# Patient Record
Sex: MALE | Race: WHITE | NOT HISPANIC OR LATINO | Employment: UNEMPLOYED | URBAN - METROPOLITAN AREA
[De-identification: names, ages, dates, MRNs, and addresses within clinical notes are randomized per-mention and may not be internally consistent; named-entity substitution may affect disease eponyms.]

---

## 2017-08-16 ENCOUNTER — APPOINTMENT (OUTPATIENT)
Dept: RADIOLOGY | Facility: MEDICAL CENTER | Age: 42
End: 2017-08-16
Attending: EMERGENCY MEDICINE
Payer: COMMERCIAL

## 2017-08-16 ENCOUNTER — HOSPITAL ENCOUNTER (EMERGENCY)
Facility: MEDICAL CENTER | Age: 42
End: 2017-08-16
Attending: EMERGENCY MEDICINE
Payer: COMMERCIAL

## 2017-08-16 VITALS
TEMPERATURE: 99.5 F | WEIGHT: 205.25 LBS | BODY MASS INDEX: 29.38 KG/M2 | RESPIRATION RATE: 19 BRPM | OXYGEN SATURATION: 98 % | HEIGHT: 70 IN | HEART RATE: 94 BPM | DIASTOLIC BLOOD PRESSURE: 94 MMHG | SYSTOLIC BLOOD PRESSURE: 147 MMHG

## 2017-08-16 DIAGNOSIS — S00.03XA CONTUSION OF SCALP, INITIAL ENCOUNTER: ICD-10-CM

## 2017-08-16 DIAGNOSIS — M25.511 ACUTE PAIN OF BOTH SHOULDERS: ICD-10-CM

## 2017-08-16 DIAGNOSIS — M25.512 ACUTE PAIN OF BOTH SHOULDERS: ICD-10-CM

## 2017-08-16 DIAGNOSIS — T07.XXXA ABRASIONS OF MULTIPLE SITES: ICD-10-CM

## 2017-08-16 DIAGNOSIS — S09.90XA CHI (CLOSED HEAD INJURY), INITIAL ENCOUNTER: ICD-10-CM

## 2017-08-16 DIAGNOSIS — S06.0X9A CONCUSSION, WITH LOC OF UNSPECIFIED DURATION, INITIAL ENCOUNTER: ICD-10-CM

## 2017-08-16 LAB
ABO GROUP BLD: NORMAL
ALBUMIN SERPL BCP-MCNC: 5.1 G/DL (ref 3.2–4.9)
ALBUMIN/GLOB SERPL: 1.5 G/DL
ALP SERPL-CCNC: 66 U/L (ref 30–99)
ALT SERPL-CCNC: 82 U/L (ref 2–50)
ANION GAP SERPL CALC-SCNC: 14 MMOL/L (ref 0–11.9)
APTT PPP: 24.1 SEC (ref 24.7–36)
AST SERPL-CCNC: 56 U/L (ref 12–45)
BILIRUB SERPL-MCNC: 1.5 MG/DL (ref 0.1–1.5)
BLD GP AB SCN SERPL QL: NORMAL
BUN SERPL-MCNC: 19 MG/DL (ref 8–22)
CALCIUM SERPL-MCNC: 10.3 MG/DL (ref 8.5–10.5)
CHLORIDE SERPL-SCNC: 107 MMOL/L (ref 96–112)
CO2 SERPL-SCNC: 19 MMOL/L (ref 20–33)
CREAT SERPL-MCNC: 0.82 MG/DL (ref 0.5–1.4)
ERYTHROCYTE [DISTWIDTH] IN BLOOD BY AUTOMATED COUNT: 42.1 FL (ref 35.9–50)
ETHANOL BLD-MCNC: 0 G/DL
GFR SERPL CREATININE-BSD FRML MDRD: >60 ML/MIN/1.73 M 2
GLOBULIN SER CALC-MCNC: 3.3 G/DL (ref 1.9–3.5)
GLUCOSE SERPL-MCNC: 97 MG/DL (ref 65–99)
HCT VFR BLD AUTO: 51.4 % (ref 42–52)
HGB BLD-MCNC: 18.4 G/DL (ref 14–18)
INR PPP: 1.11 (ref 0.87–1.13)
MCH RBC QN AUTO: 33.9 PG (ref 27–33)
MCHC RBC AUTO-ENTMCNC: 35.8 G/DL (ref 33.7–35.3)
MCV RBC AUTO: 94.7 FL (ref 81.4–97.8)
PLATELET # BLD AUTO: 236 K/UL (ref 164–446)
PMV BLD AUTO: 9.3 FL (ref 9–12.9)
POTASSIUM SERPL-SCNC: 3.6 MMOL/L (ref 3.6–5.5)
PROT SERPL-MCNC: 8.4 G/DL (ref 6–8.2)
PROTHROMBIN TIME: 14.7 SEC (ref 12–14.6)
RBC # BLD AUTO: 5.43 M/UL (ref 4.7–6.1)
RH BLD: NORMAL
SODIUM SERPL-SCNC: 140 MMOL/L (ref 135–145)
WBC # BLD AUTO: 10 K/UL (ref 4.8–10.8)

## 2017-08-16 PROCEDURE — 85610 PROTHROMBIN TIME: CPT

## 2017-08-16 PROCEDURE — 96375 TX/PRO/DX INJ NEW DRUG ADDON: CPT

## 2017-08-16 PROCEDURE — 96374 THER/PROPH/DIAG INJ IV PUSH: CPT

## 2017-08-16 PROCEDURE — 72128 CT CHEST SPINE W/O DYE: CPT

## 2017-08-16 PROCEDURE — 73030 X-RAY EXAM OF SHOULDER: CPT | Mod: RT

## 2017-08-16 PROCEDURE — 700111 HCHG RX REV CODE 636 W/ 250 OVERRIDE (IP): Performed by: EMERGENCY MEDICINE

## 2017-08-16 PROCEDURE — 700105 HCHG RX REV CODE 258: Performed by: EMERGENCY MEDICINE

## 2017-08-16 PROCEDURE — 86850 RBC ANTIBODY SCREEN: CPT

## 2017-08-16 PROCEDURE — 71260 CT THORAX DX C+: CPT

## 2017-08-16 PROCEDURE — 73030 X-RAY EXAM OF SHOULDER: CPT | Mod: LT

## 2017-08-16 PROCEDURE — 72131 CT LUMBAR SPINE W/O DYE: CPT

## 2017-08-16 PROCEDURE — 80307 DRUG TEST PRSMV CHEM ANLYZR: CPT

## 2017-08-16 PROCEDURE — 80053 COMPREHEN METABOLIC PANEL: CPT

## 2017-08-16 PROCEDURE — 85027 COMPLETE CBC AUTOMATED: CPT

## 2017-08-16 PROCEDURE — 85730 THROMBOPLASTIN TIME PARTIAL: CPT

## 2017-08-16 PROCEDURE — 307740 HCHG GREEN TRAUMA TEAM SERVICES

## 2017-08-16 PROCEDURE — 86901 BLOOD TYPING SEROLOGIC RH(D): CPT

## 2017-08-16 PROCEDURE — 72125 CT NECK SPINE W/O DYE: CPT

## 2017-08-16 PROCEDURE — 700117 HCHG RX CONTRAST REV CODE 255: Performed by: EMERGENCY MEDICINE

## 2017-08-16 PROCEDURE — 86900 BLOOD TYPING SEROLOGIC ABO: CPT

## 2017-08-16 PROCEDURE — 99284 EMERGENCY DEPT VISIT MOD MDM: CPT

## 2017-08-16 PROCEDURE — 70450 CT HEAD/BRAIN W/O DYE: CPT

## 2017-08-16 RX ORDER — ONDANSETRON 2 MG/ML
INJECTION INTRAMUSCULAR; INTRAVENOUS
Status: COMPLETED | OUTPATIENT
Start: 2017-08-16 | End: 2017-08-16

## 2017-08-16 RX ORDER — MORPHINE SULFATE 4 MG/ML
INJECTION, SOLUTION INTRAMUSCULAR; INTRAVENOUS
Status: COMPLETED | OUTPATIENT
Start: 2017-08-16 | End: 2017-08-16

## 2017-08-16 RX ORDER — SODIUM CHLORIDE 9 MG/ML
1000 INJECTION, SOLUTION INTRAVENOUS ONCE
Status: COMPLETED | OUTPATIENT
Start: 2017-08-16 | End: 2017-08-16

## 2017-08-16 RX ADMIN — IOHEXOL 100 ML: 350 INJECTION, SOLUTION INTRAVENOUS at 18:04

## 2017-08-16 RX ADMIN — MORPHINE SULFATE 4 MG: 4 INJECTION INTRAVENOUS at 17:47

## 2017-08-16 RX ADMIN — ONDANSETRON 4 MG: 2 INJECTION INTRAMUSCULAR; INTRAVENOUS at 17:48

## 2017-08-16 RX ADMIN — SODIUM CHLORIDE 1000 ML: 9 INJECTION, SOLUTION INTRAVENOUS at 19:13

## 2017-08-16 RX ADMIN — SODIUM CHLORIDE 1000 ML: 9 INJECTION, SOLUTION INTRAVENOUS at 20:58

## 2017-08-16 RX ADMIN — FENTANYL CITRATE 25 MCG: 50 INJECTION, SOLUTION INTRAMUSCULAR; INTRAVENOUS at 19:13

## 2017-08-16 NOTE — ED NOTES
Pt stated he was hiking last night, woke up this morning and crawled out of a ditch. Right arm pain, and loss of consciousness. Unsure of events leading up to LOC. Ambulatory, with no obvious injuries.

## 2017-08-16 NOTE — ED AVS SNAPSHOT
Hands Access Code: [email or web address]  Expires: 9/15/2017  9:32 PM    Hands  A secure, online tool to manage your health information     "[employer]’s Hands® is a secure, online tool that connects you to your personalized health information from the privacy of your home -- day or night - making it very easy for you to manage your healthcare. Once the activation process is completed, you can even access your medical information using the Hands chucho, which is available for free in the [employer] store or Google Play store.     Hands provides the following levels of access (as shown below):   My Chart Features   [facility] Primary Care Doctor [facility]  Specialists [facility]  Urgent  Care Non-[facility]  Primary Care  Doctor   Email your healthcare team securely and privately 24/7 X X X X   Manage appointments: schedule your next appointment; view details of past/upcoming appointments X      Request prescription refills. X      View recent personal medical records, including lab and immunizations X X X X   View health record, including health history, allergies, medications X X X X   Read reports about your outpatient visits, procedures, consult and ER notes X X X X   See your discharge summary, which is a recap of your hospital and/or ER visit that includes your diagnosis, lab results, and care plan. X X       How to register for Hands:  1. Go to  [email or web address].  2. Click on the Sign Up Now box, which takes you to the New Member Sign Up page. You will need to provide the following information:  a. Enter your Hands Access Code exactly as it appears at the top of this page. (You will not need to use this code after you’ve completed the sign-up process. If you do not sign up before the expiration date, you must request a new code.)   b. Enter your date of birth.   c. Enter your home email address.   d. Click Submit, and follow the next screen’s instructions.  3. Create a Hands ID. This will be your Hands  login ID and cannot be changed, so think of one that is secure and easy to remember.  4. Create a [employer] password. You can change your password at any time.  5. Enter your Password Reset Question and Answer. This can be used at a later time if you forget your password.   6. Enter your e-mail address. This allows you to receive e-mail notifications when new information is available in [employer].  7. Click Sign Up. You can now view your health information.    For assistance activating your [employer] account, call [phone]

## 2017-08-16 NOTE — ED AVS SNAPSHOT
Home Care Instructions                                                                                                                Jarod Fields   MRN: [id number]    Department:  [facility], Emergency Dept   Date of Visit:  8/16/2017            [facility], Emergency Dept    [address]    [place] [address]    Phone:  [phone]      You were seen by     Len Johnson M.D.      Your Diagnosis Was     [facility] (closed head injury), initial encounter     S09.90XA       These are the medications you received during your hospitalization from 08/16/2017 1641 to 08/16/2017 2132     Date/Time Order Dose Route Action    08/16/2017 1747 morphine (pf) 4 mg/ml injection 4 mg Intravenous Given    08/16/2017 1748 ondansetron (ZOFRAN) syringe/vial injection 4 mg Intravenous Given    08/16/2017 1804 iohexol (OMNIPAQUE) 350 mg/mL 100 mL Intravenous Given    08/16/2017 1913 NS infusion 1,000 mL 1,000 mL Intravenous New Bag    08/16/2017 1913 fentaNYL (SUBLIMAZE) injection 25 mcg 25 mcg Intravenous Given    08/16/2017 2058 NS infusion 1,000 mL 1,000 mL Intravenous New Bag      Follow-up Information     1. Follow up with Pcp Pt States None. Schedule an appointment as soon as possible for a visit in 1 day.    Specialty:  Family Medicine        2. Follow up with pcp. Schedule an appointment as soon as possible for a visit in 1 day.    Contact information    See her primary care physician tomorrow      Medication Information     Review all of your home medications and newly ordered medications with your primary doctor and/or pharmacist as soon as possible. Follow medication instructions as directed by your doctor and/or pharmacist.     Please keep your complete medication list with you and share with your physician. Update the information when medications are discontinued, doses are changed, or new medications (including over-the-counter products) are added; and carry medication  information at all times in the event of emergency situations.               Medication List      ASK your doctor about these medications        Instructions    Morning Afternoon Evening Bedtime    gabapentin 300 MG Caps   Commonly known as:  NEURONTIN        Take 300 mg by mouth 2 Times a Day.   Dose:  300 mg                        oxyCODONE CR 20 MG T12a   Commonly known as:  OXYCONTIN        Take 20 mg by mouth every 12 hours.   Dose:  20 mg                        * oxycodone-acetaminophen  MG Tabs   Commonly known as:  PERCOCET-10        Take 1 Tab by mouth every 6 hours as needed for Severe Pain.   Dose:  1 Tab                        * oxycodone-acetaminophen  MG Tabs   Commonly known as:  PERCOCET-10        Take 1-2 Tabs by mouth every four hours as needed for Severe Pain.   Dose:  1-2 Tab                        * LYRICA 300 MG capsule   Generic drug:  pregabalin        Take 300 mg by mouth 2 times a day.   Dose:  300 mg                        * pregabalin 150 MG Caps   Commonly known as:  LYRICA        Take 300 mg by mouth 2 times a day.   Dose:  300 mg                        tizanidine 2 MG tablet   Commonly known as:  ZANAFLEX        Take 2 mg by mouth 3 times a day as needed. Indications: Muscle Spasticity   Dose:  2 mg                        * Notice:  This list has 4 medication(s) that are the same as other medications prescribed for you. Read the directions carefully, and ask your doctor or other care provider to review them with you.            Procedures and tests performed during your visit     APTT    CBC WITHOUT DIFFERENTIAL    COD (ADULT)    COMP METABOLIC PANEL    COMPONENT CELLULAR    CT-CHEST,ABDOMEN,PELVIS WITH    CT-CSPINE WITHOUT PLUS RECONS    CT-HEAD W/O    CT-LSPINE W/O PLUS RECONS    CT-TSPINE W/O PLUS RECONS    DIAGNOSTIC ALCOHOL    DX-SHOULDER 2+ LEFT    DX-SHOULDER 2+ RIGHT    ESTIMATED GFR    PROTHROMBIN TIME        Discharge Instructions       Blunt Trauma  You have been  "evaluated for injuries. You have been examined and your caregiver has not found injuries serious enough to require hospitalization.  It is common to have multiple bruises and sore muscles following an accident. These tend to feel worse for the first 24 hours. You will feel more stiffness and soreness over the next several hours and worse when you wake up the first morning after your accident. After this point, you should begin to improve with each passing day. The amount of improvement depends on the amount of damage done in the accident.  Following your accident, if some part of your body does not work as it should, or if the pain in any area continues to increase, you should return to the Emergency Department for re-evaluation.   HOME CARE INSTRUCTIONS   Routine care for sore areas should include:  · Ice to sore areas every 2 hours for 20 minutes while awake for the next 2 days.  · Drink extra fluids (not alcohol).  · Take a hot or warm shower or bath once or twice a day to increase blood flow to sore muscles. This will help you \"limber up\".  · Activity as tolerated. Lifting may aggravate neck or back pain.  · Only take over-the-counter or prescription medicines for pain, discomfort, or fever as directed by your caregiver. Do not use aspirin. This may increase bruising or increase bleeding if there are small areas where this is happening.  SEEK IMMEDIATE MEDICAL CARE IF:  · Numbness, tingling, weakness, or problem with the use of your arms or legs.  · A severe headache is not relieved with medications.  · There is a change in bowel or bladder control.  · Increasing pain in any areas of the body.  · Short of breath or dizzy.  · Nauseated, vomiting, or sweating.  · Increasing belly (abdominal) discomfort.  · Blood in urine, stool, or vomiting blood.  · Pain in either shoulder in an area where a shoulder strap would be.  · Feelings of lightheadedness or if you have a fainting episode.  Sometimes it is not possible to " identify all injuries immediately after the trauma. It is important that you continue to monitor your condition after the emergency department visit. If you feel you are not improving, or improving more slowly than should be expected, call your physician. If you feel your symptoms (problems) are worsening, return to the Emergency Department immediately.  Document Released: 09/13/2002 Document Revised: 03/11/2013 Document Reviewed: 08/05/2009  ExitCare® Patient Information ©2014 [employer].    Acute Brain Injury, Guide for Family  This is basic information about brain injury and its treatment. Please read it at your own pace. As you learn more about the brain, you will have many questions. Members of the health care team will do their best to answer your questions. Definite answers may not be known because the long term effects of brain injury can be difficult to predict.  HOW DOES THE BRAIN WORK?  The brain controls the actions of the body and allows us to think, learn, and remember. The brain has three main parts which are the:  · Cerebral hemispheres, left and right which are divided into lobes.   · Cerebellum.   · Brain stem.   Each section of the brain has special jobs to do and sections of the brain also work together. The left cerebral hemisphere controls the right side of the body and is usually responsible for speech. The right cerebral hemisphere controls the left side of the body and is usually responsible for creative thinking.   PROTECTION AND OXYGEN FOR THE BRAIN  The brain controls many important functions. It needs good protection and blood supply. This is done in the following ways:  · Skull: A hard bone that surrounds the brain tissue.   · Dura: A tough covering around the brain tissue and the spinal cord.   · Cerebrospinal Fluid (CSF) or Spinal Fluid: Fluid that flows through the ventricles and around the brain and spinal cord. The ventricles are spaces inside the brain. The cerebrospinal fluid  "acts like a \"shock absorber\" for the brain.   · Blood: Provides oxygen and food for the brain.   WHAT TYPES OF BRAIN INJURIES MAY OCCUR?  Even though the brain is well protected, it may be injured. Every injury is different. Most injuries are a result of bruising, bleeding, twisting, or tearing of brain tissue. Damage to the brain may occur at the time of injury. It may also develop after the injury due to swelling or further bleeding. Some types of brain injury are:  · Skull Fracture: a break in the bone that surrounds the brain. These fractures often heal on their own. Surgery may be needed if there has been damage to the brain tissue below the fracture.   · Contusion/Concussion: a mild injury or bruise to the brain which may cause a short loss of consciousness. It may cause headaches, nausea, vomiting, dizziness, and problems with memory and concentration. This injury will not need surgery.   · Coup/Contre-Coup: A English word that describes contusions that occur at two sites in the brain. When the head is hit, the impact causes the brain to bump the opposite side of the skull. Damage occurs at the site of impact and on the opposite side of the brain.   · Epidural Hematoma: a blood clot that forms between the skull and the top lining of the brain (dura). This blood clot can cause fast changes in the pressure inside the brain. Emergency surgery may be needed. The size of the clot will determine if surgery is needed.   · Subdural Hematoma: a blood clot that forms between the dura and the brain tissue. If this bleeding occurs quickly it is called an acute subdural hematoma. If it occurs slowly over several weeks, it is called a chronic subdural hematoma. The clot may cause increased pressure and may need to be removed surgically.   · Intracerebral Hemorrhage: A blood clot deep in the middle of the brain that is hard to remove. Pressure from this clot may cause damage to the brain. Surgery may be needed to relieve the " pressure.   · Diffuse Axonal Injury (FAUSTO): Damage to the pathways (axons) that connect the different areas of the brain. This occurs when there is twisting and turning of the brain tissue at the time of injury. The brain messages are slowed or lost. Treatment is aimed at managing swelling in the brain because torn axons can not be repaired.   · Anoxic Brain Injury: An injury that results from a lack of oxygen to the brain. This is most often from a lack of blood flow due to injury or bleeding.   WHAT HAPPENS WHEN THE BRAIN IS INJURED?  Damage to the brain may occur immediately, as a result of the injury, or it may develop as a result of swelling or bleeding that follows the injury.  BRAIN WITH EDEMA:  Changes can result from brain injury. The brain tissue may swell causing it to take up more room in the skull. This is called edema. When this occurs, the swollen brain tissue will push the other contents of the skull to the side.  BRAIN WITH A HEMATOMA:  There may be bruising called contusions or a collection of blood called a hematoma or clot. This may also push the other contents to one side.  BRAIN WITH HYDROCEPHALUS:  The flow of CSF may also become blocked. This will cause the open spaces (ventricles) to become enlarged. This is called hydrocephalus.  Any of these changes can cause increased intracranial pressure.  WHAT IS INTRACRANIAL PRESSURE?  To understand intracranial pressure, think of the skull as a rigid box. After brain injury, the skull may become overfilled with swollen brain tissue, blood, or CSF. The skull will not stretch like skin to deal with swelling. The skull may become too full and increase the pressure on the brain tissue. This is called increased intracranial pressure.   WHAT IS CEREBRAL PERFUSION PRESSURE?  Blood flow to the brain is called cerebral perfusion pressure. Blood pressure and intracranial pressure affect the cerebral perfusion pressure. If the blood pressure is low and/or the  intracranial pressure is high, the blood flow to the brain may be limited. This causes decreased cerebral perfusion pressure.   HOW WILL THE PATIENT RESPOND TO A BRAIN INJURY?  The patient's responses may vary depending on the type of injury or pressure changes in the brain. Possible responses include:   · Agitation.   · Confusion.   · Patients may also have problems with speech, vision, or muscle weakness in their arms or legs.   · Decreased responses.   · Coma. A state of unresponsiveness when patients do not speak or follow commands, and are unaware of their surroundings. The length of time a patient remains in a coma varies.   HOW ARE BRAIN INJURIES EVALUATED?  Patients with brain injury require frequent assessments and diagnostic tests. These include:   · Neurological Exam: A series of questions and simple commands to see if the patient can open their eyes, move, speak, and understand what is going on around them. For example: What is your name? Where are you? What day is it? Wiggle your toes. Hold up two fingers. A standard way to describe patient responses may be used. Most hospitals use the [place] Coma Scale or [place] Levels of Cognitive Functioning. You can read about these scales and what the scores mean in the Appendix.   · X-ray: A picture that looks at bones to see if they are broken (fractured). It can also be used to take a picture of the chest to look at the lungs. This test may be done at the bedside or in the X-ray department and takes between 5-30 minutes to complete.   · CT Scan (CAT Scan): An X-ray that takes pictures of the brain or other parts of the body. The scan is painless but the patient must lie very still. The test takes 15-30 minutes to complete.   · MRI (Magnetic Resonance Imaging) Scan: A large magnet and radio waves are used, instead of X-rays, to take pictures of the body's tissues. It is painless but noisy. The machine is shaped like a long tube. The patient must lie still on a  flat table in the middle of the machine. The test takes about 60 minutes to complete.   · Angiogram: A test to look at the blood vessels in the brain. Using a catheter, or small flexible tube, dye is put into an artery (usually in the groin) that supplies blood to the brain. This test can tell if the blood vessels have been damaged or are spasming. The test takes 1-3 hours.   · ICP Monitor: A small tube placed into or just on top of the brain through a small hole in the skull. This will measure the pressure inside the brain (intracranial pressure).   · EEG (Electroencephalograph): A test to measure electrical activity in the brain. Special patches called electrodes are applied to the head to measure the activity. The test is painless and can be done at the bedside or in the EEG department. The length of the test varies.   HOW ARE BRAIN INJURIES TREATED?  The goals of brain injury treatment are to:   · Stop any bleeding.   · Prevent an increase in pressure within the skull.   · Control the amount of pressure, when it does increase.   · Maintain adequate blood flow to the brain.   · Remove any large blood clots.   TREATMENT   Treatments will vary with the type of injury. Your caregiver will decide which ones are used. Some common treatments are:  · Positioning: Usually the head of the bed will be elevated slightly and the neck kept straight. This position may decrease the intracranial pressure by allowing blood and CSF to drain from the brain. Please do not change the position of the bed without asking the nurse.   · Fluid Restriction: It may be necessary to limit the fluids that a patient receives. The brain is like a sponge. It swells with extra fluid. Limiting fluids can help control the swelling. Please do not give fluids without asking the nurse.   · Ventricular Drain (Ventriculostomy): A small tube is placed in the ventricle. It measures and controls pressure inside the skull. It can be used to drain some CSF  "(cerebrospinal fluid) from the brain.   · Ventilator: A machine used to support the patient in their own breathing, or give the patient breaths. When the ventilator gives extra breaths, the blood vessels in the brain become smaller. This may help control the intracranial pressure.   · Surgery: There are three types of surgery used with brain injury:   · Craniotomy - The skull is opened to relieve the causes of increased pressure inside the skull. Causes may be fractured bones, blood clots, or swollen brain tissue.   · Pankaj holes - A small opening is made into the skull to remove blood clots.   · Bone flap removal - A piece of bone is removed from the skull to relieve pressure caused by swollen brain tissue.   MEDICATION  There are several types of medications used to treat brain injury. Some of these include:  · Diuretics are used to decrease the amount of water in the patient's body. This makes less water available to the brain for swelling.   · Anticonvulsants are used to prevent seizures. Seizures occur as a result of extra electrical activity in the brain. There are several types of seizures. The most common type causes the patient to have jerking movements of the arms and legs followed by sleep. Other types may cause slight tremors of the face, or staring spells. Please notify the nurse or doctor if you see any of these signs. Some patients have a seizure at the time of injury while others may develop seizures after the injury.   · Sedatives are given if the patient's intracranial pressure is very high and hard to control. This medicine puts the patient into a deep \"sleep\". This may help prevent more swelling and damage.   WHAT EQUIPMENT WILL YOU SEE WHEN YOU VISIT?  Depending on the type of brain injury, different kinds of equipment will be used. Ask a member of the health care team if you have any questions about equipment.   · Monitor: A machine that shows heart rate, breathing, blood pressure, intracranial " "pressure, and cerebral perfusion pressure.   · Head Dressing: A bandage around the head used to keep the wound or incision clean and dry.   · ICP Monitor: A small tube placed into or just on top of the brain through a small hole in the skull. This will measure the amount of pressure inside the brain (intracranial pressure).   · Nasogastric Tube (NG): A tube placed through the nose into the stomach that can be used to suction the stomach or provide liquid formula directly into the stomach.   · Endotracheal Tube: A tube inserted through the patient's nose or mouth into the trachea (windpipe) to help with breathing and suctioning.   · EKG Lead Wires: Wires connected to the chest with small patches that measure the heart rate and rhythm.   · Intravenous Catheter (IV) and Intravenous Fluid: A flexible catheter which allows fluid, nutrients, and medicine to be given directly into a vein.   · Ventilator: A machine used in the Intensive Care Unit to support the patient in their own breathing or give the patient breaths.   · Anti-Embolism Stockings (Frequently call TEDS): Long white stockings used to help prevent the pooling of blood in the legs.   · Sequential Compression Stockings (Frequently called Ulices's): Plastic leg wraps that help prevent blood clots by inflating and deflating around the legs.   · Urinary \"Rose\" Catheter: A tube inserted into the bladder to drain and allow for accurate measurement of urine.   WHAT OTHER TREATMENTS MAY BE USED?  · Antibiotics: Antibiotics are used to prevent and treat infections that occur. It is not unusual for people with brain injuries to get infections. They may get pneumonia, bladder infections, blood infections, or infections in the brain or cerebrospinal fluid called meningitis.   · Chest PT and Suctioning: To prevent or treat pneumonia, staff may use a vibrating machine or may clap on the patient's chest. This loosens the phlegm in the lungs. Then the patient will be asked " to cough. If the patient is not able to cough up the phlegm they must be suctioned. When a patient is suctioned a catheter is placed in the back of the throat or into the lungs.   · Tracheostomy (Trach): If the patient has a lot of lung secretions or is on a ventilator for a long time they may need a trach. A trach is a tube placed in the trachea (windpipe). It will make it easier for the patient to cough up phlegm. It also allows the nurse to suction the lungs. Initially the patient will be unable to talk while the trach is in place. As the patient improves, a talking trach may be used. A trach is usually not permanent.   · Suctioning of the Stomach: Sometimes after brain injury, the stomach will stop working for a short time. This is called an ileus. Even though the stomach may not be working it continues to make acid. The acid may damage the stomach lining and cause stomach ulcers if they are not removed. A nasogastric tube (NG) will be placed through the nose into the stomach. This tube will be used to help remove stomach secretions. Medications may also be given to help prevent stomach ulcers.   · Nutrition: Meeting nutrition and fluid needs is important after brain injury. Patients may be less active, yet have very high nutritional needs. At first, nutrition may be supplied by an IV. When the stomach starts working, an evaluation of chewing and swallowing safety will be completed. If the patient is too sleepy to eat, or unable to swallow, a small nasogastric feeding tube may be used for nutrition. The tube is placed through the nose into the stomach. Liquid formula will be given through the feeding tube. Feedings may be given continuously or several times a day. The dietician will assist with food and fluid selection. Milkshakes and liquid formulas may also be used to provide extra calories and high protein nutrition. A feeding tube may be used if the patient continues to be too sleepy to eat or unable to  swallow. A gastrostomy tube is a feeding tube that goes in the stomach. A jejunostomy tube is a feeding tube that goes in the intestine.   · Bowel and Bladder Care: Patients may not have control of their bowel or bladder. Catheters or diapers will be used until bowel and bladder control returns.   · Skin Care: Some things that help prevent bedsores include turning the patient, padding equipment, keeping skin clean and dry, using special mattresses, and making sure the patient gets enough calories.   · Range of Motion (ROM) and Splints: Patients with brain injury may not be able to move their joints as much as needed. This can cause tight muscles and joints called contractures. Range of motion (ROM) exercises and special splints for hands and feet help prevent contractures.   · Pain Control: Comfort measures and medication will be used for pain control. However, medications may be limited to types that do not cause drowsiness.   WHO WILL HELP AFTER BRAIN INJURY?  Members of the health care team will work together with the patient, family, and friends during the hospital stay. Care will be centered on the individual needs of the patient. Family and friends are important members of the team.   · Patient: The patient is the most important member of the team. Care will be planned based on how the patient responds to treatment.   · Family and Friends: You provide emotional support to the patient. Family and friends also provide the health care team with important facts about the patient's past history and can help watch for changes. Other team members will show you what you can do to help with the recovery process.   · Doctors: Neurosurgery doctors are specialists who help determine the type of brain injury and its treatment. They may perform surgery on the brain. They will work with other doctors if the patient is in intensive care or has injuries to other parts of the body.   · Nurses: Nurses check patient's vitals  (temperature, blood pressure, heart and breathing rate) and watch for changes in strength and thinking. They help with daily cares such as eating and bathing. Nurses also coordinate care among the members of the health care team.   · Social Workers: Social workers provide emotional support to help the patient and family adjust to being in the hospital. They coordinate discharge planning, referral to community resources, and answer questions about insurance or disability.   · Physical Therapists (PT): Physical therapists evaluate and treat weaknesses in the patient's strength, flexibility, balance, rolling, sitting, standing and walking. Treatment may include exercises or instruction in use of equipment such as walkers, canes, or wheelchairs.   · Occupational Therapists (OT): Occupational therapists evaluate the patient's ability to perform dressing, bathing, homemaking and activities that require memory and organization. They provide treatment or equipment needed for safe independent living.   · Speech Therapists: Speech therapists test and treat speech, language, thinking and swallowing problems.   · Neuropsychologists: Neuropsychologists test thinking, memory, judgment, emotions, behavior and personality. This information can be used to help guide treatment. It will also help determine the amount of supervision that the patient needs when they leave the hospital.   · Dieticians: Dieticians assess nutritional needs. They work with the patient and other team members to help the patient meet their nutritional goals.   · Other staff members may work with the patient and family. These include:   · Respiratory therapists.   · Activity Therapists.   · Clergy.   · Child Life Therapists.   · Patient Representatives.   · Vocational Counselors.   · Music Therapists.   · Recreation therapists.   HOW WILL YOU REACT?  When a friend or family member is hospitalized with a brain injury, it is normal for you to have many different  emotions. These emotions will come and go at different times.   · Panic and Fear: Panic and fear are common reactions after a family member has a brain injury. Fears are intense because you are worried the patient may not survive. Until the patient becomes medically stable, your physical and emotional signs of panic may continue. These symptoms include rapid breathing, inability to sleep, decreased appetite and upset stomach. Some people may cry uncontrollably.   · Shock and Denial: You may feel that what is happening is not real. You may notice things going on around you, but have trouble remembering information and conversations or meetings with others. You may also have a hard time understanding the seriousness of the injury that has occurred.   · Anger: Many people feel angry that they or their loved ones are in this situation. This may be justified. You may be angry with the patient for putting themselves in a situation where they could be hurt. You may also be angry with family members, friends, or others involved in the accident. You may be upset with the health care team for not doing or saying what you think is right. These are all normal reactions.   · Guilt: Guilt is also a normal reaction. You may feel you could have done something to prevent the accident from happening, even when this may be far from true. You may also think about past events and personal experiences with the patient that you wish could have been different or better. If you are feeling angry with the patient, you may also feel guilty about your anger. We encourage you to talk about your feelings with someone close to you or a professional staff member.   · Isolation: During this time you may feel distant from others. In this strange situation, you may have a hard time relating to others. You may think that others will not understand. You may isolate yourself because you think others are scared or disapprove of your feelings. However, a  "crisis such as a brain injury is a time when it is helpful to accept comfort, support and assistance from others.   · Hope: As the patient begins to stabilize, your anxiety about survival will be combined with hope of recovery. Medical complications and slow recovery may increase anxiety. However, hope may be brought about by the smallest changes.   Any of these emotions are normal reactions to a very stressful situation. You may find it helpful to discuss your feelings with friends, family, or the health care team. It may also be useful to write about your feelings and experiences in a daily journal.   WHAT ARE SUGGESTIONS FOR COPING?  People cope with stressful situations in different ways. What works for one person may not be helpful to another. We hope some of these suggestions will help you get through this difficult time.   · Write important information down in a journal or notebook. You can use this to keep track of questions you want to ask members of the health care team. It may also be useful to share with the patient.   · Establish a \"phone tree.\" Name one person for family and friends to call for information on the patient's condition.   · Rotate family visits. If you need or want to leave the hospital, you may want to have someone stay with the patient so you can feel reassured the patient is not alone.   · When someone offers to help, accept the offer. Try to be specific about how this person can help.   · Express your feelings. Discuss your positive and negative feelings with family members, friends, and staff.   · Be kind to yourself. Take time for a walk or have a meal with a friend. Also, try to leave the hospital for a meal or restful night of sleep. It is very important to take care of yourself. By taking care of your own needs, you will be more prepared to make good decisions and support your loved one.   WHAT ARE THE SIGNS OF IMPROVEMENT?  \"How long will it take for my loved one to get better?\" " "\"What will he or she be like?\" Your health care team may have a hard time answering these questions. Age, extent of damage, length of time since injury, and past mental and physical health of the patient are factors used when predicting the extent of recovery.   Most patients follow a general pattern of recovery after a severe brain injury. This pattern is divided into stages. It is important to know each patient is different and may not follow the stages exactly. Patients vary in the amount of time spent at each stage and their recovery may stop at any stage. Recovery may be grouped into the following four stages:  Stage 1: Unresponsiveness: During this stage the patient does not respond consistently or appropriately. You may hear this stage referred to as a coma. You may notice different movements in the patient. These are referred to as reflexive or generalized responses.  · Decerebrate: A reflex that causes straightening of the arms and legs.   · Decorticate: A reflex that causes bending of the arms and straightening of the legs.   · Generalized responses: Random movement of the arms and legs for no specific reason.   Stage 2: Early Responses: During this stage the patient starts to respond to things that are happening to them. The responses will be more appropriate, but may be inconsistent or slow. The patient will start to have localized responses and follow simple commands. Some examples of early responses to watch for are:  Localized response: These are appropriate movements by the patient in response to sound, touch, or sight. Turning toward a sound, pulling away from something uncomfortable, or following movement with the eyes are examples.   Following simple commands: Opening and closing eyes, sticking the tongue out, or gripping and releasing hands when asked are examples.  Stage 3. Agitated and Confused: At this stage the patient is responding more consistently. The patient will be confused about where " he or she is and what has happened. The patient will have difficulty with memory and behavior. The patient's confusion may lead to yelling, swearing, biting, or striking out. Do not be alarmed if soft wrist and ankle ties are used to protect the patient and prevent tubes from being pulled out. It is very important to remember this stage is a step toward recovery and this behavior is not intentional.  Stage 4. Higher Level Responses: The patient completes routine tasks without difficulty, but still needs help with problem solving, making judgments and decisions. The patient may not understand his or her limitations and safety is a big concern. Unusual or high stress situations make activities more difficult. The patient may seem more like the person you knew before. However, there may be personality changes.  Unfortunately, there is no way to predict how long a person will remain in one stage or what the final outcome will be. The team will work during the hospital stay to achieve the best possible outcome.   HOW CAN YOU HELP WITH RECOVERY?  The family and friends of a person with a brain injury are important members of the team. Your knowledge about the patient's emotional and physical needs is valuable, and so is your participation in helping take care of these needs.  The following are suggestions for things you can do that correspond with the stages of recovery.  Stage 1. Unresponsive Stage  · At this stage the patient appears to be in a deep sleep and does not respond to their surroundings. The goal is to obtain a response to touch, sound, sight or smell.   · When speaking to the patient, assume he or she understands what you are saying. Speak in a comforting, positive and familiar way.   · Speak clearly and slowly about familiar people and memories.   · When visitors are present, focus on the patient. Keep the number of visitors to 1 or 2 people at a time. Visits should be short. Other distractions (TV, radio)  "should be turned off when visiting.   · Provide the patient with pictures, music, and personal items that are comforting and familiar. Use poster board or a bulletin board near the bed.   · The nurses and therapists may encourage you to assist in care of the patient. You may be asked to help with hair care, shaving, applying skin lotion or gently stretching and positioning the patient's arms and legs. If you do not feel comfortable with these activities, that is okay. The staff will understand.   Stage 2. Early Responses  · At this stage the patient is beginning to respond to people and hospital surroundings. The responses may range from turning toward a familiar voice to moving an arm or leg on request.   · The goal is to increase the consistency of responses.   · There may be a delayed response time when asking the patient to move, speak, or pay attention. Always wait 1-2 minutes for the requested response. Repeat your request only a couple of times during this time period.   · Be aware that the patient's attention span may only be 5-10 minutes before fatigue and frustration set in.   · Allow for rest periods. Turn off the TV, music, and lights, and limit visitors. The patient can become stressed by too much noise, light or stimulation.   · Continue with suggestions listed in the \"unresponsive\" stage.   Stage 3. Agitated and Confused Responses  · During this stage, things are confusing. The patient may begin to remember past events but may be unsure of surroundings and the reason for hospitalization. The goal is to help the patient become oriented and to continue to treat his or her physical needs. Provide one activity at a time and expect the patient to pay attention for only short periods. Keeping the noise level low helps the patient focus.   · The patient may repeat a word, phrase, or activity over and over. Try to interest the patient in a different activity.   · The patient may do socially unacceptable " things during this time, such as swearing or hitting. This is common. Calmly tell the patient the behavior is not appropriate.   · Help orient the patient to his or her surroundings with both visual and verbal information (such as the suggestions below). Remembering information from one time to another is difficult.   · A calendar with the days marked off.   · A sign in the room telling them where they are.   · A posted schedule with meal times, therapies, and special appointments.   · To decrease frustration, allow the patient to move about with supervision.   Stage 4. Higher Level Responses  · At this stage the patient is able to take part in daily routines with help for problem solving, making judgments, and decisions. Most of the suggestions from the previous stage continue to apply here. The goal is to decrease the amount of supervision needed and increase independence.   · Help make the environment safe. Safety decisions may still be difficult for the patient to make. Praise safe decisions and give calm explanations about unsafe decisions. Learning is still difficult.   · Encourage the use of memory aids, such as a date book, to help with appointments and daily routines.   · Encourage brief rest periods because the patient will continue to need more rest.   · Check with the health care team on activities that may be completed with or without supervision. These activities may include work or school re-entry, taking medications, driving, or managing money.   WHAT HAPPENS AFTER ACUTE CARE?  · Discharge Planning: Early in the hospital stay, the  will meet with the patient and family to start discharge planning. Many patients will need care or therapy after they leave acute care. Some patients will be discharged to a nursing facility, while others will be discharged to their homes.   · Discharge to a Nursing Facility: There are several types of nursing facilities.   · Skilled care provides extensive  nursing care and daily therapy. Many patients will start with skilled care and then move to acute rehabilitation care, intermediate care, or residential care.   · Acute rehabilitation provides an inpatient program of intense therapy in a hospital. The patient will need to actively participate in 3 to 6 hours of therapy per day (i.e. physical, occupational, speech, and activity therapy).   · Intermediate care provides less extensive nursing care and therapy. Residential care is for patients who are fairly independent and do not need routine nursing care or therapies.   · Discharge to Home: If the patient goes home, they may still need therapy or other care. Some of the options are:   · Outpatient therapy is provided at hospitals, clinics and some nursing homes. Outpatient physical therapy will help build up strength and endurance. Outpatient occupational, speech and cognitive therapy may also be needed. Family or friends may need to arrange transportation for therapy appointments.   · Home health care programs are available in many communities. Some of the services they offer include in-home nursing care, homemaker and health aides, meals-on-wheels, adult day care, home therapy visits, medical equipment rental/purchase, and transportation.   MAKING THE CHOICE  During the acute care hospital stay, the health care team will make recommendations about the level of care the patient will need at discharge. Then the patient and family will choose the agency that provides the services. Your choice may be based on insurance coverage, location, the patient's potential for recovery, and the patient's or family's feelings about the services provided. Some facilities will come to the hospital to see the patient and their medical record before they decide to accept the patient. If accepted, the date for transfer or discharge will be set.   Document Released: 12/18/2006 Document Revised: 03/11/2013 Document Reviewed:  01/14/2008  [employer]® Patient Information ©2013 [employer]  Concussion, Adult  A concussion is a brain injury. It is caused by:  · A hit to the head.  · A quick and sudden movement (jolt) of the head or neck.  A concussion is usually not life threatening. Even so, it can cause serious problems. If you had a concussion before, you may have concussion-like problems after a hit to your head.  HOME CARE  General Instructions  · Follow your doctor's directions carefully.  · Take medicines only as told by your doctor.  · Only take medicines your doctor says are safe.  · Do not drink alcohol until your doctor says it is okay. Alcohol and some drugs can slow down healing. They can also put you at risk for further injury.  · If you are having trouble remembering things, write them down.  · Try to do one thing at a time if you get distracted easily. For example, do not watch TV while making dinner.  · Talk to your family members or close friends when making important decisions.  · Follow up with your doctor as told.  · Watch your symptoms. Tell others to do the same. Serious problems can sometimes happen after a concussion. Older adults are more likely to have these problems.  · Tell your teachers, school nurse, school counselor, , , or  about your concussion. Tell them about what you can or cannot do. They should watch to see if:  · It gets even harder for you to pay attention or concentrate.  · It gets even harder for you to remember things or learn new things.  · You need more time than normal to finish things.  · You become annoyed (irritable) more than before.  · You are not able to deal with stress as well.  · You have more problems than before.  · Rest. Make sure you:  · Get plenty of sleep at night.  · Go to sleep early.  · Go to bed at the same time every day. Try to wake up at the same time.  · Rest during the day.  · Take naps when you feel tired.  · Limit activities where you  have to think a lot or concentrate. These include:  · Doing homework.  · Doing work related to a job.  · Watching TV.  · Using the computer.  Returning To Your Regular Activities  Return to your normal activities slowly, not all at once. You must give your body and brain enough time to heal.   · Do not play sports or do other athletic activities until your doctor says it is okay.  · Ask your doctor when you can drive, ride a bicycle, or work other vehicles or machines. Never do these things if you feel dizzy.  · Ask your doctor about when you can return to work or school.  Preventing Another Concussion  It is very important to avoid another brain injury, especially before you have healed. In rare cases, another injury can lead to permanent brain damage, brain swelling, or death. The risk of this is greatest during the first 7-10 days after your injury. Avoid injuries by:   · Wearing a seat belt when riding in a car.  · Not drinking too much alcohol.  · Avoiding activities that could lead to a second concussion (such as contact sports).  · Wearing a helmet when doing activities like:  · Biking.  · Skiing.  · Skateboarding.  · Skating.  · Making your home safer by:  · Removing things from the floor or stairways that could make you trip.  · Using grab bars in bathrooms and handrails by stairs.  · Placing non-slip mats on floors and in bathtubs.  · Improve lighting in dark areas.  GET HELP IF:  · It gets even harder for you to pay attention or concentrate.  · It gets even harder for you to remember things or learn new things.  · You need more time than normal to finish things.  · You become annoyed (irritable) more than before.  · You are not able to deal with stress as well.  · You have more problems than before.  · You have problems keeping your balance.  · You are not able to react quickly when you should.  Get help if you have any of these problems for more than 2 weeks:   · Lasting (chronic) headaches.  · Dizziness  or trouble balancing.  · Feeling sick to your stomach (nausea).  · Seeing (vision) problems.  · Being affected by noises or light more than normal.  · Feeling sad, low, down in the dumps, blue, gloomy, or empty (depressed).  · Mood changes (mood swings).  · Feeling of fear or nervousness about what may happen (anxiety).  · Feeling annoyed.  · Memory problems.  · Problems concentrating or paying attention.  · Sleep problems.  · Feeling tired all the time.  GET HELP RIGHT AWAY IF:   · You have bad headaches or your headaches get worse.  · You have weakness (even if it is in one hand, leg, or part of the face).  · You have loss of feeling (numbness).  · You feel off balance.  · You keep throwing up (vomiting).  · You feel tired.  · One black center of your eye (pupil) is larger than the other.  · You twitch or shake violently (convulse).  · Your speech is not clear (slurred).  · You are more confused, easily angered (agitated), or annoyed than before.  · You have more trouble resting than before.  · You are unable to recognize people or places.  · You have neck pain.  · It is difficult to wake you up.  · You have unusual behavior changes.  · You pass out (lose consciousness).  MAKE SURE YOU:   · Understand these instructions.  · Will watch your condition.  · Will get help right away if you are not doing well or get worse.     This information is not intended to replace advice given to you by your health care provider. Make sure you discuss any questions you have with your health care provider.     Document Released: 12/06/2010 Document Revised: 01/08/2016 Document Reviewed: 07/10/2014  [employer] Patient Education ©2016 [employer]    Contusion  A contusion is a deep bruise. Contusions happen when an injury causes bleeding under the skin. Signs of bruising include pain, puffiness (swelling), and discolored skin. The contusion may turn blue, purple, or yellow.  HOME CARE   · Put ice on the injured  area.  · Put ice in a plastic bag.  · Place a towel between your skin and the bag.  · Leave the ice on for 15-20 minutes, 03-04 times a day.  · Only take medicine as told by your doctor.  · Rest the injured area.  · If possible, raise (elevate) the injured area to lessen puffiness.  GET HELP RIGHT AWAY IF:   · You have more bruising or puffiness.  · You have pain that is getting worse.  · Your puffiness or pain is not helped by medicine.  MAKE SURE YOU:   · Understand these instructions.  · Will watch your condition.  · Will get help right away if you are not doing well or get worse.     This information is not intended to replace advice given to you by your health care provider. Make sure you discuss any questions you have with your health care provider.     Document Released: 06/05/2009 Document Revised: 03/11/2013 Document Reviewed: 10/22/2012  [employer] Interactive Patient Education ©2016 [employer]    Head Injury, Adult  You have received a head injury. It does not appear serious at this time. Headaches and vomiting are common following head injury. It should be easy to awaken from sleeping. Sometimes it is necessary for you to stay in the emergency department for a while for observation. Sometimes admission to the hospital may be needed. After injuries such as yours, most problems occur within the first 24 hours, but side effects may occur up to 7-10 days after the injury. It is important for you to carefully monitor your condition and contact your health care provider or seek immediate medical care if there is a change in your condition.  WHAT ARE THE TYPES OF HEAD INJURIES?  Head injuries can be as minor as a bump. Some head injuries can be more severe. More severe head injuries include:  · A jarring injury to the brain (concussion).  · A bruise of the brain (contusion). This mean there is bleeding in the brain that can cause swelling.  · A cracked skull (skull fracture).  · Bleeding in the brain that  collects, clots, and forms a bump (hematoma).  WHAT CAUSES A HEAD INJURY?  A serious head injury is most likely to happen to someone who is in a car wreck and is not wearing a seat belt. Other causes of major head injuries include bicycle or motorcycle accidents, sports injuries, and falls.  HOW ARE HEAD INJURIES DIAGNOSED?  A complete history of the event leading to the injury and your current symptoms will be helpful in diagnosing head injuries. Many times, pictures of the brain, such as CT or MRI are needed to see the extent of the injury. Often, an overnight hospital stay is necessary for observation.   WHEN SHOULD I SEEK IMMEDIATE MEDICAL CARE?   You should get help right away if:  · You have confusion or drowsiness.  · You feel sick to your stomach (nauseous) or have continued, forceful vomiting.  · You have dizziness or unsteadiness that is getting worse.  · You have severe, continued headaches not relieved by medicine. Only take over-the-counter or prescription medicines for pain, fever, or discomfort as directed by your health care provider.  · You do not have normal function of the arms or legs or are unable to walk.  · You notice changes in the black spots in the center of the colored part of your eye (pupil).  · You have a clear or bloody fluid coming from your nose or ears.  · You have a loss of vision.  During the next 24 hours after the injury, you must stay with someone who can watch you for the warning signs. This person should contact local emergency services (911 in the [place] if you have seizures, you become unconscious, or you are unable to wake up.  HOW CAN I PREVENT A HEAD INJURY IN THE FUTURE?  The most important factor for preventing major head injuries is avoiding motor vehicle accidents.  To minimize the potential for damage to your head, it is crucial to wear seat belts while riding in motor vehicles. Wearing helmets while bike riding and playing collision sports (like football) is also  helpful. Also, avoiding dangerous activities around the house will further help reduce your risk of head injury.   WHEN CAN I RETURN TO NORMAL ACTIVITIES AND ATHLETICS?  You should be reevaluated by your health care provider before returning to these activities. If you have any of the following symptoms, you should not return to activities or contact sports until 1 week after the symptoms have stopped:  · Persistent headache.  · Dizziness or vertigo.  · Poor attention and concentration.  · Confusion.  · Memory problems.  · Nausea or vomiting.  · Fatigue or tire easily.  · Irritability.  · Intolerant of bright lights or loud noises.  · Anxiety or depression.  · Disturbed sleep.  MAKE SURE YOU:   · Understand these instructions.  · Will watch your condition.  · Will get help right away if you are not doing well or get worse.     This information is not intended to replace advice given to you by your health care provider. Make sure you discuss any questions you have with your health care provider.     Document Released: 12/18/2006 Document Revised: 01/08/2016 Document Reviewed: 08/25/2014  [employer] Patient Education ©2016 [employer]            Patient Information     Patient Information    Following emergency treatment: all patient requiring follow-up care must return either to a private physician or a clinic if your condition worsens before you are able to obtain further medical attention, please return to the emergency room.     Billing Information    At [facility], we work to make the billing process streamlined for our patients.  Our Representatives are here to answer any questions you may have regarding your hospital bill.  If you have insurance coverage and have supplied your insurance information to us, we will submit a claim to your insurer on your behalf.  Should you have any questions regarding your bill, we can be reached online or by phone as follows:  Online: You are able pay your bills  online or live chat with our representatives about any billing questions you may have. We are here to help Monday - Friday from 8:00am to 7:30pm and 9:00am - 12:00pm on Saturdays.  Please visit [email or web address]  for more information.   Phone:  [phone] or [phone]    Please note that your emergency physician, surgeon, pathologist, radiologist, anesthesiologist, and other specialists are not employed by [facility] and will therefore bill separately for their services.  Please contact them directly for any questions concerning their bills at the numbers below:     Emergency Physician Services:  [phone]  [facility]:  [phone]  [facility]:  [phone]  [facility]:  [phone]    1. Your final bill may vary from the amount quoted upon discharge if all procedures are not complete at that time, or if your doctor has additional procedures of which we are not aware. You will receive an additional bill if you return to the Emergency Department at [facility] for suture removal regardless of the facility of which the sutures were placed.     2. Please arrange for settlement of this account at the emergency registration.    3. All self-pay accounts are due in full at the time of treatment.  If you are unable to meet this obligation then payment is expected within 4-5 days.     4. If you have had radiology studies (CT, X-ray, Ultrasound, MRI), you have received a preliminary result during your emergency department visit. Please contact the radiology department [phone] to receive a copy of your final result. Please discuss the Final result with your primary physician or with the follow up physician provided.     Crisis Hotline:  National Crisis Hotline:  3-356-RYEKPBZ or 1-526.920.9770  [place] Crisis Hotline:    1-745.492.8121 or 927-215-6861         ED Discharge Follow Up Questions    1. In order to provide you  with very good care, we would like to follow up with a phone call in the next few days.  May we have your permission to contact you?     YES /  NO    2. What is the best phone number to call you? (       )_____-__________    3. What is the best time to call you?      Morning  /  Afternoon  /  Evening                   Patient Signature:  ____________________________________________________________    Date:  ____________________________________________________________

## 2017-08-16 NOTE — ED AVS SNAPSHOT
8/16/2017    Jarod Fields  [address] [facility] [address] [facility]  [place] [address]    Dear Jarod:    [facility] wants to ensure your discharge home is safe and you or your loved ones have had all of your questions answered regarding your care after you leave the hospital.    Below is a list of resources and contact information should you have any questions regarding your hospital stay, follow-up instructions, or active medical symptoms.    Questions or Concerns Regarding… Contact   Medical Questions Related to Your Discharge  (7 days a week, 8am-5pm) Contact a Nurse Care Coordinator   [phone]   Medical Questions Not Related to Your Discharge  (24 hours a day / 7 days a week)  Contact the Nurse Health Line   [phone]    Medications or Discharge Instructions Refer to your discharge packet   or contact your [facility] Primary Care Provider   [phone]   Follow-up Appointment(s) Schedule your appointment via [employer]   or contact Scheduling [phone]   Billing Review your statement via [employer]  or contact Billing [phone]   Medical Records Review your records via [employer]   or contact Medical Records [phone]     You may receive a telephone call within two days of discharge. This call is to make certain you understand your discharge instructions and have the opportunity to have any questions answered. You can also easily access your medical information, test results and upcoming appointments via the [employer] free online health management tool. You can learn more and sign up at [employer]. For assistance setting up your [employer] account, please call [phone].    Once again, we want to ensure your discharge home is safe and that you have a clear understanding of any next steps in your care. If you have any questions or concerns, please do not hesitate to contact us, we are here for you. Thank you for choosing [facility] for your healthcare needs.    Sincerely,    Your [facility] Healthcare Team

## 2017-08-17 NOTE — DISCHARGE INSTRUCTIONS
"Blunt Trauma  You have been evaluated for injuries. You have been examined and your caregiver has not found injuries serious enough to require hospitalization.  It is common to have multiple bruises and sore muscles following an accident. These tend to feel worse for the first 24 hours. You will feel more stiffness and soreness over the next several hours and worse when you wake up the first morning after your accident. After this point, you should begin to improve with each passing day. The amount of improvement depends on the amount of damage done in the accident.  Following your accident, if some part of your body does not work as it should, or if the pain in any area continues to increase, you should return to the Emergency Department for re-evaluation.   HOME CARE INSTRUCTIONS   Routine care for sore areas should include:  · Ice to sore areas every 2 hours for 20 minutes while awake for the next 2 days.  · Drink extra fluids (not alcohol).  · Take a hot or warm shower or bath once or twice a day to increase blood flow to sore muscles. This will help you \"limber up\".  · Activity as tolerated. Lifting may aggravate neck or back pain.  · Only take over-the-counter or prescription medicines for pain, discomfort, or fever as directed by your caregiver. Do not use aspirin. This may increase bruising or increase bleeding if there are small areas where this is happening.  SEEK IMMEDIATE MEDICAL CARE IF:  · Numbness, tingling, weakness, or problem with the use of your arms or legs.  · A severe headache is not relieved with medications.  · There is a change in bowel or bladder control.  · Increasing pain in any areas of the body.  · Short of breath or dizzy.  · Nauseated, vomiting, or sweating.  · Increasing belly (abdominal) discomfort.  · Blood in urine, stool, or vomiting blood.  · Pain in either shoulder in an area where a shoulder strap would be.  · Feelings of lightheadedness or if you have a fainting " episode.  Sometimes it is not possible to identify all injuries immediately after the trauma. It is important that you continue to monitor your condition after the emergency department visit. If you feel you are not improving, or improving more slowly than should be expected, call your physician. If you feel your symptoms (problems) are worsening, return to the Emergency Department immediately.  Document Released: 09/13/2002 Document Revised: 03/11/2013 Document Reviewed: 08/05/2009  ExitCare® Patient Information ©2014 [employer] [facility].    Acute Brain Injury, Guide for Family  This is basic information about brain injury and its treatment. Please read it at your own pace. As you learn more about the brain, you will have many questions. Members of the health care team will do their best to answer your questions. Definite answers may not be known because the long term effects of brain injury can be difficult to predict.  HOW DOES THE BRAIN WORK?  The brain controls the actions of the body and allows us to think, learn, and remember. The brain has three main parts which are the:  · Cerebral hemispheres, left and right which are divided into lobes.   · Cerebellum.   · Brain stem.   Each section of the brain has special jobs to do and sections of the brain also work together. The left cerebral hemisphere controls the right side of the body and is usually responsible for speech. The right cerebral hemisphere controls the left side of the body and is usually responsible for creative thinking.   PROTECTION AND OXYGEN FOR THE BRAIN  The brain controls many important functions. It needs good protection and blood supply. This is done in the following ways:  · Skull: A hard bone that surrounds the brain tissue.   · Dura: A tough covering around the brain tissue and the spinal cord.   · Cerebrospinal Fluid (CSF) or Spinal Fluid: Fluid that flows through the ventricles and around the brain and spinal cord. The ventricles are spaces  "inside the brain. The cerebrospinal fluid acts like a \"shock absorber\" for the brain.   · Blood: Provides oxygen and food for the brain.   WHAT TYPES OF BRAIN INJURIES MAY OCCUR?  Even though the brain is well protected, it may be injured. Every injury is different. Most injuries are a result of bruising, bleeding, twisting, or tearing of brain tissue. Damage to the brain may occur at the time of injury. It may also develop after the injury due to swelling or further bleeding. Some types of brain injury are:  · Skull Fracture: a break in the bone that surrounds the brain. These fractures often heal on their own. Surgery may be needed if there has been damage to the brain tissue below the fracture.   · Contusion/Concussion: a mild injury or bruise to the brain which may cause a short loss of consciousness. It may cause headaches, nausea, vomiting, dizziness, and problems with memory and concentration. This injury will not need surgery.   · Coup/Contre-Coup: A Korean word that describes contusions that occur at two sites in the brain. When the head is hit, the impact causes the brain to bump the opposite side of the skull. Damage occurs at the site of impact and on the opposite side of the brain.   · Epidural Hematoma: a blood clot that forms between the skull and the top lining of the brain (dura). This blood clot can cause fast changes in the pressure inside the brain. Emergency surgery may be needed. The size of the clot will determine if surgery is needed.   · Subdural Hematoma: a blood clot that forms between the dura and the brain tissue. If this bleeding occurs quickly it is called an acute subdural hematoma. If it occurs slowly over several weeks, it is called a chronic subdural hematoma. The clot may cause increased pressure and may need to be removed surgically.   · Intracerebral Hemorrhage: A blood clot deep in the middle of the brain that is hard to remove. Pressure from this clot may cause damage to the " brain. Surgery may be needed to relieve the pressure.   · Diffuse Axonal Injury (FAUSTO): Damage to the pathways (axons) that connect the different areas of the brain. This occurs when there is twisting and turning of the brain tissue at the time of injury. The brain messages are slowed or lost. Treatment is aimed at managing swelling in the brain because torn axons can not be repaired.   · Anoxic Brain Injury: An injury that results from a lack of oxygen to the brain. This is most often from a lack of blood flow due to injury or bleeding.   WHAT HAPPENS WHEN THE BRAIN IS INJURED?  Damage to the brain may occur immediately, as a result of the injury, or it may develop as a result of swelling or bleeding that follows the injury.  BRAIN WITH EDEMA:  Changes can result from brain injury. The brain tissue may swell causing it to take up more room in the skull. This is called edema. When this occurs, the swollen brain tissue will push the other contents of the skull to the side.  BRAIN WITH A HEMATOMA:  There may be bruising called contusions or a collection of blood called a hematoma or clot. This may also push the other contents to one side.  BRAIN WITH HYDROCEPHALUS:  The flow of CSF may also become blocked. This will cause the open spaces (ventricles) to become enlarged. This is called hydrocephalus.  Any of these changes can cause increased intracranial pressure.  WHAT IS INTRACRANIAL PRESSURE?  To understand intracranial pressure, think of the skull as a rigid box. After brain injury, the skull may become overfilled with swollen brain tissue, blood, or CSF. The skull will not stretch like skin to deal with swelling. The skull may become too full and increase the pressure on the brain tissue. This is called increased intracranial pressure.   WHAT IS CEREBRAL PERFUSION PRESSURE?  Blood flow to the brain is called cerebral perfusion pressure. Blood pressure and intracranial pressure affect the cerebral perfusion  pressure. If the blood pressure is low and/or the intracranial pressure is high, the blood flow to the brain may be limited. This causes decreased cerebral perfusion pressure.   HOW WILL THE PATIENT RESPOND TO A BRAIN INJURY?  The patient's responses may vary depending on the type of injury or pressure changes in the brain. Possible responses include:   · Agitation.   · Confusion.   · Patients may also have problems with speech, vision, or muscle weakness in their arms or legs.   · Decreased responses.   · Coma. A state of unresponsiveness when patients do not speak or follow commands, and are unaware of their surroundings. The length of time a patient remains in a coma varies.   HOW ARE BRAIN INJURIES EVALUATED?  Patients with brain injury require frequent assessments and diagnostic tests. These include:   · Neurological Exam: A series of questions and simple commands to see if the patient can open their eyes, move, speak, and understand what is going on around them. For example: What is your name? Where are you? What day is it? Wiggle your toes. Hold up two fingers. A standard way to describe patient responses may be used. Most hospitals use the [place] Coma Scale or [place] Levels of Cognitive Functioning. You can read about these scales and what the scores mean in the Appendix.   · X-ray: A picture that looks at bones to see if they are broken (fractured). It can also be used to take a picture of the chest to look at the lungs. This test may be done at the bedside or in the X-ray department and takes between 5-30 minutes to complete.   · CT Scan (CAT Scan): An X-ray that takes pictures of the brain or other parts of the body. The scan is painless but the patient must lie very still. The test takes 15-30 minutes to complete.   · MRI (Magnetic Resonance Imaging) Scan: A large magnet and radio waves are used, instead of X-rays, to take pictures of the body's tissues. It is painless but noisy. The machine is shaped  like a long tube. The patient must lie still on a flat table in the middle of the machine. The test takes about 60 minutes to complete.   · Angiogram: A test to look at the blood vessels in the brain. Using a catheter, or small flexible tube, dye is put into an artery (usually in the groin) that supplies blood to the brain. This test can tell if the blood vessels have been damaged or are spasming. The test takes 1-3 hours.   · ICP Monitor: A small tube placed into or just on top of the brain through a small hole in the skull. This will measure the pressure inside the brain (intracranial pressure).   · EEG (Electroencephalograph): A test to measure electrical activity in the brain. Special patches called electrodes are applied to the head to measure the activity. The test is painless and can be done at the bedside or in the EEG department. The length of the test varies.   HOW ARE BRAIN INJURIES TREATED?  The goals of brain injury treatment are to:   · Stop any bleeding.   · Prevent an increase in pressure within the skull.   · Control the amount of pressure, when it does increase.   · Maintain adequate blood flow to the brain.   · Remove any large blood clots.   TREATMENT   Treatments will vary with the type of injury. Your caregiver will decide which ones are used. Some common treatments are:  · Positioning: Usually the head of the bed will be elevated slightly and the neck kept straight. This position may decrease the intracranial pressure by allowing blood and CSF to drain from the brain. Please do not change the position of the bed without asking the nurse.   · Fluid Restriction: It may be necessary to limit the fluids that a patient receives. The brain is like a sponge. It swells with extra fluid. Limiting fluids can help control the swelling. Please do not give fluids without asking the nurse.   · Ventricular Drain (Ventriculostomy): A small tube is placed in the ventricle. It measures and controls pressure  "inside the skull. It can be used to drain some CSF (cerebrospinal fluid) from the brain.   · Ventilator: A machine used to support the patient in their own breathing, or give the patient breaths. When the ventilator gives extra breaths, the blood vessels in the brain become smaller. This may help control the intracranial pressure.   · Surgery: There are three types of surgery used with brain injury:   · Craniotomy - The skull is opened to relieve the causes of increased pressure inside the skull. Causes may be fractured bones, blood clots, or swollen brain tissue.   · Sprague holes - A small opening is made into the skull to remove blood clots.   · Bone flap removal - A piece of bone is removed from the skull to relieve pressure caused by swollen brain tissue.   MEDICATION  There are several types of medications used to treat brain injury. Some of these include:  · Diuretics are used to decrease the amount of water in the patient's body. This makes less water available to the brain for swelling.   · Anticonvulsants are used to prevent seizures. Seizures occur as a result of extra electrical activity in the brain. There are several types of seizures. The most common type causes the patient to have jerking movements of the arms and legs followed by sleep. Other types may cause slight tremors of the face, or staring spells. Please notify the nurse or doctor if you see any of these signs. Some patients have a seizure at the time of injury while others may develop seizures after the injury.   · Sedatives are given if the patient's intracranial pressure is very high and hard to control. This medicine puts the patient into a deep \"sleep\". This may help prevent more swelling and damage.   WHAT EQUIPMENT WILL YOU SEE WHEN YOU VISIT?  Depending on the type of brain injury, different kinds of equipment will be used. Ask a member of the health care team if you have any questions about equipment.   · Monitor: A machine that shows " "heart rate, breathing, blood pressure, intracranial pressure, and cerebral perfusion pressure.   · Head Dressing: A bandage around the head used to keep the wound or incision clean and dry.   · ICP Monitor: A small tube placed into or just on top of the brain through a small hole in the skull. This will measure the amount of pressure inside the brain (intracranial pressure).   · Nasogastric Tube (NG): A tube placed through the nose into the stomach that can be used to suction the stomach or provide liquid formula directly into the stomach.   · Endotracheal Tube: A tube inserted through the patient's nose or mouth into the trachea (windpipe) to help with breathing and suctioning.   · EKG Lead Wires: Wires connected to the chest with small patches that measure the heart rate and rhythm.   · Intravenous Catheter (IV) and Intravenous Fluid: A flexible catheter which allows fluid, nutrients, and medicine to be given directly into a vein.   · Ventilator: A machine used in the Intensive Care Unit to support the patient in their own breathing or give the patient breaths.   · Anti-Embolism Stockings (Frequently call TEDS): Long white stockings used to help prevent the pooling of blood in the legs.   · Sequential Compression Stockings (Frequently called Ulices's): Plastic leg wraps that help prevent blood clots by inflating and deflating around the legs.   · Urinary \"Rose\" Catheter: A tube inserted into the bladder to drain and allow for accurate measurement of urine.   WHAT OTHER TREATMENTS MAY BE USED?  · Antibiotics: Antibiotics are used to prevent and treat infections that occur. It is not unusual for people with brain injuries to get infections. They may get pneumonia, bladder infections, blood infections, or infections in the brain or cerebrospinal fluid called meningitis.   · Chest PT and Suctioning: To prevent or treat pneumonia, staff may use a vibrating machine or may clap on the patient's chest. This loosens the " phlegm in the lungs. Then the patient will be asked to cough. If the patient is not able to cough up the phlegm they must be suctioned. When a patient is suctioned a catheter is placed in the back of the throat or into the lungs.   · Tracheostomy (Trach): If the patient has a lot of lung secretions or is on a ventilator for a long time they may need a trach. A trach is a tube placed in the trachea (windpipe). It will make it easier for the patient to cough up phlegm. It also allows the nurse to suction the lungs. Initially the patient will be unable to talk while the trach is in place. As the patient improves, a talking trach may be used. A trach is usually not permanent.   · Suctioning of the Stomach: Sometimes after brain injury, the stomach will stop working for a short time. This is called an ileus. Even though the stomach may not be working it continues to make acid. The acid may damage the stomach lining and cause stomach ulcers if they are not removed. A nasogastric tube (NG) will be placed through the nose into the stomach. This tube will be used to help remove stomach secretions. Medications may also be given to help prevent stomach ulcers.   · Nutrition: Meeting nutrition and fluid needs is important after brain injury. Patients may be less active, yet have very high nutritional needs. At first, nutrition may be supplied by an IV. When the stomach starts working, an evaluation of chewing and swallowing safety will be completed. If the patient is too sleepy to eat, or unable to swallow, a small nasogastric feeding tube may be used for nutrition. The tube is placed through the nose into the stomach. Liquid formula will be given through the feeding tube. Feedings may be given continuously or several times a day. The dietician will assist with food and fluid selection. Milkshakes and liquid formulas may also be used to provide extra calories and high protein nutrition. A feeding tube may be used if the patient  continues to be too sleepy to eat or unable to swallow. A gastrostomy tube is a feeding tube that goes in the stomach. A jejunostomy tube is a feeding tube that goes in the intestine.   · Bowel and Bladder Care: Patients may not have control of their bowel or bladder. Catheters or diapers will be used until bowel and bladder control returns.   · Skin Care: Some things that help prevent bedsores include turning the patient, padding equipment, keeping skin clean and dry, using special mattresses, and making sure the patient gets enough calories.   · Range of Motion (ROM) and Splints: Patients with brain injury may not be able to move their joints as much as needed. This can cause tight muscles and joints called contractures. Range of motion (ROM) exercises and special splints for hands and feet help prevent contractures.   · Pain Control: Comfort measures and medication will be used for pain control. However, medications may be limited to types that do not cause drowsiness.   WHO WILL HELP AFTER BRAIN INJURY?  Members of the health care team will work together with the patient, family, and friends during the hospital stay. Care will be centered on the individual needs of the patient. Family and friends are important members of the team.   · Patient: The patient is the most important member of the team. Care will be planned based on how the patient responds to treatment.   · Family and Friends: You provide emotional support to the patient. Family and friends also provide the health care team with important facts about the patient's past history and can help watch for changes. Other team members will show you what you can do to help with the recovery process.   · Doctors: Neurosurgery doctors are specialists who help determine the type of brain injury and its treatment. They may perform surgery on the brain. They will work with other doctors if the patient is in intensive care or has injuries to other parts of the body.    · Nurses: Nurses check patient's vitals (temperature, blood pressure, heart and breathing rate) and watch for changes in strength and thinking. They help with daily cares such as eating and bathing. Nurses also coordinate care among the members of the health care team.   · Social Workers: Social workers provide emotional support to help the patient and family adjust to being in the hospital. They coordinate discharge planning, referral to community resources, and answer questions about insurance or disability.   · Physical Therapists (PT): Physical therapists evaluate and treat weaknesses in the patient's strength, flexibility, balance, rolling, sitting, standing and walking. Treatment may include exercises or instruction in use of equipment such as walkers, canes, or wheelchairs.   · Occupational Therapists (OT): Occupational therapists evaluate the patient's ability to perform dressing, bathing, homemaking and activities that require memory and organization. They provide treatment or equipment needed for safe independent living.   · Speech Therapists: Speech therapists test and treat speech, language, thinking and swallowing problems.   · Neuropsychologists: Neuropsychologists test thinking, memory, judgment, emotions, behavior and personality. This information can be used to help guide treatment. It will also help determine the amount of supervision that the patient needs when they leave the hospital.   · Dieticians: Dieticians assess nutritional needs. They work with the patient and other team members to help the patient meet their nutritional goals.   · Other staff members may work with the patient and family. These include:   · Respiratory therapists.   · Activity Therapists.   · Clergy.   · Child Life Therapists.   · Patient Representatives.   · Vocational Counselors.   · Music Therapists.   · Recreation therapists.   HOW WILL YOU REACT?  When a friend or family member is hospitalized with a brain injury,  it is normal for you to have many different emotions. These emotions will come and go at different times.   · Panic and Fear: Panic and fear are common reactions after a family member has a brain injury. Fears are intense because you are worried the patient may not survive. Until the patient becomes medically stable, your physical and emotional signs of panic may continue. These symptoms include rapid breathing, inability to sleep, decreased appetite and upset stomach. Some people may cry uncontrollably.   · Shock and Denial: You may feel that what is happening is not real. You may notice things going on around you, but have trouble remembering information and conversations or meetings with others. You may also have a hard time understanding the seriousness of the injury that has occurred.   · Anger: Many people feel angry that they or their loved ones are in this situation. This may be justified. You may be angry with the patient for putting themselves in a situation where they could be hurt. You may also be angry with family members, friends, or others involved in the accident. You may be upset with the health care team for not doing or saying what you think is right. These are all normal reactions.   · Guilt: Guilt is also a normal reaction. You may feel you could have done something to prevent the accident from happening, even when this may be far from true. You may also think about past events and personal experiences with the patient that you wish could have been different or better. If you are feeling angry with the patient, you may also feel guilty about your anger. We encourage you to talk about your feelings with someone close to you or a professional staff member.   · Isolation: During this time you may feel distant from others. In this strange situation, you may have a hard time relating to others. You may think that others will not understand. You may isolate yourself because you think others are scared  "or disapprove of your feelings. However, a crisis such as a brain injury is a time when it is helpful to accept comfort, support and assistance from others.   · Hope: As the patient begins to stabilize, your anxiety about survival will be combined with hope of recovery. Medical complications and slow recovery may increase anxiety. However, hope may be brought about by the smallest changes.   Any of these emotions are normal reactions to a very stressful situation. You may find it helpful to discuss your feelings with friends, family, or the health care team. It may also be useful to write about your feelings and experiences in a daily journal.   WHAT ARE SUGGESTIONS FOR COPING?  People cope with stressful situations in different ways. What works for one person may not be helpful to another. We hope some of these suggestions will help you get through this difficult time.   · Write important information down in a journal or notebook. You can use this to keep track of questions you want to ask members of the health care team. It may also be useful to share with the patient.   · Establish a \"phone tree.\" Name one person for family and friends to call for information on the patient's condition.   · Rotate family visits. If you need or want to leave the hospital, you may want to have someone stay with the patient so you can feel reassured the patient is not alone.   · When someone offers to help, accept the offer. Try to be specific about how this person can help.   · Express your feelings. Discuss your positive and negative feelings with family members, friends, and staff.   · Be kind to yourself. Take time for a walk or have a meal with a friend. Also, try to leave the hospital for a meal or restful night of sleep. It is very important to take care of yourself. By taking care of your own needs, you will be more prepared to make good decisions and support your loved one.   WHAT ARE THE SIGNS OF IMPROVEMENT?  \"How long " "will it take for my loved one to get better?\" \"What will he or she be like?\" Your health care team may have a hard time answering these questions. Age, extent of damage, length of time since injury, and past mental and physical health of the patient are factors used when predicting the extent of recovery.   Most patients follow a general pattern of recovery after a severe brain injury. This pattern is divided into stages. It is important to know each patient is different and may not follow the stages exactly. Patients vary in the amount of time spent at each stage and their recovery may stop at any stage. Recovery may be grouped into the following four stages:  Stage 1: Unresponsiveness: During this stage the patient does not respond consistently or appropriately. You may hear this stage referred to as a coma. You may notice different movements in the patient. These are referred to as reflexive or generalized responses.  · Decerebrate: A reflex that causes straightening of the arms and legs.   · Decorticate: A reflex that causes bending of the arms and straightening of the legs.   · Generalized responses: Random movement of the arms and legs for no specific reason.   Stage 2: Early Responses: During this stage the patient starts to respond to things that are happening to them. The responses will be more appropriate, but may be inconsistent or slow. The patient will start to have localized responses and follow simple commands. Some examples of early responses to watch for are:  Localized response: These are appropriate movements by the patient in response to sound, touch, or sight. Turning toward a sound, pulling away from something uncomfortable, or following movement with the eyes are examples.   Following simple commands: Opening and closing eyes, sticking the tongue out, or gripping and releasing hands when asked are examples.  Stage 3. Agitated and Confused: At this stage the patient is responding more " consistently. The patient will be confused about where he or she is and what has happened. The patient will have difficulty with memory and behavior. The patient's confusion may lead to yelling, swearing, biting, or striking out. Do not be alarmed if soft wrist and ankle ties are used to protect the patient and prevent tubes from being pulled out. It is very important to remember this stage is a step toward recovery and this behavior is not intentional.  Stage 4. Higher Level Responses: The patient completes routine tasks without difficulty, but still needs help with problem solving, making judgments and decisions. The patient may not understand his or her limitations and safety is a big concern. Unusual or high stress situations make activities more difficult. The patient may seem more like the person you knew before. However, there may be personality changes.  Unfortunately, there is no way to predict how long a person will remain in one stage or what the final outcome will be. The team will work during the hospital stay to achieve the best possible outcome.   HOW CAN YOU HELP WITH RECOVERY?  The family and friends of a person with a brain injury are important members of the team. Your knowledge about the patient's emotional and physical needs is valuable, and so is your participation in helping take care of these needs.  The following are suggestions for things you can do that correspond with the stages of recovery.  Stage 1. Unresponsive Stage  · At this stage the patient appears to be in a deep sleep and does not respond to their surroundings. The goal is to obtain a response to touch, sound, sight or smell.   · When speaking to the patient, assume he or she understands what you are saying. Speak in a comforting, positive and familiar way.   · Speak clearly and slowly about familiar people and memories.   · When visitors are present, focus on the patient. Keep the number of visitors to 1 or 2 people at a time.  "Visits should be short. Other distractions (TV, radio) should be turned off when visiting.   · Provide the patient with pictures, music, and personal items that are comforting and familiar. Use poster board or a bulletin board near the bed.   · The nurses and therapists may encourage you to assist in care of the patient. You may be asked to help with hair care, shaving, applying skin lotion or gently stretching and positioning the patient's arms and legs. If you do not feel comfortable with these activities, that is okay. The staff will understand.   Stage 2. Early Responses  · At this stage the patient is beginning to respond to people and hospital surroundings. The responses may range from turning toward a familiar voice to moving an arm or leg on request.   · The goal is to increase the consistency of responses.   · There may be a delayed response time when asking the patient to move, speak, or pay attention. Always wait 1-2 minutes for the requested response. Repeat your request only a couple of times during this time period.   · Be aware that the patient's attention span may only be 5-10 minutes before fatigue and frustration set in.   · Allow for rest periods. Turn off the TV, music, and lights, and limit visitors. The patient can become stressed by too much noise, light or stimulation.   · Continue with suggestions listed in the \"unresponsive\" stage.   Stage 3. Agitated and Confused Responses  · During this stage, things are confusing. The patient may begin to remember past events but may be unsure of surroundings and the reason for hospitalization. The goal is to help the patient become oriented and to continue to treat his or her physical needs. Provide one activity at a time and expect the patient to pay attention for only short periods. Keeping the noise level low helps the patient focus.   · The patient may repeat a word, phrase, or activity over and over. Try to interest the patient in a different " activity.   · The patient may do socially unacceptable things during this time, such as swearing or hitting. This is common. Calmly tell the patient the behavior is not appropriate.   · Help orient the patient to his or her surroundings with both visual and verbal information (such as the suggestions below). Remembering information from one time to another is difficult.   · A calendar with the days marked off.   · A sign in the room telling them where they are.   · A posted schedule with meal times, therapies, and special appointments.   · To decrease frustration, allow the patient to move about with supervision.   Stage 4. Higher Level Responses  · At this stage the patient is able to take part in daily routines with help for problem solving, making judgments, and decisions. Most of the suggestions from the previous stage continue to apply here. The goal is to decrease the amount of supervision needed and increase independence.   · Help make the environment safe. Safety decisions may still be difficult for the patient to make. Praise safe decisions and give calm explanations about unsafe decisions. Learning is still difficult.   · Encourage the use of memory aids, such as a date book, to help with appointments and daily routines.   · Encourage brief rest periods because the patient will continue to need more rest.   · Check with the health care team on activities that may be completed with or without supervision. These activities may include work or school re-entry, taking medications, driving, or managing money.   WHAT HAPPENS AFTER ACUTE CARE?  · Discharge Planning: Early in the hospital stay, the  will meet with the patient and family to start discharge planning. Many patients will need care or therapy after they leave acute care. Some patients will be discharged to a nursing facility, while others will be discharged to their homes.   · Discharge to a Nursing Facility: There are several types of  nursing facilities.   · Skilled care provides extensive nursing care and daily therapy. Many patients will start with skilled care and then move to acute rehabilitation care, intermediate care, or residential care.   · Acute rehabilitation provides an inpatient program of intense therapy in a hospital. The patient will need to actively participate in 3 to 6 hours of therapy per day (i.e. physical, occupational, speech, and activity therapy).   · Intermediate care provides less extensive nursing care and therapy. Residential care is for patients who are fairly independent and do not need routine nursing care or therapies.   · Discharge to Home: If the patient goes home, they may still need therapy or other care. Some of the options are:   · Outpatient therapy is provided at hospitals, clinics and some nursing homes. Outpatient physical therapy will help build up strength and endurance. Outpatient occupational, speech and cognitive therapy may also be needed. Family or friends may need to arrange transportation for therapy appointments.   · Home health care programs are available in many communities. Some of the services they offer include in-home nursing care, homemaker and health aides, meals-on-wheels, adult day care, home therapy visits, medical equipment rental/purchase, and transportation.   MAKING THE CHOICE  During the acute care hospital stay, the health care team will make recommendations about the level of care the patient will need at discharge. Then the patient and family will choose the agency that provides the services. Your choice may be based on insurance coverage, location, the patient's potential for recovery, and the patient's or family's feelings about the services provided. Some facilities will come to the hospital to see the patient and their medical record before they decide to accept the patient. If accepted, the date for transfer or discharge will be set.   Document Released: 12/18/2006  Document Revised: 03/11/2013 Document Reviewed: 01/14/2008  [employer]® Patient Information ©2013 [employer].  Concussion, Adult  A concussion is a brain injury. It is caused by:  · A hit to the head.  · A quick and sudden movement (jolt) of the head or neck.  A concussion is usually not life threatening. Even so, it can cause serious problems. If you had a concussion before, you may have concussion-like problems after a hit to your head.  HOME CARE  General Instructions  · Follow your doctor's directions carefully.  · Take medicines only as told by your doctor.  · Only take medicines your doctor says are safe.  · Do not drink alcohol until your doctor says it is okay. Alcohol and some drugs can slow down healing. They can also put you at risk for further injury.  · If you are having trouble remembering things, write them down.  · Try to do one thing at a time if you get distracted easily. For example, do not watch TV while making dinner.  · Talk to your family members or close friends when making important decisions.  · Follow up with your doctor as told.  · Watch your symptoms. Tell others to do the same. Serious problems can sometimes happen after a concussion. Older adults are more likely to have these problems.  · Tell your teachers, school nurse, school counselor, , , or  about your concussion. Tell them about what you can or cannot do. They should watch to see if:  · It gets even harder for you to pay attention or concentrate.  · It gets even harder for you to remember things or learn new things.  · You need more time than normal to finish things.  · You become annoyed (irritable) more than before.  · You are not able to deal with stress as well.  · You have more problems than before.  · Rest. Make sure you:  · Get plenty of sleep at night.  · Go to sleep early.  · Go to bed at the same time every day. Try to wake up at the same time.  · Rest during the day.  · Take naps when  you feel tired.  · Limit activities where you have to think a lot or concentrate. These include:  · Doing homework.  · Doing work related to a job.  · Watching TV.  · Using the computer.  Returning To Your Regular Activities  Return to your normal activities slowly, not all at once. You must give your body and brain enough time to heal.   · Do not play sports or do other athletic activities until your doctor says it is okay.  · Ask your doctor when you can drive, ride a bicycle, or work other vehicles or machines. Never do these things if you feel dizzy.  · Ask your doctor about when you can return to work or school.  Preventing Another Concussion  It is very important to avoid another brain injury, especially before you have healed. In rare cases, another injury can lead to permanent brain damage, brain swelling, or death. The risk of this is greatest during the first 7-10 days after your injury. Avoid injuries by:   · Wearing a seat belt when riding in a car.  · Not drinking too much alcohol.  · Avoiding activities that could lead to a second concussion (such as contact sports).  · Wearing a helmet when doing activities like:  · Biking.  · Skiing.  · Skateboarding.  · Skating.  · Making your home safer by:  · Removing things from the floor or stairways that could make you trip.  · Using grab bars in bathrooms and handrails by stairs.  · Placing non-slip mats on floors and in bathtubs.  · Improve lighting in dark areas.  GET HELP IF:  · It gets even harder for you to pay attention or concentrate.  · It gets even harder for you to remember things or learn new things.  · You need more time than normal to finish things.  · You become annoyed (irritable) more than before.  · You are not able to deal with stress as well.  · You have more problems than before.  · You have problems keeping your balance.  · You are not able to react quickly when you should.  Get help if you have any of these problems for more than 2 weeks:    · Lasting (chronic) headaches.  · Dizziness or trouble balancing.  · Feeling sick to your stomach (nausea).  · Seeing (vision) problems.  · Being affected by noises or light more than normal.  · Feeling sad, low, down in the dumps, blue, gloomy, or empty (depressed).  · Mood changes (mood swings).  · Feeling of fear or nervousness about what may happen (anxiety).  · Feeling annoyed.  · Memory problems.  · Problems concentrating or paying attention.  · Sleep problems.  · Feeling tired all the time.  GET HELP RIGHT AWAY IF:   · You have bad headaches or your headaches get worse.  · You have weakness (even if it is in one hand, leg, or part of the face).  · You have loss of feeling (numbness).  · You feel off balance.  · You keep throwing up (vomiting).  · You feel tired.  · One black center of your eye (pupil) is larger than the other.  · You twitch or shake violently (convulse).  · Your speech is not clear (slurred).  · You are more confused, easily angered (agitated), or annoyed than before.  · You have more trouble resting than before.  · You are unable to recognize people or places.  · You have neck pain.  · It is difficult to wake you up.  · You have unusual behavior changes.  · You pass out (lose consciousness).  MAKE SURE YOU:   · Understand these instructions.  · Will watch your condition.  · Will get help right away if you are not doing well or get worse.     This information is not intended to replace advice given to you by your health care provider. Make sure you discuss any questions you have with your health care provider.     Document Released: 12/06/2010 Document Revised: 01/08/2016 Document Reviewed: 07/10/2014  [employer] Interactive Patient Education ©2016 [employer]    Contusion  A contusion is a deep bruise. Contusions happen when an injury causes bleeding under the skin. Signs of bruising include pain, puffiness (swelling), and discolored skin. The contusion may turn blue, purple, or  yellow.  HOME CARE   · Put ice on the injured area.  · Put ice in a plastic bag.  · Place a towel between your skin and the bag.  · Leave the ice on for 15-20 minutes, 03-04 times a day.  · Only take medicine as told by your doctor.  · Rest the injured area.  · If possible, raise (elevate) the injured area to lessen puffiness.  GET HELP RIGHT AWAY IF:   · You have more bruising or puffiness.  · You have pain that is getting worse.  · Your puffiness or pain is not helped by medicine.  MAKE SURE YOU:   · Understand these instructions.  · Will watch your condition.  · Will get help right away if you are not doing well or get worse.     This information is not intended to replace advice given to you by your health care provider. Make sure you discuss any questions you have with your health care provider.     Document Released: 06/05/2009 Document Revised: 03/11/2013 Document Reviewed: 10/22/2012  [employer] Interactive Patient Education ©2016 [employer]    Head Injury, Adult  You have received a head injury. It does not appear serious at this time. Headaches and vomiting are common following head injury. It should be easy to awaken from sleeping. Sometimes it is necessary for you to stay in the emergency department for a while for observation. Sometimes admission to the hospital may be needed. After injuries such as yours, most problems occur within the first 24 hours, but side effects may occur up to 7-10 days after the injury. It is important for you to carefully monitor your condition and contact your health care provider or seek immediate medical care if there is a change in your condition.  WHAT ARE THE TYPES OF HEAD INJURIES?  Head injuries can be as minor as a bump. Some head injuries can be more severe. More severe head injuries include:  · A jarring injury to the brain (concussion).  · A bruise of the brain (contusion). This mean there is bleeding in the brain that can cause swelling.  · A cracked skull (skull  fracture).  · Bleeding in the brain that collects, clots, and forms a bump (hematoma).  WHAT CAUSES A HEAD INJURY?  A serious head injury is most likely to happen to someone who is in a car wreck and is not wearing a seat belt. Other causes of major head injuries include bicycle or motorcycle accidents, sports injuries, and falls.  HOW ARE HEAD INJURIES DIAGNOSED?  A complete history of the event leading to the injury and your current symptoms will be helpful in diagnosing head injuries. Many times, pictures of the brain, such as CT or MRI are needed to see the extent of the injury. Often, an overnight hospital stay is necessary for observation.   WHEN SHOULD I SEEK IMMEDIATE MEDICAL CARE?   You should get help right away if:  · You have confusion or drowsiness.  · You feel sick to your stomach (nauseous) or have continued, forceful vomiting.  · You have dizziness or unsteadiness that is getting worse.  · You have severe, continued headaches not relieved by medicine. Only take over-the-counter or prescription medicines for pain, fever, or discomfort as directed by your health care provider.  · You do not have normal function of the arms or legs or are unable to walk.  · You notice changes in the black spots in the center of the colored part of your eye (pupil).  · You have a clear or bloody fluid coming from your nose or ears.  · You have a loss of vision.  During the next 24 hours after the injury, you must stay with someone who can watch you for the warning signs. This person should contact local emergency services (911 in the [place] if you have seizures, you become unconscious, or you are unable to wake up.  HOW CAN I PREVENT A HEAD INJURY IN THE FUTURE?  The most important factor for preventing major head injuries is avoiding motor vehicle accidents.  To minimize the potential for damage to your head, it is crucial to wear seat belts while riding in motor vehicles. Wearing helmets while bike riding and playing  collision sports (like football) is also helpful. Also, avoiding dangerous activities around the house will further help reduce your risk of head injury.   WHEN CAN I RETURN TO NORMAL ACTIVITIES AND ATHLETICS?  You should be reevaluated by your health care provider before returning to these activities. If you have any of the following symptoms, you should not return to activities or contact sports until 1 week after the symptoms have stopped:  · Persistent headache.  · Dizziness or vertigo.  · Poor attention and concentration.  · Confusion.  · Memory problems.  · Nausea or vomiting.  · Fatigue or tire easily.  · Irritability.  · Intolerant of bright lights or loud noises.  · Anxiety or depression.  · Disturbed sleep.  MAKE SURE YOU:   · Understand these instructions.  · Will watch your condition.  · Will get help right away if you are not doing well or get worse.     This information is not intended to replace advice given to you by your health care provider. Make sure you discuss any questions you have with your health care provider.     Document Released: 12/18/2006 Document Revised: 01/08/2016 Document Reviewed: 08/25/2014  [employer] Interactive Patient Education ©2016 [employer]
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
## 2018-04-17 ENCOUNTER — APPOINTMENT (OUTPATIENT)
Dept: RADIOLOGY | Facility: MEDICAL CENTER | Age: 43
End: 2018-04-17
Attending: EMERGENCY MEDICINE
Payer: COMMERCIAL

## 2018-04-17 ENCOUNTER — HOSPITAL ENCOUNTER (EMERGENCY)
Facility: MEDICAL CENTER | Age: 43
End: 2018-04-17
Attending: EMERGENCY MEDICINE
Payer: COMMERCIAL

## 2018-04-17 ENCOUNTER — APPOINTMENT (OUTPATIENT)
Dept: RADIOLOGY | Facility: IMAGING CENTER | Age: 43
End: 2018-04-17
Attending: PHYSICIAN ASSISTANT
Payer: COMMERCIAL

## 2018-04-17 ENCOUNTER — OFFICE VISIT (OUTPATIENT)
Dept: URGENT CARE | Facility: CLINIC | Age: 43
End: 2018-04-17
Payer: COMMERCIAL

## 2018-04-17 VITALS
WEIGHT: 223.33 LBS | TEMPERATURE: 97.8 F | BODY MASS INDEX: 31.97 KG/M2 | HEART RATE: 78 BPM | RESPIRATION RATE: 18 BRPM | SYSTOLIC BLOOD PRESSURE: 116 MMHG | DIASTOLIC BLOOD PRESSURE: 80 MMHG | HEIGHT: 70 IN | OXYGEN SATURATION: 100 %

## 2018-04-17 VITALS
TEMPERATURE: 97.6 F | WEIGHT: 223.11 LBS | HEART RATE: 68 BPM | HEIGHT: 70 IN | RESPIRATION RATE: 18 BRPM | BODY MASS INDEX: 31.94 KG/M2 | DIASTOLIC BLOOD PRESSURE: 89 MMHG | OXYGEN SATURATION: 99 % | SYSTOLIC BLOOD PRESSURE: 128 MMHG

## 2018-04-17 DIAGNOSIS — R51.9 ACUTE INTRACTABLE HEADACHE, UNSPECIFIED HEADACHE TYPE: ICD-10-CM

## 2018-04-17 DIAGNOSIS — R51.9 ACUTE NONINTRACTABLE HEADACHE, UNSPECIFIED HEADACHE TYPE: ICD-10-CM

## 2018-04-17 DIAGNOSIS — M54.50 ACUTE MIDLINE LOW BACK PAIN WITHOUT SCIATICA: ICD-10-CM

## 2018-04-17 LAB
ANION GAP SERPL CALC-SCNC: 6 MMOL/L (ref 0–11.9)
BASOPHILS # BLD AUTO: 0.6 % (ref 0–1.8)
BASOPHILS # BLD: 0.03 K/UL (ref 0–0.12)
BUN SERPL-MCNC: 16 MG/DL (ref 8–22)
CALCIUM SERPL-MCNC: 9 MG/DL (ref 8.4–10.2)
CHLORIDE SERPL-SCNC: 107 MMOL/L (ref 96–112)
CO2 SERPL-SCNC: 25 MMOL/L (ref 20–33)
CREAT SERPL-MCNC: 0.86 MG/DL (ref 0.5–1.4)
EOSINOPHIL # BLD AUTO: 0.08 K/UL (ref 0–0.51)
EOSINOPHIL NFR BLD: 1.7 % (ref 0–6.9)
ERYTHROCYTE [DISTWIDTH] IN BLOOD BY AUTOMATED COUNT: 39.1 FL (ref 35.9–50)
GLUCOSE SERPL-MCNC: 90 MG/DL (ref 65–99)
HCT VFR BLD AUTO: 44.5 % (ref 42–52)
HGB BLD-MCNC: 16.5 G/DL (ref 14–18)
IMM GRANULOCYTES # BLD AUTO: 0.01 K/UL (ref 0–0.11)
IMM GRANULOCYTES NFR BLD AUTO: 0.2 % (ref 0–0.9)
LYMPHOCYTES # BLD AUTO: 1.9 K/UL (ref 1–4.8)
LYMPHOCYTES NFR BLD: 39.3 % (ref 22–41)
MCH RBC QN AUTO: 34 PG (ref 27–33)
MCHC RBC AUTO-ENTMCNC: 37.1 G/DL (ref 33.7–35.3)
MCV RBC AUTO: 91.8 FL (ref 81.4–97.8)
MONOCYTES # BLD AUTO: 0.41 K/UL (ref 0–0.85)
MONOCYTES NFR BLD AUTO: 8.5 % (ref 0–13.4)
NEUTROPHILS # BLD AUTO: 2.41 K/UL (ref 1.82–7.42)
NEUTROPHILS NFR BLD: 49.7 % (ref 44–72)
NRBC # BLD AUTO: 0 K/UL
NRBC BLD-RTO: 0 /100 WBC
PLATELET # BLD AUTO: 200 K/UL (ref 164–446)
PMV BLD AUTO: 8.9 FL (ref 9–12.9)
POTASSIUM SERPL-SCNC: 3.6 MMOL/L (ref 3.6–5.5)
RBC # BLD AUTO: 4.85 M/UL (ref 4.7–6.1)
SODIUM SERPL-SCNC: 138 MMOL/L (ref 135–145)
WBC # BLD AUTO: 4.8 K/UL (ref 4.8–10.8)

## 2018-04-17 PROCEDURE — 85025 COMPLETE CBC W/AUTO DIFF WBC: CPT

## 2018-04-17 PROCEDURE — 99284 EMERGENCY DEPT VISIT MOD MDM: CPT

## 2018-04-17 PROCEDURE — A9270 NON-COVERED ITEM OR SERVICE: HCPCS | Performed by: EMERGENCY MEDICINE

## 2018-04-17 PROCEDURE — 700117 HCHG RX CONTRAST REV CODE 255: Performed by: EMERGENCY MEDICINE

## 2018-04-17 PROCEDURE — 700102 HCHG RX REV CODE 250 W/ 637 OVERRIDE(OP): Performed by: EMERGENCY MEDICINE

## 2018-04-17 PROCEDURE — 36415 COLL VENOUS BLD VENIPUNCTURE: CPT

## 2018-04-17 PROCEDURE — 72100 X-RAY EXAM L-S SPINE 2/3 VWS: CPT | Mod: TC | Performed by: PHYSICIAN ASSISTANT

## 2018-04-17 PROCEDURE — 70496 CT ANGIOGRAPHY HEAD: CPT

## 2018-04-17 PROCEDURE — 96374 THER/PROPH/DIAG INJ IV PUSH: CPT

## 2018-04-17 PROCEDURE — 700111 HCHG RX REV CODE 636 W/ 250 OVERRIDE (IP): Performed by: EMERGENCY MEDICINE

## 2018-04-17 PROCEDURE — 700105 HCHG RX REV CODE 258: Performed by: EMERGENCY MEDICINE

## 2018-04-17 PROCEDURE — 96375 TX/PRO/DX INJ NEW DRUG ADDON: CPT

## 2018-04-17 PROCEDURE — 80048 BASIC METABOLIC PNL TOTAL CA: CPT

## 2018-04-17 PROCEDURE — 99214 OFFICE O/P EST MOD 30 MIN: CPT | Performed by: PHYSICIAN ASSISTANT

## 2018-04-17 RX ORDER — DIPHENHYDRAMINE HYDROCHLORIDE 50 MG/ML
50 INJECTION INTRAMUSCULAR; INTRAVENOUS ONCE
Status: COMPLETED | OUTPATIENT
Start: 2018-04-17 | End: 2018-04-17

## 2018-04-17 RX ORDER — SODIUM CHLORIDE 9 MG/ML
1000 INJECTION, SOLUTION INTRAVENOUS ONCE
Status: COMPLETED | OUTPATIENT
Start: 2018-04-17 | End: 2018-04-17

## 2018-04-17 RX ORDER — DEXAMETHASONE SODIUM PHOSPHATE 10 MG/ML
10 INJECTION, SOLUTION INTRAMUSCULAR; INTRAVENOUS ONCE
Status: COMPLETED | OUTPATIENT
Start: 2018-04-17 | End: 2018-04-17

## 2018-04-17 RX ORDER — METOCLOPRAMIDE 10 MG/1
10 TABLET ORAL 4 TIMES DAILY PRN
Qty: 20 TAB | Refills: 0 | Status: SHIPPED | OUTPATIENT
Start: 2018-04-17 | End: 2019-08-12

## 2018-04-17 RX ORDER — KETOROLAC TROMETHAMINE 30 MG/ML
60 INJECTION, SOLUTION INTRAMUSCULAR; INTRAVENOUS ONCE
Status: COMPLETED | OUTPATIENT
Start: 2018-04-17 | End: 2018-04-17

## 2018-04-17 RX ORDER — METOCLOPRAMIDE HYDROCHLORIDE 5 MG/ML
10 INJECTION INTRAMUSCULAR; INTRAVENOUS ONCE
Status: COMPLETED | OUTPATIENT
Start: 2018-04-17 | End: 2018-04-17

## 2018-04-17 RX ORDER — KETOROLAC TROMETHAMINE 30 MG/ML
30 INJECTION, SOLUTION INTRAMUSCULAR; INTRAVENOUS ONCE
Status: COMPLETED | OUTPATIENT
Start: 2018-04-17 | End: 2018-04-17

## 2018-04-17 RX ORDER — IBUPROFEN 600 MG/1
600 TABLET ORAL EVERY 6 HOURS PRN
COMMUNITY
End: 2023-09-22

## 2018-04-17 RX ORDER — ACETAMINOPHEN 325 MG/1
650 TABLET ORAL ONCE
Status: COMPLETED | OUTPATIENT
Start: 2018-04-17 | End: 2018-04-17

## 2018-04-17 RX ADMIN — KETOROLAC TROMETHAMINE 30 MG: 30 INJECTION, SOLUTION INTRAMUSCULAR at 18:10

## 2018-04-17 RX ADMIN — DIPHENHYDRAMINE HYDROCHLORIDE 50 MG: 50 INJECTION, SOLUTION INTRAMUSCULAR; INTRAVENOUS at 18:10

## 2018-04-17 RX ADMIN — IOHEXOL 100 ML: 350 INJECTION, SOLUTION INTRAVENOUS at 18:50

## 2018-04-17 RX ADMIN — ACETAMINOPHEN 650 MG: 325 TABLET, FILM COATED ORAL at 18:10

## 2018-04-17 RX ADMIN — DEXAMETHASONE SODIUM PHOSPHATE 10 MG: 10 INJECTION, SOLUTION INTRAMUSCULAR; INTRAVENOUS at 18:10

## 2018-04-17 RX ADMIN — METOCLOPRAMIDE 10 MG: 5 INJECTION, SOLUTION INTRAMUSCULAR; INTRAVENOUS at 18:10

## 2018-04-17 RX ADMIN — KETOROLAC TROMETHAMINE 60 MG: 30 INJECTION, SOLUTION INTRAMUSCULAR; INTRAVENOUS at 16:18

## 2018-04-17 RX ADMIN — SODIUM CHLORIDE 1000 ML: 9 INJECTION, SOLUTION INTRAVENOUS at 18:10

## 2018-04-17 ASSESSMENT — ENCOUNTER SYMPTOMS
NECK PAIN: 0
HEADACHES: 1
SHORTNESS OF BREATH: 0
CHILLS: 0
NAUSEA: 1
BACK PAIN: 1
BLURRED VISION: 0
FEVER: 0
DIARRHEA: 0
BLURRED VISION: 0
BACK PAIN: 0
PHOTOPHOBIA: 1
NAUSEA: 0
ABDOMINAL PAIN: 0
FEVER: 0
ABDOMINAL PAIN: 0
MYALGIAS: 0
VOMITING: 1
HEADACHES: 1
DIZZINESS: 0
DIZZINESS: 0
EYE PAIN: 0
VOMITING: 0
SHORTNESS OF BREATH: 0
NECK PAIN: 0
SORE THROAT: 0
DOUBLE VISION: 0
CHILLS: 0
DIARRHEA: 0

## 2018-04-17 ASSESSMENT — PAIN SCALES - GENERAL
PAINLEVEL_OUTOF10: 10
PAINLEVEL_OUTOF10: 0
PAINLEVEL_OUTOF10: 10

## 2018-04-18 NOTE — PROGRESS NOTES
"Subjective:      Jarod Fields is a 43 y.o. male who presents with Headache (x2weeks, pulsing headaches, randomly gets worse, effecting vision, lower back pain)            Headache    This is a new problem. The current episode started 1 to 4 weeks ago (2 weeks). The problem occurs constantly. The problem has been unchanged. Associated symptoms include back pain. Pertinent negatives include no abdominal pain, blurred vision, dizziness, fever, nausea, neck pain or vomiting.     Patient presents to urgent care reporting a 2 week history of headache that has been changing location and waxing and waning in severity. No history of previous headaches. No recent head trauma. He has tried taking OTC nsaids and imitrex without any relief. Today while at work he stood up from a seated position and felt a sudden sharp pain in his lower back accompanied by a worsening headache. No history of chronic low back pain. No fevers, chills, body aches, chest pain, neck pain, abdominal pain, nausea, vomiting, visual change, weight loss, or dizziness. He is not currently taking any regular medications.    Review of Systems   Constitutional: Negative for chills and fever.   HENT: Negative for congestion.    Eyes: Negative for blurred vision and double vision.   Respiratory: Negative for shortness of breath.    Cardiovascular: Negative for chest pain.   Gastrointestinal: Negative for abdominal pain, diarrhea, nausea and vomiting.   Genitourinary: Negative.    Musculoskeletal: Positive for back pain. Negative for myalgias and neck pain.   Neurological: Positive for headaches. Negative for dizziness.   All other systems reviewed and are negative.         Objective:     /80   Pulse 78   Temp 36.6 °C (97.8 °F)   Resp 18   Ht 1.778 m (5' 10\")   Wt 101.3 kg (223 lb 5.2 oz)   SpO2 100%   BMI 32.04 kg/m²      Physical Exam   Constitutional: He is oriented to person, place, and time. He appears well-developed and well-nourished. He " appears distressed.   HENT:   Head: Normocephalic and atraumatic.   Right Ear: External ear normal.   Left Ear: External ear normal.   Mouth/Throat: Oropharynx is clear and moist. No oropharyngeal exudate.   Eyes: Conjunctivae and EOM are normal. Pupils are equal, round, and reactive to light.   Neck: Normal range of motion.   Cardiovascular: Normal rate, regular rhythm and normal heart sounds.    No murmur heard.  Pulmonary/Chest: Effort normal and breath sounds normal. No respiratory distress. He has no wheezes. He has no rales.   Abdominal: Normal appearance.   Musculoskeletal:        Lumbar back: He exhibits pain. He exhibits normal range of motion, no tenderness, no bony tenderness and no spasm.        Back:    Patient reports pain in midline lumbar region. No midline spinal tenderness present or step off deformities noted.    Neurological: He is alert and oriented to person, place, and time.   Skin: Skin is warm. He is diaphoretic.   Psychiatric: He has a normal mood and affect. His behavior is normal.   Nursing note and vitals reviewed.         PMH:  has a past medical history of Pain (2/18/15). He also has no past medical history of ASTHMA; CAD (coronary artery disease); Cancer (CMS-HCC); Congestive heart failure (CMS-HCC); COPD; Diabetes; Hypertension; Infectious disease; Liver disease; Psychiatric disorder; Renal disorder; Seizure disorder (CMS-HCC); or Stroke (CMS-HCC).  MEDS:   Current Outpatient Prescriptions:   •  ibuprofen (MOTRIN) 600 MG Tab, Take 600 mg by mouth every 6 hours as needed., Disp: , Rfl:   •  Lido-Capsaicin-Men-Methyl Sal (MEDI-PATCH-LIDOCAINE EX), by Apply externally route., Disp: , Rfl:   •  oxycodone-acetaminophen (PERCOCET-10)  MG Tab, Take 1-2 Tabs by mouth every four hours as needed for Severe Pain., Disp: , Rfl:   •  pregabalin (LYRICA) 300 MG capsule, Take 300 mg by mouth 2 times a day., Disp: , Rfl:   ALLERGIES:   Allergies   Allergen Reactions   • Morphine Swelling      Redness and itching   • Morphine Sulfate [Morphine]      unknown     SURGHX:   Past Surgical History:   Procedure Laterality Date   • SPINAL CORD STIMULATOR  2/19/2015    Performed by Ghassan Coy M.D. at SURGERY [facility]S   • PB PERCUT IMPLNT NEUROELECT,EPIDURAL  2/4/2015    Performed by Ghassan Coy M.D. at [facility] ORS   • PB PERCUT IMPLNT NEUROELECT,EPIDURAL  2/4/2015    Performed by Ghassan Coy M.D. at [facility] ORS   • PB INJECT NERV BLCK,STELLATE GANGLION  12/10/2014    Performed by Ghassan Coy M.D. at [facility] ORS   • PB INJECT NERV BLCK,STELLATE GANGLION  12/3/2014    Performed by Ghassan Coy M.D. at [facility] ORS   • PB INJECT NERV BLCK,STELLATE GANGLION  11/26/2014    Performed by Ghassan Coy M.D. at [facility] ORS   • GASTROSCOPY WITH BIOPSY  6/22/2013    Performed by Robbie Nesbitt M.D. at ENDOSCOPY [facility] ORS   • OTHER ORTHOPEDIC SURGERY      L tib/fib w/hardware-removed     SOCHX:  reports that he has been smoking.  He has been smoking about 0.50 packs per day. He has never used smokeless tobacco. He reports that he drinks alcohol. He reports that he does not use drugs.  FH: family history is not on file.    Assessment/Plan:     1. Acute intractable headache, unspecified headache type  - ketorolac (TORADOL) injection 60 mg; 2 mL by Intramuscular route Once.   - Given in clinic without any significant relief of headache  - CT-HEAD W/O; Future    2. Acute midline low back pain without sciatica  - DX-LUMBAR SPINE-2 OR 3 VIEWS; Future  Impression       Minimal degenerative disc disease in the upper lumbar spine       STAT CT-head ordered for patient at today's visit, patient left urgent care and decided to go to the ED instead.

## 2018-04-18 NOTE — ED NOTES
Pt assessed, c/o sharp headache that moves for past few weeks, became much worst today. Pt has had intermittent N/V. Pt reports that when the headache sharp pains occur, he has sharp low back pain. Denies visual changes and photosensitivity. Denies hx Migraines. Seen at  today and was given Toradol which did not help. Will cont to monitor.

## 2018-04-18 NOTE — DISCHARGE INSTRUCTIONS
Cluster Headache  Cluster headaches are deeply painful. They normally occur on one side of your head, but they may switch sides. Often, cluster headaches:  · Are severe.  · Happen often for a few weeks or months and then go away for a while.  · Last from 15 minutes to 3 hours.  · Happen at the same time each day.  · Happen at night.  · Happen many times a day.  Follow these instructions at home:  Follow instructions from your doctor to care for yourself at home:  · Go to bed at the same time each night. Get the same amount of sleep every night.  · Avoid alcohol.  · Stop smoking if you smoke. This includes cigarettes and e-cigarettes.  · Take over-the-counter and prescription medicines only as told by your doctor.  · Do not drive or use heavy machinery while taking prescription pain medicine.  · Use oxygen as told by your doctor.  · Exercise regularly.  · Eat a healthy diet.  · Write down when each headache happened, what kind of pain you had, how bad your pain was, and what you tried to help your pain. This is called a headache diary. Use it as told by your doctor.  Contact a doctor if:  · Your headaches get worse or they happen more often.  · Your medicines are not helping.  Get help right away if:  · You pass out (faint).  · You get weak or lose feeling (have numbness) on one side of your body or face.  · You see two of everything (double vision).  · You feel sick to your stomach (nauseous) or you throw up (vomit), and you do not stop after many hours.  · You have trouble with your balance or with walking.  · You have trouble talking.  · You have neck pain or stiffness.  · You have a fever.  This information is not intended to replace advice given to you by your health care provider. Make sure you discuss any questions you have with your health care provider.  Document Released: 01/25/2006 Document Revised: 08/25/2017 Document Reviewed: 08/25/2017  Elsevier Interactive Patient Education © 2017 [employer]

## 2018-04-18 NOTE — ED PROVIDER NOTES
ED Provider Note    Means of arrival: private vehicle  History obtained from: patient  History limited by: none    CHIEF COMPLAINT  Chief Complaint   Patient presents with   • Headache       HPI  Jarod Fields is a 43 y.o. male who presents to the Emergency Department for headache. Patient reports that for the past 2 weeks he has had a persistent headache. He describes the headache as frontal in location, pressure-like in nature, and moderate in severity. Patient reports that the pain waxes and wanes in severity. It was improved slightly with Motrin and Imitrex at home. He has a prior history of headaches, but reports he has not had one that has lasted this long. He has had some mild nausea, vomiting, and photophobia. He denies fevers, chills, blurred vision, neck pain, back pain, chest pain, and shortness of breath. There is no family history of aneurysms or intracranial tumors.    REVIEW OF SYSTEMS  Review of Systems   Constitutional: Negative for chills and fever.   HENT: Negative for sore throat.    Eyes: Positive for photophobia. Negative for blurred vision and pain.   Respiratory: Negative for shortness of breath.    Cardiovascular: Negative for chest pain.   Gastrointestinal: Positive for nausea and vomiting. Negative for abdominal pain and diarrhea.   Genitourinary: Negative for dysuria.   Musculoskeletal: Negative for back pain and neck pain.   Skin: Negative for itching and rash.   Neurological: Positive for headaches. Negative for dizziness.       PAST MEDICAL HISTORY   has a past medical history of Pain (2/18/15).    SURGICAL HISTORY   has a past surgical history that includes inject nerv blck,stellate ganglion (11/26/2014); inject nerv blck,stellate ganglion (12/3/2014); inject nerv blck,stellate ganglion (12/10/2014); percut implnt neuroelect,epidural (2/4/2015); percut implnt neuroelect,epidural (2/4/2015); spinal cord stimulator (2/19/2015); gastroscopy with biopsy (6/22/2013); and other  "orthopedic surgery.    SOCIAL HISTORY  Social History   Substance Use Topics   • Smoking status: Current Every Day Smoker     Packs/day: 0.50   • Smokeless tobacco: Never Used      Comment: 1/2 ppd since 15yo   • Alcohol use Yes      Comment: weekly      History   Drug Use No     Comment: denies        FAMILY HISTORY  Family History   Problem Relation Age of Onset   • Hypertension     • Cancer         CURRENT MEDICATIONS  Home Medications     Reviewed by Raymond Wilson R.N. (Registered Nurse) on 04/17/18 at 1724  Med List Status: Complete   Medication Last Dose Status   ibuprofen (MOTRIN) 600 MG Tab  Active   Lido-Capsaicin-Men-Methyl Sal (MEDI-PATCH-LIDOCAINE EX)  Active   oxycodone-acetaminophen (PERCOCET-10)  MG Tab  Active   pregabalin (LYRICA) 300 MG capsule 11/29/2016 Active                ALLERGIES  Allergies   Allergen Reactions   • Morphine Swelling     Redness and itching   • Morphine Sulfate [Morphine]      unknown       PHYSICAL EXAM  VITAL SIGNS: /103   Pulse 76   Temp 36.4 °C (97.6 °F)   Resp 20   Ht 1.778 m (5' 10\")   Wt 101.2 kg (223 lb 1.7 oz)   SpO2 95%   BMI 32.01 kg/m²   Vitals reviewed by myself.  Physical Exam  Nursing note and vitals reviewed.  Constitutional: Well-developed and well-nourished. No distress.   HENT: Head is normocephalic and atraumatic. Oropharynx is clear and moist without exudate or erythema.   Eyes: Pupils are equal, round, and reactive to light. No horizontal or vertical nystagmus. Conjunctiva are normal.   Cardiovascular: Normal rate and regular rhythm. No murmur heard. Normal radial pulses.  Pulmonary/Chest: Breath sounds normal. No wheezes or rales.   Abdominal: Soft and non-tender. No distention.    Musculoskeletal: Extremities exhibit normal range of motion without edema or tenderness. Patient ambulates with a normal narrow-based steady gait.   Neurological: Awake, alert and oriented to person, place, and time. No focal deficits noted. Cranial nerves " II - XII intact. No pronator drift.  No dysmetria on cerebellar testing. Normal speech and language. Normal strength and sensation in bilateral upper and lower extremities.   Skin: Skin is warm and dry. No rash.   Psychiatric: Normal mood and affect. Appropriate for clinical situation.      DIAGNOSTIC STUDIES /  LABS  Labs Reviewed   CBC WITH DIFFERENTIAL - Abnormal; Notable for the following:        Result Value    MCH 34.0 (*)     MCHC 37.1 (*)     MPV 8.9 (*)     All other components within normal limits   BASIC METABOLIC PANEL   ESTIMATED GFR      All labs reviewed by me.    RADIOLOGY  CT-CTA HEAD WITH & W/O-POST PROCESS   Final Result      1.  No intracranial aneurysm, focal stenosis, or abrupt large vessel cut off.   2.  No intracranial hemorrhage or focal edema.   3.  Asymmetric lateral ventricles, unchanged from prior exam.        The radiologist's interpretation of all radiological studies have been reviewed by me.      COURSE & MEDICAL DECISION MAKING  Nursing notes, VS, PMSFHx reviewed in chart.    Patient is a 43-year-old male who comes in for headache. Differential diagnosis includes migraine headache, tension headache, intracranial tumor, aneurysm. As patient reports that his headache is different from prior headaches with increased severity and length of time I will get imaging of his head including a CT without contrast and CTA of the blood vessels to assess for possible aneurysm or large tumor.    Patient's initial vitals are within normal limits except for slight hypertension, likely secondary to pain. He is otherwise neurologically intact and exam. Patient is treated with IV fluids, Reglan, Benadryl, Decadron, Toradol, and Tylenol. Imaging returns demonstrates no acute abnormalities. Upon reassessment patient's headache has completely resolved. Therefore he is reassured, advised to follow up with PCP for any recurrent headaches, provided with a prescription for Reglan for any persistent nausea,  and given strict return precautions. Patient is then discharged home in stable condition with vitals normal limits.      FINAL IMPRESSION  1. Acute nonintractable headache, unspecified headache type

## 2018-04-18 NOTE — ED NOTES
"Pt bib family with c/o headache for the past 2 weeks. Pt states it's intermittent in intensity. He was seen at  earlier today where a head CT was ordered. Pt states \"I decided just to come here cause I know you guys can do a ct and take care of the headache.\"   "

## 2018-06-08 ENCOUNTER — TELEPHONE (OUTPATIENT)
Dept: MEDICAL GROUP | Facility: MEDICAL CENTER | Age: 43
End: 2018-06-08

## 2019-05-13 ENCOUNTER — HOSPITAL ENCOUNTER (OUTPATIENT)
Dept: RADIOLOGY | Facility: REHABILITATION | Age: 44
End: 2019-05-13
Attending: ANESTHESIOLOGY

## 2019-05-13 ENCOUNTER — HOSPITAL ENCOUNTER (OUTPATIENT)
Dept: PAIN MANAGEMENT | Facility: REHABILITATION | Age: 44
End: 2019-05-13
Attending: ANESTHESIOLOGY
Payer: COMMERCIAL

## 2019-05-13 VITALS
OXYGEN SATURATION: 95 % | WEIGHT: 211.2 LBS | TEMPERATURE: 98.2 F | RESPIRATION RATE: 18 BRPM | SYSTOLIC BLOOD PRESSURE: 142 MMHG | HEART RATE: 60 BPM | DIASTOLIC BLOOD PRESSURE: 100 MMHG | HEIGHT: 70 IN | BODY MASS INDEX: 30.24 KG/M2

## 2019-05-13 PROCEDURE — 700117 HCHG RX CONTRAST REV CODE 255

## 2019-05-13 PROCEDURE — 700111 HCHG RX REV CODE 636 W/ 250 OVERRIDE (IP)

## 2019-05-13 PROCEDURE — 99152 MOD SED SAME PHYS/QHP 5/>YRS: CPT

## 2019-05-13 PROCEDURE — 64510 N BLOCK STELLATE GANGLION: CPT

## 2019-05-13 RX ORDER — MIDAZOLAM HYDROCHLORIDE 1 MG/ML
INJECTION INTRAMUSCULAR; INTRAVENOUS
Status: COMPLETED
Start: 2019-05-13 | End: 2019-05-13

## 2019-05-13 RX ORDER — GABAPENTIN 300 MG/1
300 CAPSULE ORAL 3 TIMES DAILY
COMMUNITY
End: 2019-08-12

## 2019-05-13 RX ORDER — BUPIVACAINE HYDROCHLORIDE 2.5 MG/ML
INJECTION, SOLUTION EPIDURAL; INFILTRATION; INTRACAUDAL
Status: COMPLETED
Start: 2019-05-13 | End: 2019-05-13

## 2019-05-13 RX ADMIN — IOHEXOL 2 ML: 240 INJECTION, SOLUTION INTRATHECAL; INTRAVASCULAR; INTRAVENOUS; ORAL at 08:21

## 2019-05-13 RX ADMIN — FENTANYL CITRATE 100 MCG: 50 INJECTION, SOLUTION INTRAMUSCULAR; INTRAVENOUS at 08:11

## 2019-05-13 RX ADMIN — MIDAZOLAM HYDROCHLORIDE 2 MG: 1 INJECTION, SOLUTION INTRAMUSCULAR; INTRAVENOUS at 08:12

## 2019-05-13 RX ADMIN — BUPIVACAINE HYDROCHLORIDE 10 ML: 2.5 INJECTION, SOLUTION EPIDURAL; INFILTRATION; INTRACAUDAL; PERINEURAL at 08:23

## 2019-05-13 NOTE — NON-PROVIDER
Pt given verbal and written d/c instructions and verbalizes understanding. Took Motrin 5/12/2019, denies blood thinners use in last 5 days, denies current infection or abx use. Med rec complete. Pt has post procedural ride home with wife Denisse Fields.

## 2019-05-13 NOTE — NON-PROVIDER
Pt position supine by RN and CST.  Arms resting on table.  Pillow placed under knees by RN.Time out done, included Procedure, Allergies, Stop Bang score, and pt history.

## 2019-08-12 ENCOUNTER — APPOINTMENT (OUTPATIENT)
Dept: RADIOLOGY | Facility: MEDICAL CENTER | Age: 44
End: 2019-08-12
Attending: EMERGENCY MEDICINE
Payer: COMMERCIAL

## 2019-08-12 ENCOUNTER — HOSPITAL ENCOUNTER (EMERGENCY)
Facility: MEDICAL CENTER | Age: 44
End: 2019-08-13
Attending: EMERGENCY MEDICINE
Payer: COMMERCIAL

## 2019-08-12 DIAGNOSIS — R10.11 RIGHT UPPER QUADRANT ABDOMINAL PAIN: ICD-10-CM

## 2019-08-12 LAB
ALBUMIN SERPL BCP-MCNC: 4.4 G/DL (ref 3.2–4.9)
ALBUMIN/GLOB SERPL: 1.8 G/DL
ALP SERPL-CCNC: 66 U/L (ref 30–99)
ALT SERPL-CCNC: 59 U/L (ref 2–50)
ANION GAP SERPL CALC-SCNC: 7 MMOL/L (ref 0–11.9)
APPEARANCE UR: CLEAR
AST SERPL-CCNC: 44 U/L (ref 12–45)
BASOPHILS # BLD AUTO: 0.4 % (ref 0–1.8)
BASOPHILS # BLD: 0.02 K/UL (ref 0–0.12)
BILIRUB SERPL-MCNC: 0.7 MG/DL (ref 0.1–1.5)
BILIRUB UR QL STRIP.AUTO: NEGATIVE
BUN SERPL-MCNC: 16 MG/DL (ref 8–22)
CALCIUM SERPL-MCNC: 8.9 MG/DL (ref 8.4–10.2)
CHLORIDE SERPL-SCNC: 108 MMOL/L (ref 96–112)
CO2 SERPL-SCNC: 24 MMOL/L (ref 20–33)
COLOR UR: YELLOW
CREAT SERPL-MCNC: 0.84 MG/DL (ref 0.5–1.4)
EOSINOPHIL # BLD AUTO: 0.09 K/UL (ref 0–0.51)
EOSINOPHIL NFR BLD: 1.9 % (ref 0–6.9)
ERYTHROCYTE [DISTWIDTH] IN BLOOD BY AUTOMATED COUNT: 40.7 FL (ref 35.9–50)
GLOBULIN SER CALC-MCNC: 2.5 G/DL (ref 1.9–3.5)
GLUCOSE SERPL-MCNC: 90 MG/DL (ref 65–99)
GLUCOSE UR STRIP.AUTO-MCNC: NEGATIVE MG/DL
HCT VFR BLD AUTO: 45.1 % (ref 42–52)
HGB BLD-MCNC: 16.2 G/DL (ref 14–18)
IMM GRANULOCYTES # BLD AUTO: 0.01 K/UL (ref 0–0.11)
IMM GRANULOCYTES NFR BLD AUTO: 0.2 % (ref 0–0.9)
KETONES UR STRIP.AUTO-MCNC: NEGATIVE MG/DL
LEUKOCYTE ESTERASE UR QL STRIP.AUTO: NEGATIVE
LIPASE SERPL-CCNC: 29 U/L (ref 7–58)
LYMPHOCYTES # BLD AUTO: 1.8 K/UL (ref 1–4.8)
LYMPHOCYTES NFR BLD: 38.8 % (ref 22–41)
MCH RBC QN AUTO: 33.8 PG (ref 27–33)
MCHC RBC AUTO-ENTMCNC: 35.9 G/DL (ref 33.7–35.3)
MCV RBC AUTO: 94.2 FL (ref 81.4–97.8)
MICRO URNS: NORMAL
MONOCYTES # BLD AUTO: 0.39 K/UL (ref 0–0.85)
MONOCYTES NFR BLD AUTO: 8.4 % (ref 0–13.4)
NEUTROPHILS # BLD AUTO: 2.33 K/UL (ref 1.82–7.42)
NEUTROPHILS NFR BLD: 50.3 % (ref 44–72)
NITRITE UR QL STRIP.AUTO: NEGATIVE
NRBC # BLD AUTO: 0 K/UL
NRBC BLD-RTO: 0 /100 WBC
PH UR STRIP.AUTO: 5.5 [PH] (ref 5–8)
PLATELET # BLD AUTO: 185 K/UL (ref 164–446)
PMV BLD AUTO: 9.1 FL (ref 9–12.9)
POTASSIUM SERPL-SCNC: 3.7 MMOL/L (ref 3.6–5.5)
PROT SERPL-MCNC: 6.9 G/DL (ref 6–8.2)
PROT UR QL STRIP: NEGATIVE MG/DL
RBC # BLD AUTO: 4.79 M/UL (ref 4.7–6.1)
RBC UR QL AUTO: NEGATIVE
SODIUM SERPL-SCNC: 139 MMOL/L (ref 135–145)
SP GR UR STRIP.AUTO: 1.02
WBC # BLD AUTO: 4.6 K/UL (ref 4.8–10.8)

## 2019-08-12 PROCEDURE — 99285 EMERGENCY DEPT VISIT HI MDM: CPT

## 2019-08-12 PROCEDURE — 36415 COLL VENOUS BLD VENIPUNCTURE: CPT

## 2019-08-12 PROCEDURE — 85025 COMPLETE CBC W/AUTO DIFF WBC: CPT

## 2019-08-12 PROCEDURE — 76705 ECHO EXAM OF ABDOMEN: CPT

## 2019-08-12 PROCEDURE — 81003 URINALYSIS AUTO W/O SCOPE: CPT

## 2019-08-12 PROCEDURE — 96374 THER/PROPH/DIAG INJ IV PUSH: CPT

## 2019-08-12 PROCEDURE — 700102 HCHG RX REV CODE 250 W/ 637 OVERRIDE(OP): Performed by: EMERGENCY MEDICINE

## 2019-08-12 PROCEDURE — A9270 NON-COVERED ITEM OR SERVICE: HCPCS | Performed by: EMERGENCY MEDICINE

## 2019-08-12 PROCEDURE — 700111 HCHG RX REV CODE 636 W/ 250 OVERRIDE (IP)

## 2019-08-12 PROCEDURE — 80053 COMPREHEN METABOLIC PANEL: CPT

## 2019-08-12 PROCEDURE — 83690 ASSAY OF LIPASE: CPT

## 2019-08-12 RX ORDER — LORAZEPAM 2 MG/ML
2 INJECTION INTRAMUSCULAR ONCE
Status: DISCONTINUED | OUTPATIENT
Start: 2019-08-12 | End: 2019-08-12

## 2019-08-12 RX ORDER — ACETAMINOPHEN 500 MG
1000 TABLET ORAL ONCE
Status: COMPLETED | OUTPATIENT
Start: 2019-08-13 | End: 2019-08-12

## 2019-08-12 RX ORDER — KETOROLAC TROMETHAMINE 30 MG/ML
30 INJECTION, SOLUTION INTRAMUSCULAR; INTRAVENOUS ONCE
Status: COMPLETED | OUTPATIENT
Start: 2019-08-12 | End: 2019-08-12

## 2019-08-12 RX ORDER — SODIUM CHLORIDE 9 MG/ML
1000 INJECTION, SOLUTION INTRAVENOUS ONCE
Status: DISCONTINUED | OUTPATIENT
Start: 2019-08-12 | End: 2019-08-12

## 2019-08-12 RX ORDER — KETOROLAC TROMETHAMINE 30 MG/ML
INJECTION, SOLUTION INTRAMUSCULAR; INTRAVENOUS
Status: COMPLETED
Start: 2019-08-12 | End: 2019-08-12

## 2019-08-12 RX ADMIN — ACETAMINOPHEN 1000 MG: 500 TABLET, FILM COATED ORAL at 23:34

## 2019-08-12 RX ADMIN — KETOROLAC TROMETHAMINE 30 MG: 30 INJECTION, SOLUTION INTRAMUSCULAR at 22:04

## 2019-08-12 RX ADMIN — KETOROLAC TROMETHAMINE 30 MG: 30 INJECTION, SOLUTION INTRAMUSCULAR; INTRAVENOUS at 22:04

## 2019-08-13 ENCOUNTER — APPOINTMENT (OUTPATIENT)
Dept: RADIOLOGY | Facility: MEDICAL CENTER | Age: 44
End: 2019-08-13
Attending: EMERGENCY MEDICINE
Payer: COMMERCIAL

## 2019-08-13 VITALS
WEIGHT: 212.08 LBS | HEART RATE: 60 BPM | DIASTOLIC BLOOD PRESSURE: 87 MMHG | TEMPERATURE: 98.4 F | OXYGEN SATURATION: 98 % | SYSTOLIC BLOOD PRESSURE: 142 MMHG | RESPIRATION RATE: 18 BRPM | HEIGHT: 70 IN | BODY MASS INDEX: 30.36 KG/M2

## 2019-08-13 PROCEDURE — 700102 HCHG RX REV CODE 250 W/ 637 OVERRIDE(OP): Performed by: EMERGENCY MEDICINE

## 2019-08-13 PROCEDURE — 700111 HCHG RX REV CODE 636 W/ 250 OVERRIDE (IP): Performed by: EMERGENCY MEDICINE

## 2019-08-13 PROCEDURE — 700117 HCHG RX CONTRAST REV CODE 255: Performed by: EMERGENCY MEDICINE

## 2019-08-13 PROCEDURE — 96375 TX/PRO/DX INJ NEW DRUG ADDON: CPT

## 2019-08-13 PROCEDURE — A9270 NON-COVERED ITEM OR SERVICE: HCPCS | Performed by: EMERGENCY MEDICINE

## 2019-08-13 PROCEDURE — 74177 CT ABD & PELVIS W/CONTRAST: CPT

## 2019-08-13 PROCEDURE — 71275 CT ANGIOGRAPHY CHEST: CPT

## 2019-08-13 PROCEDURE — 96376 TX/PRO/DX INJ SAME DRUG ADON: CPT

## 2019-08-13 RX ORDER — HYDROMORPHONE HYDROCHLORIDE 1 MG/ML
1 INJECTION, SOLUTION INTRAMUSCULAR; INTRAVENOUS; SUBCUTANEOUS ONCE
Status: COMPLETED | OUTPATIENT
Start: 2019-08-13 | End: 2019-08-13

## 2019-08-13 RX ORDER — OMEPRAZOLE 40 MG/1
40 CAPSULE, DELAYED RELEASE ORAL DAILY
Qty: 30 CAP | Refills: 0 | Status: SHIPPED | OUTPATIENT
Start: 2019-08-13 | End: 2020-11-30

## 2019-08-13 RX ADMIN — LIDOCAINE HYDROCHLORIDE 30 ML: 20 SOLUTION OROPHARYNGEAL at 00:25

## 2019-08-13 RX ADMIN — HYDROMORPHONE HYDROCHLORIDE 1 MG: 1 INJECTION, SOLUTION INTRAMUSCULAR; INTRAVENOUS; SUBCUTANEOUS at 01:23

## 2019-08-13 RX ADMIN — HYDROMORPHONE HYDROCHLORIDE 1 MG: 1 INJECTION, SOLUTION INTRAMUSCULAR; INTRAVENOUS; SUBCUTANEOUS at 00:35

## 2019-08-13 RX ADMIN — IOHEXOL 100 ML: 350 INJECTION, SOLUTION INTRAVENOUS at 01:03

## 2019-08-13 NOTE — ED TRIAGE NOTES
"Chief Complaint   Patient presents with   • Abdominal Pain     pt c/o acute onset RUQ cramping pain at 1500 today that has increased, especially after eating and has moved to right lower back tonight     /109   Pulse 72   Temp 37.1 °C (98.7 °F) (Temporal)   Resp 18   Ht 1.778 m (5' 10\")   Wt 96.2 kg (212 lb 1.3 oz)   SpO2 96%   BMI 30.43 kg/m²     Pt ambulates to triage with stable gait, alert and oriented, regular unlabored respirations. Triage process explained to pt.      "

## 2019-08-13 NOTE — ED PROVIDER NOTES
ED Provider Note    ED Provider Note    Primary care provider: Pcp Pt States None  Means of arrival: Walk-in  History obtained from: Patient    CHIEF COMPLAINT  Chief Complaint   Patient presents with   • Abdominal Pain     pt c/o acute onset RUQ cramping pain at 1500 today that has increased, especially after eating and has moved to right lower back tonight     Seen at 9:36 PM.   HPI  Jarod Fields is a 44 y.o. male who presents to the Emergency Department abrupt onset of moderate to severe stabbing right-sided abdominal pain that has been radiated to the back.  The pain began around 3 PM today.  It is improved with leaning forward.  It is worsened with direct palpation.  The patient initially thought it may be due to hunger type pains, after this he ate some noodles and the pain intensified.  He denies any other associated symptoms.  He was in his usual state of health prior to the onset of the pain.  He denies any fevers, chills, chest pain, shortness of breath, nausea, vomiting, numbness, weakness, diarrhea, bleeding diathesis or impaired immunity.  He does note a episode several years ago that was similar to this.    He states that he was admitted for several days and then ran multiple tests and did not find the etiology of the patient's pain.  They eventually told him it was a pulled muscle.  He has never had any history of gallstones.      REVIEW OF SYSTEMS  See HPI,   Remainder of ROS negative.     PAST MEDICAL HISTORY   has a past medical history of Pain (2/18/15).    SURGICAL HISTORY   has a past surgical history that includes inject nerv blck,stellate ganglion (11/26/2014); inject nerv blck,stellate ganglion (12/3/2014); inject nerv blck,stellate ganglion (12/10/2014); percut implnt neuroelect,epidural (2/4/2015); percut implnt neuroelect,epidural (2/4/2015); spinal cord stimulator (2/19/2015); gastroscopy with biopsy (6/22/2013); and other orthopedic surgery.    SOCIAL HISTORY  Social History  "    Tobacco Use   • Smoking status: Current Every Day Smoker     Packs/day: 0.50   • Smokeless tobacco: Never Used   • Tobacco comment: 1/2 ppd since 13yo   Substance Use Topics   • Alcohol use: Yes     Comment: weekly   • Drug use: No     Comment: denies       Social History     Substance and Sexual Activity   Drug Use No    Comment: denies        FAMILY HISTORY  Family History   Problem Relation Age of Onset   • Hypertension Other    • Cancer Other        CURRENT MEDICATIONS  Reviewed.  See Encounter Summary.     ALLERGIES  Allergies   Allergen Reactions   • Morphine Swelling     Redness and itching   • Morphine Sulfate [Morphine]      unknown       PHYSICAL EXAM  VITAL SIGNS: /87   Pulse 60   Temp 36.9 °C (98.4 °F) (Temporal)   Resp 18   Ht 1.778 m (5' 10\")   Wt 96.2 kg (212 lb 1.3 oz)   SpO2 98%   BMI 30.43 kg/m²   Constitutional: Awake, alert appears to be in some significant discomfort.  Leaning forward..  HENT: Normocephalic, Bilateral external ears normal. Nose normal.   Eyes: Conjunctiva normal, non-icteric, EOMI.    Thorax & Lungs: Easy unlabored respirations, Clear to ascultation bilaterally.  Cardiovascular: Regular rate, Regular rhythm, No murmurs, rubs or gallops.  Abdomen:  Soft, moderate right upper quadrant tenderness with light palpation.  Nondistended, normal active bowel sounds.   :    Skin: Visualized skin is  Dry, No erythema, No rash.   Musculoskeletal:   No cyanosis, clubbing or edema.  Neurologic: Alert, Grossly non-focal.   Psychiatric: Normal affect, Normal mood  Lymphatic:  No cervical LAD      RADIOLOGY  CT-ABDOMEN-PELVIS WITH   Final Result         1.  No acute abnormality.   2.  Small fat-containing left inguinal hernia      CT-CTA CHEST PULMONARY ARTERY W/ RECONS   Final Result         1.  No pulmonary embolus appreciated.      US-RUQ   Final Result         1.  Hepatomegaly and echogenic liver, compatible with fatty change versus fibrosis.   2.  Mildly complex right " renal cyst, recommend sonographic follow-up in 6 months for evaluation of stability.            COURSE & MEDICAL DECISION MAKING  Pertinent Labs & Imaging studies reviewed. (See chart for details)    Differential diagnoses include but are not limited to: Biliary colic, cholecystitis, cholelithiasis, bilateral lobe pneumonia, pulmonary embolus, less likely dissection.    9:36 PM - patient seen and examined at bedside.    12:21 AM-I reviewed the images, I do not see any evidence of acute cholecystitis, the patient does not have any gallstones, the CBD and wall appear normal.  He is still in the ER holding his right upper quadrant and complaining of pain.  I will attempt to deviate this the GI cocktail.  CT has been ordered as well.    12:26 AM-upon review the medical record the patient was admitted in 2013 for similar complaints.  He underwent a HIDA scan, CT and right upper quadrant ultrasound were all unremarkable.  He had a EGD that showed some mild gastritis.  The patient was discharged on Prilosec.  I also reviewed the prescription monitoring, the patient received 180 oxycodone 10/3/2025's every month.    12:40 AM-patient is still writhing in pain after 1 mg of Dilaudid.  He states that the pain is severe and he cannot take a deep breath.  I have ordered a CTA in addition to his abdomen and pelvis.  The patient does have some significant opioid dependence and a history of CRPS which may be confounding the presentation today.    1:04 AM-read CT by me is unremarkable.  The patient does not have any pleural effusions, there is no pneumothorax, there is no pulmonary embolus.  The patient does have a right renal cyst, I compared the images 2016 this is unchanged.  He does not have any hydronephrosis or sign of nephrolithiasis.  Again the gallbladder and liver are unremarkable.  No aneurysm, no sign of aortic dissection.    Decision Making:  This is a 44 y.o. year old male who presents with acute onset of severe RUQ  abdominal pain. Differential as above. Initially he was treated for biliary colic vs gastritis. RUQ US WNL. Patient continued to have severe pain and pleurisy, so the workup was escalated to include chest/ab/pelvis CT- also unremarkable. I did an extensive chart review on this patient. He does not frequent the ER regularly, but when here requires a significant amount of opoids for pain control, in keeping with opiate dependence. He is on 10mg x180 oxycodone monthly for CRPS. He was admitted in 2013 after several ED visits for RUQ abdominal pain. HIDA, US, CT, EGD all essentially unremarkable at that time.   I see no emergent process today. I suspect some gastritis/GERD/PUD which is exacerbated by hypesthesia related to chronic opoid use. I will restart his PPI and recommend he follow up with GI.     Discharge Medications:  Discharge Medication List as of 8/13/2019  1:27 AM      START taking these medications    Details   omeprazole (PRILOSEC) 40 MG delayed-release capsule Take 1 Cap by mouth every day., Disp-30 Cap, R-0, Normal             The patient was discharged home (see d/c instructions) and parent was told to return immediately for any signs or symptoms listed, or any worsening at all.  The patient's parent verbally agreed to the discharge precautions and follow-up plan which is documented in EPIC.    The patient's blood pressure is elevated today. >120/80. I have referred them to primary care for follow up.       FINAL IMPRESSION  1. Right upper quadrant abdominal pain

## 2019-10-07 ENCOUNTER — OFFICE VISIT (OUTPATIENT)
Dept: URGENT CARE | Facility: PHYSICIAN GROUP | Age: 44
End: 2019-10-07
Payer: COMMERCIAL

## 2019-10-07 VITALS
RESPIRATION RATE: 16 BRPM | HEIGHT: 70 IN | WEIGHT: 203 LBS | HEART RATE: 74 BPM | TEMPERATURE: 98.6 F | SYSTOLIC BLOOD PRESSURE: 118 MMHG | OXYGEN SATURATION: 98 % | BODY MASS INDEX: 29.06 KG/M2 | DIASTOLIC BLOOD PRESSURE: 70 MMHG

## 2019-10-07 DIAGNOSIS — S39.012A ACUTE MYOFASCIAL STRAIN OF LUMBAR REGION, INITIAL ENCOUNTER: ICD-10-CM

## 2019-10-07 PROCEDURE — 99214 OFFICE O/P EST MOD 30 MIN: CPT | Performed by: INTERNAL MEDICINE

## 2019-10-07 RX ORDER — METHOCARBAMOL 750 MG/1
750 TABLET, FILM COATED ORAL 4 TIMES DAILY
Qty: 40 TAB | Refills: 0 | Status: SHIPPED | OUTPATIENT
Start: 2019-10-07 | End: 2020-11-30

## 2019-10-07 RX ORDER — KETOROLAC TROMETHAMINE 30 MG/ML
60 INJECTION, SOLUTION INTRAMUSCULAR; INTRAVENOUS ONCE
Status: COMPLETED | OUTPATIENT
Start: 2019-10-07 | End: 2019-10-07

## 2019-10-07 RX ADMIN — KETOROLAC TROMETHAMINE 60 MG: 30 INJECTION, SOLUTION INTRAMUSCULAR; INTRAVENOUS at 14:53

## 2019-10-07 ASSESSMENT — ENCOUNTER SYMPTOMS
NUMBNESS: 0
BOWEL INCONTINENCE: 0
LEG PAIN: 0
ABDOMINAL PAIN: 0
BACK PAIN: 1
FEVER: 0
WEAKNESS: 0
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
## 2019-10-27 ENCOUNTER — APPOINTMENT (OUTPATIENT)
Dept: RADIOLOGY | Facility: MEDICAL CENTER | Age: 44
End: 2019-10-27
Attending: EMERGENCY MEDICINE
Payer: COMMERCIAL

## 2019-10-27 ENCOUNTER — HOSPITAL ENCOUNTER (EMERGENCY)
Facility: MEDICAL CENTER | Age: 44
End: 2019-10-27
Attending: EMERGENCY MEDICINE
Payer: COMMERCIAL

## 2019-10-27 VITALS
OXYGEN SATURATION: 97 % | BODY MASS INDEX: 29.67 KG/M2 | HEIGHT: 70 IN | DIASTOLIC BLOOD PRESSURE: 98 MMHG | WEIGHT: 207.23 LBS | RESPIRATION RATE: 16 BRPM | HEART RATE: 69 BPM | TEMPERATURE: 97.8 F | SYSTOLIC BLOOD PRESSURE: 138 MMHG

## 2019-10-27 DIAGNOSIS — M79.622 LEFT UPPER ARM PAIN: ICD-10-CM

## 2019-10-27 PROCEDURE — 93971 EXTREMITY STUDY: CPT | Mod: LT

## 2019-10-27 PROCEDURE — 700111 HCHG RX REV CODE 636 W/ 250 OVERRIDE (IP): Performed by: EMERGENCY MEDICINE

## 2019-10-27 PROCEDURE — 99284 EMERGENCY DEPT VISIT MOD MDM: CPT

## 2019-10-27 PROCEDURE — 96372 THER/PROPH/DIAG INJ SC/IM: CPT

## 2019-10-27 RX ORDER — KETOROLAC TROMETHAMINE 30 MG/ML
15 INJECTION, SOLUTION INTRAMUSCULAR; INTRAVENOUS ONCE
Status: COMPLETED | OUTPATIENT
Start: 2019-10-27 | End: 2019-10-27

## 2019-10-27 RX ADMIN — KETOROLAC TROMETHAMINE 15 MG: 30 INJECTION, SOLUTION INTRAMUSCULAR at 13:35

## 2019-10-27 ASSESSMENT — PAIN DESCRIPTION - DESCRIPTORS: DESCRIPTORS: ACHING

## 2019-10-27 NOTE — ED TRIAGE NOTES
"Presents complaining of acute onset of atraumatic, \"deep\" neuropathic pain affecting his left arm, and recurring for the past 2 days.   Chief Complaint   Patient presents with   • Arm Pain     /98   Pulse 77   Temp 36.9 °C (98.5 °F) (Temporal)   Resp 18   Ht 1.778 m (5' 10\")   Wt 94 kg (207 lb 3.7 oz)   SpO2 99%   BMI 29.73 kg/m²     "

## 2019-10-27 NOTE — ED NOTES
"Pt denies the need for pain medication at this time, \"no I want to wait and see if we can figure it out first\" warm blanket provided, lights dimmed.   "

## 2019-10-27 NOTE — ED PROVIDER NOTES
ED Provider Note    CHIEF COMPLAINT  Chief Complaint   Patient presents with   • Arm Pain       HPI  Jarod Fields is a 44 y.o. male who presents with left upper extremity pain to the deep aspect of the brachial region.  The patient does have a history of complex regional pain syndrome for which she has a spinal nerve stimulator in the thoracic region.  He states that he typically has pain down the right arm for which he uses the spinal nerve stimulator.  He states that he has the capability to change the programming of this stimulator however it has not helped with his pain symptoms.  He states that moving helps with the pain.  No obvious swelling to the hand or forearm.  No erythema.  No trauma.  The patient is right-hand dominant.    No chest pain or trouble breathing.  No prior history of DVT or PE.    REVIEW OF SYSTEMS  See HPI for further details. All other systems are negative.     PAST MEDICAL HISTORY   has a past medical history of Pain (2/18/15).    SOCIAL HISTORY  Social History     Tobacco Use   • Smoking status: Current Every Day Smoker     Packs/day: 0.50   • Smokeless tobacco: Never Used   • Tobacco comment: 1/2 ppd since 15yo, on chantix   Substance and Sexual Activity   • Alcohol use: Yes     Comment: weekly   • Drug use: No     Comment: denies    • Sexual activity: Not on file       SURGICAL HISTORY   has a past surgical history that includes inject nerv blck,stellate ganglion (11/26/2014); inject nerv blck,stellate ganglion (12/3/2014); inject nerv blck,stellate ganglion (12/10/2014); percut implnt neuroelect,epidural (2/4/2015); percut implnt neuroelect,epidural (2/4/2015); spinal cord stimulator (2/19/2015); gastroscopy with biopsy (6/22/2013); and other orthopedic surgery.    CURRENT MEDICATIONS  Home Medications    **Home medications have not yet been reviewed for this encounter**         ALLERGIES  Allergies   Allergen Reactions   • Morphine Swelling     Redness and itching   • Morphine  "Sulfate [Morphine]      unknown       PHYSICAL EXAM  VITAL SIGNS: /98   Pulse 77   Temp 36.9 °C (98.5 °F) (Temporal)   Resp 18   Ht 1.778 m (5' 10\")   Wt 94 kg (207 lb 3.7 oz)   SpO2 99%   BMI 29.73 kg/m²   Pulse ox interpretation: I interpret this pulse ox as normal.  Constitutional: Alert in no apparent distress.  HENT: No signs of trauma, Bilateral external ears normal, Nose normal.   Eyes: Pupils are equal and reactive, Conjunctiva normal, Non-icteric.   Neck: Normal range of motion, No tenderness, Supple, No stridor.   Cardiovascular: Regular rate and rhythm.   Thorax & Lungs: Normal breath sounds, No respiratory distress  Skin: Warm, Dry, No erythema, No rash.   Back: No bony tenderness, No CVA tenderness.  Spinal nerve stimulator in place  Extremities: Intact distal pulses, No edema, No tenderness, No cyanosis  Musculoskeletal: Good range of motion in all major joints. No tenderness to palpation or major deformities noted.   Neurologic: Alert, Normal motor function and gait, Normal sensory function, No focal deficits noted.         DIAGNOSTIC STUDIES / PROCEDURES      RADIOLOGY  US-EXTREMITY VENOUS UPPER UNILAT LEFT   Final Result         NO EVIDENCE OF LEFT UPPER EXTREMITY DEEP VENOUS THROMBOSIS.          COURSE & MEDICAL DECISION MAKING    Medications   ketorolac (TORADOL) injection 15 mg (15 mg Intramuscular Given 10/27/19 1335)       Pertinent Labs & Imaging studies reviewed. (See chart for details)  44 y.o. male presenting with left arm pain.  Denies any trauma.  Normal range of motion.  Neurovascularly intact distally.  No erythema or skin changes/rash.  Patient does have a history of complex regional pain syndrome to the right upper extremity for which she has a nerve stimulator in place.  This along with pain medication has not been helping his symptoms.  No neck pain or stiffness to suggest cervical radiculopathy.  Ultrasound of the left upper extremity was performed for further " "evaluation.  No evidence of deep space infection.  I suspect neuropathic pain.  Neurovascularly intact distally with normal pulses and sensation and strength in his left hand.  The patient is right-hand dominant.  Recommending following up with his pain specialist, Dr. Coy.      The patient will not drink alcohol nor drive with prescribed medications.   The patient was instructed to follow-up with primary care physician for further management.  To return immediately for any worsening symptoms or development of any other concerning signs or symptoms. The patient verbalizes understanding in their own words.    /98   Pulse 77   Temp 36.9 °C (98.5 °F) (Temporal)   Resp 18   Ht 1.778 m (5' 10\")   Wt 94 kg (207 lb 3.7 oz)   SpO2 99%   BMI 29.73 kg/m²     The patient was referred to primary care where they will receive further BP management.      [facility], Emergency Dept  [address]  [place] [address]  [phone]    As needed, If symptoms worsen    Primary care doctor    Schedule an appointment as soon as possible for a visit         FINAL IMPRESSION  1. Left upper arm pain            Electronically signed by: Mehran Frank, 10/27/2019 11:35 AM    "

## 2019-10-27 NOTE — ED NOTES
"Pt with Hx of \"CRPS\" to R arm (with implanted pain stimulator), came in c/o L arm pain for the past 3 days while on a flight, pt states he flights \"a lot\"  pt described pain as \"someone is punching me\" negative for calf pain.   "

## 2019-10-27 NOTE — ED NOTES
Discharge instructions provided.  Pt verbalized the understanding of discharge instructions to follow up with PCP, Dr. Coy and to return to ER if condition worsens.  Pt ambulated out of ER without difficulty.

## 2020-11-30 ENCOUNTER — OFFICE VISIT (OUTPATIENT)
Dept: URGENT CARE | Facility: PHYSICIAN GROUP | Age: 45
End: 2020-11-30
Payer: COMMERCIAL

## 2020-11-30 ENCOUNTER — HOSPITAL ENCOUNTER (OUTPATIENT)
Facility: MEDICAL CENTER | Age: 45
End: 2020-11-30
Attending: PHYSICIAN ASSISTANT
Payer: COMMERCIAL

## 2020-11-30 VITALS
RESPIRATION RATE: 16 BRPM | WEIGHT: 202 LBS | HEART RATE: 88 BPM | OXYGEN SATURATION: 98 % | DIASTOLIC BLOOD PRESSURE: 78 MMHG | BODY MASS INDEX: 28.92 KG/M2 | TEMPERATURE: 97.4 F | HEIGHT: 70 IN | SYSTOLIC BLOOD PRESSURE: 118 MMHG

## 2020-11-30 DIAGNOSIS — J06.9 UPPER RESPIRATORY TRACT INFECTION, UNSPECIFIED TYPE: ICD-10-CM

## 2020-11-30 DIAGNOSIS — Z20.822 SUSPECTED COVID-19 VIRUS INFECTION: Primary | ICD-10-CM

## 2020-11-30 DIAGNOSIS — Z20.822 SUSPECTED COVID-19 VIRUS INFECTION: ICD-10-CM

## 2020-11-30 LAB — COVID ORDER STATUS COVID19: NORMAL

## 2020-11-30 PROCEDURE — 99214 OFFICE O/P EST MOD 30 MIN: CPT | Mod: CS | Performed by: PHYSICIAN ASSISTANT

## 2020-11-30 PROCEDURE — U0003 INFECTIOUS AGENT DETECTION BY NUCLEIC ACID (DNA OR RNA); SEVERE ACUTE RESPIRATORY SYNDROME CORONAVIRUS 2 (SARS-COV-2) (CORONAVIRUS DISEASE [COVID-19]), AMPLIFIED PROBE TECHNIQUE, MAKING USE OF HIGH THROUGHPUT TECHNOLOGIES AS DESCRIBED BY CMS-2020-01-R: HCPCS

## 2020-11-30 ASSESSMENT — ENCOUNTER SYMPTOMS
SINUS PRESSURE: 1
CONSTIPATION: 0
DIARRHEA: 0
VOMITING: 0
ABDOMINAL PAIN: 0
FEVER: 0
CHILLS: 1
NAUSEA: 0
SHORTNESS OF BREATH: 1
COUGH: 1

## 2020-11-30 ASSESSMENT — FIBROSIS 4 INDEX: FIB4 SCORE: 1.36

## 2020-11-30 NOTE — PROGRESS NOTES
Subjective:   Jarod Fields is a 45 y.o. male who presents for Nasal Congestion (fever, cough, body aches,congestion, 4x days, )        Sinusitis  This is a new problem. Episode onset: 4 days  The problem is unchanged. The fever has been present for 1 to 2 days. Associated symptoms include chills, congestion, coughing, shortness of breath (resolved ) and sinus pressure. (Decreased appetite, myalgia, change of smell ) Treatments tried: ibuprofen, dayquil/nyquil  The treatment provided mild relief.     Multiple family members with similar sx. No known covid exposure.     Review of Systems   Constitutional: Positive for chills. Negative for fever and malaise/fatigue.   HENT: Positive for congestion and sinus pressure.    Respiratory: Positive for cough and shortness of breath (resolved ).    Gastrointestinal: Negative for abdominal pain, constipation, diarrhea, nausea and vomiting.   All other systems reviewed and are negative.      PMH:  has a past medical history of Pain (2/18/15). He also has no past medical history of ASTHMA, CAD (coronary artery disease), Cancer ([facility]), Congestive heart failure (HCC), COPD, Diabetes, Hypertension, Infectious disease, Liver disease, Psychiatric disorder, Renal disorder, Seizure disorder ([facility]), or Stroke ([facility]).  MEDS:   Current Outpatient Medications:   •  ibuprofen (MOTRIN) 600 MG Tab, Take 600 mg by mouth every 6 hours as needed., Disp: , Rfl:   •  Lido-Capsaicin-Men-Methyl Sal (MEDI-PATCH-LIDOCAINE EX), by Apply externally route., Disp: , Rfl:   •  oxycodone-acetaminophen (PERCOCET-10)  MG Tab, Take 1-2 Tabs by mouth every four hours as needed for Severe Pain., Disp: , Rfl:   ALLERGIES:   Allergies   Allergen Reactions   • Morphine Swelling     Redness and itching   • Morphine Sulfate [Morphine]      unknown     SURGHX:   Past Surgical History:   Procedure Laterality Date   • SPINAL CORD STIMULATOR  2/19/2015    Performed by Ghassan Coy M.D. at [facility]  "ORS   • PB PERCUT IMPLNT NEUROELECT,EPIDURAL  2/4/2015    Performed by Ghassan Coy M.D. at [facility] ORS   • PB PERCUT IMPLNT NEUROELECT,EPIDURAL  2/4/2015    Performed by Ghassan Coy M.D. at [facility] ORS   • PB INJECT NERV BLCK,STELLATE GANGLION  12/10/2014    Performed by Ghassan Coy M.D. at [facility] ORS   • PB INJECT NERV BLCK,STELLATE GANGLION  12/3/2014    Performed by Ghassan Coy M.D. at [facility] ORS   • PB INJECT NERV BLCK,STELLATE GANGLION  11/26/2014    Performed by Ghassan Coy M.D. at [facility] ORS   • GASTROSCOPY WITH BIOPSY  6/22/2013    Performed by Robbie Nesbitt M.D. at ENDOSCOPY [facility] ORS   • OTHER ORTHOPEDIC SURGERY      L tib/fib w/hardware-removed     SOCHX:  reports that he has been smoking. He has been smoking about 0.50 packs per day. He has never used smokeless tobacco. He reports current alcohol use. He reports that he does not use drugs.  Family History   Problem Relation Age of Onset   • Hypertension Other    • Cancer Other         Objective:   /78 (BP Location: Left arm, Patient Position: Sitting, BP Cuff Size: Adult long)   Pulse 88   Temp 36.3 °C (97.4 °F) (Temporal)   Resp 16   Ht 1.778 m (5' 10\") Comment: per pt  Wt 91.6 kg (202 lb) Comment: per pt  SpO2 98%   BMI 28.98 kg/m²     Physical Exam  Vitals signs reviewed.   Constitutional:       General: He is not in acute distress.     Appearance: Normal appearance. He is well-developed. He is not ill-appearing.   HENT:      Head: Normocephalic and atraumatic.      Right Ear: Tympanic membrane and external ear normal.      Left Ear: Tympanic membrane and external ear normal.      Nose: Nose normal.      Mouth/Throat:      Mouth: Mucous membranes are moist.      Pharynx: Posterior oropharyngeal erythema present. No oropharyngeal exudate.   Eyes:      Conjunctiva/sclera: Conjunctivae normal.      Pupils: Pupils are equal, round, and reactive " to light.   Neck:      Musculoskeletal: Normal range of motion and neck supple. No neck rigidity.      Trachea: No tracheal deviation.   Cardiovascular:      Rate and Rhythm: Normal rate and regular rhythm.   Pulmonary:      Effort: Pulmonary effort is normal. No respiratory distress.      Breath sounds: Normal breath sounds. No stridor. No wheezing, rhonchi or rales.   Lymphadenopathy:      Cervical: No cervical adenopathy.   Skin:     General: Skin is warm and dry.      Capillary Refill: Capillary refill takes less than 2 seconds.   Neurological:      General: No focal deficit present.      Mental Status: He is alert and oriented to person, place, and time.   Psychiatric:         Mood and Affect: Mood normal.         Behavior: Behavior normal.           Assessment/Plan:     1. Suspected COVID-19 virus infection  COVID/SARS COV-2 PCR   2. Upper respiratory tract infection, unspecified type       Supportive care reviewed. Alternate tylenol/ibuprofen. Monitor sx closely. He will be notified of final test results via [employer]. The patient will quarantine until covid test results are finalized. The pt will continue to quarantine until resolution of symptoms for 24 hours without the use of antipyretics. If test results are positive the pt will also wait at least 10 days have passed since onset of symptoms per CDC guidelines. Covid handout provided.     If symptoms worsen or persist patient can return to clinic for reevaluation.  Red flags and emergency room precautions discussed. Patient confirmed understanding of information.    Please note that this dictation was created using voice recognition software. I have made every reasonable attempt to correct obvious errors, but I expect that there are errors of grammar and possibly content that I did not discover before finalizing the note.

## 2020-12-01 LAB
SARS-COV-2 RNA RESP QL NAA+PROBE: DETECTED
SPECIMEN SOURCE: ABNORMAL

## 2021-01-05 ENCOUNTER — TELEPHONE (OUTPATIENT)
Dept: URGENT CARE | Facility: PHYSICIAN GROUP | Age: 46
End: 2021-01-05

## 2021-01-05 ENCOUNTER — OFFICE VISIT (OUTPATIENT)
Dept: URGENT CARE | Facility: PHYSICIAN GROUP | Age: 46
End: 2021-01-05
Payer: COMMERCIAL

## 2021-01-05 VITALS
BODY MASS INDEX: 28.63 KG/M2 | DIASTOLIC BLOOD PRESSURE: 80 MMHG | HEART RATE: 76 BPM | RESPIRATION RATE: 18 BRPM | HEIGHT: 70 IN | SYSTOLIC BLOOD PRESSURE: 118 MMHG | OXYGEN SATURATION: 98 % | WEIGHT: 200 LBS | TEMPERATURE: 97.1 F

## 2021-01-05 DIAGNOSIS — H10.31 ACUTE BACTERIAL CONJUNCTIVITIS OF RIGHT EYE: ICD-10-CM

## 2021-01-05 PROCEDURE — 99214 OFFICE O/P EST MOD 30 MIN: CPT | Performed by: NURSE PRACTITIONER

## 2021-01-05 RX ORDER — ERYTHROMYCIN 5 MG/G
1 OINTMENT OPHTHALMIC 3 TIMES DAILY
Qty: 3.5 G | Refills: 0 | Status: SHIPPED | OUTPATIENT
Start: 2021-01-05 | End: 2021-01-12

## 2021-01-05 RX ORDER — POLYMYXIN B SULFATE AND TRIMETHOPRIM 1; 10000 MG/ML; [USP'U]/ML
1 SOLUTION OPHTHALMIC 4 TIMES DAILY
Qty: 10 ML | Refills: 0 | Status: SHIPPED | OUTPATIENT
Start: 2021-01-05 | End: 2021-01-12

## 2021-01-05 ASSESSMENT — ENCOUNTER SYMPTOMS
COUGH: 0
PHOTOPHOBIA: 1
EYE DISCHARGE: 0
DIZZINESS: 0
EYE REDNESS: 1
MYALGIAS: 0
SINUS PAIN: 0
CHILLS: 0
BLURRED VISION: 0
DOUBLE VISION: 0
WEAKNESS: 0
FEVER: 0
HEADACHES: 0
SORE THROAT: 0
EYE PAIN: 1

## 2021-01-05 ASSESSMENT — FIBROSIS 4 INDEX: FIB4 SCORE: 1.36

## 2021-01-05 NOTE — PROGRESS NOTES
Subjective:      Jarod Fields is a 45 y.o. male who presents with Eye Pain (R eye, red, light sensativity, x1 day )            HPI  C/o right eye redness, irritation started yesterday, admits to rubbing eye. Denies foreign material to eye. Denies seasonal allergies or working in dirty/gilda environment. Child has new cat and has been cleaning out d/c from its' eyes. Denies vision change, but has light sensitivity. No previous eye problems. Used Visine and increased irritation and redness.     PMH:  has a past medical history of Pain (2/18/15). He also has no past medical history of ASTHMA, CAD (coronary artery disease), Cancer ([facility]), Congestive heart failure ([facility]), COPD, Diabetes, Hypertension, Infectious disease, Liver disease, Psychiatric disorder, Renal disorder, Seizure disorder ([facility]), or Stroke ([facility]).  MEDS:   Current Outpatient Medications:   •  ibuprofen (MOTRIN) 600 MG Tab, Take 600 mg by mouth every 6 hours as needed., Disp: , Rfl:   •  Lido-Capsaicin-Men-Methyl Sal (MEDI-PATCH-LIDOCAINE EX), by Apply externally route., Disp: , Rfl:   •  oxycodone-acetaminophen (PERCOCET-10)  MG Tab, Take 1-2 Tabs by mouth every four hours as needed for Severe Pain., Disp: , Rfl:   ALLERGIES:   Allergies   Allergen Reactions   • Morphine Swelling     Redness and itching   • Morphine Sulfate [Morphine]      unknown     SURGHX:   Past Surgical History:   Procedure Laterality Date   • SPINAL CORD STIMULATOR  2/19/2015    Performed by Ghassan Coy M.D. at [facility] ORS   • PB PERCUT IMPLNT NEUROELECT,EPIDURAL  2/4/2015    Performed by Ghassan Coy M.D. at [facility] ORS   • PB PERCUT IMPLNT NEUROELECT,EPIDURAL  2/4/2015    Performed by Ghassan Coy M.D. at [facility] ORS   • PB INJECT NERV BLCK,STELLATE GANGLION  12/10/2014    Performed by Ghassan Coy M.D. at [facility] ORS   • PB INJECT NERV BLCK,STELLATE GANGLION  12/3/2014    Performed by Ghassan Coy M.D.  "at SUR[facility]ERY SUR[facility] ORS   • PB INJECT NERV BLCK,STELLATE GANGLION  11/26/2014    Performed by Ghassan Coy M.D. at SUR[facility] SUR[facility] ORS   • GASTROSCOPY WITH BIOPSY  6/22/2013    Performed by Robbie Nesbitt M.D. at ENDOSCOPY [facility] ORS   • OTHER ORTHOPEDIC SURGERY      L tib/fib w/hardware-removed     SOCHX:  reports that he has been smoking. He has been smoking about 0.50 packs per day. He has never used smokeless tobacco. He reports current alcohol use. He reports that he does not use drugs.  FH: Family history was reviewed, no pertinent findings to report    Review of Systems   Constitutional: Negative for chills, fever and malaise/fatigue.   HENT: Negative for congestion, ear pain, sinus pain and sore throat.    Eyes: Positive for photophobia, pain and redness. Negative for blurred vision, double vision and discharge.   Respiratory: Negative for cough.    Musculoskeletal: Negative for myalgias.   Skin: Negative for itching and rash.   Neurological: Negative for dizziness, weakness and headaches.   Endo/Heme/Allergies: Negative for environmental allergies.   All other systems reviewed and are negative.         Objective:     /80 (BP Location: Left arm, Patient Position: Sitting, BP Cuff Size: Adult)   Pulse 76   Temp 36.2 °C (97.1 °F) (Temporal)   Resp 18   Ht 1.778 m (5' 10\")   Wt 90.7 kg (200 lb)   SpO2 98%   BMI 28.70 kg/m²      Physical Exam  Vitals signs reviewed.   Constitutional:       General: He is awake. He is not in acute distress.     Appearance: Normal appearance. He is well-developed. He is not ill-appearing, toxic-appearing or diaphoretic.   HENT:      Head: Normocephalic.      Nose: Rhinorrhea present.   Eyes:      General: Lids are normal. No visual field deficit or scleral icterus.        Right eye: No foreign body, discharge or hordeolum.      Extraocular Movements: Extraocular movements intact.      Conjunctiva/sclera:      Right eye: Right conjunctiva is " injected.      Comments: Redness to lower conjunctiva and inner corner of right eye.   Cardiovascular:      Rate and Rhythm: Normal rate.   Pulmonary:      Effort: Pulmonary effort is normal.   Neurological:      Mental Status: He is alert and oriented to person, place, and time.   Psychiatric:         Behavior: Behavior is cooperative.                 Assessment/Plan:        1. Acute bacterial conjunctivitis of right eye    - polymixin-trimethoprim (POLYTRIM) 49212-9.1 UNIT/ML-% Solution; Administer 1 Drop into the right eye 4 times a day for 7 days.  Dispense: 10 mL; Refill: 0    Avoid Visine or other eye drops at this time    May use longer acting antihistamine x 7 days prn for any itchiness  May use cool compresses for any eye swelling  Avoid touching eyes  May clean eyes with mild dilute soap along eyelash line with eyes closed, rinse with plenty of water if eye d/c  May use saline eye rinse or eye irrigation solution prn for eye dryness/irritation  Monitor for increase in redness or swelling, vision change, eye pain- need re-evaluation immediately

## 2021-01-06 NOTE — TELEPHONE ENCOUNTER
Patients' mother called to say the prescribed eye drops were burning his eyes despite using after saline eye solution rinse. Will prescribe eye ointment instead. [facility] Rx.

## 2021-03-24 ENCOUNTER — HOSPITAL ENCOUNTER (OUTPATIENT)
Dept: LAB | Facility: MEDICAL CENTER | Age: 46
End: 2021-03-24
Attending: FAMILY MEDICINE
Payer: COMMERCIAL

## 2021-03-24 LAB
ALBUMIN SERPL BCP-MCNC: 4.7 G/DL (ref 3.2–4.9)
ALBUMIN/GLOB SERPL: 1.5 G/DL
ALP SERPL-CCNC: 72 U/L (ref 30–99)
ALT SERPL-CCNC: 77 U/L (ref 2–50)
ANION GAP SERPL CALC-SCNC: 8 MMOL/L (ref 7–16)
AST SERPL-CCNC: 39 U/L (ref 12–45)
BILIRUB SERPL-MCNC: 0.7 MG/DL (ref 0.1–1.5)
BUN SERPL-MCNC: 18 MG/DL (ref 8–22)
CALCIUM SERPL-MCNC: 9.7 MG/DL (ref 8.5–10.5)
CHLORIDE SERPL-SCNC: 103 MMOL/L (ref 96–112)
CHOLEST SERPL-MCNC: 193 MG/DL (ref 100–199)
CO2 SERPL-SCNC: 27 MMOL/L (ref 20–33)
CREAT SERPL-MCNC: 0.83 MG/DL (ref 0.5–1.4)
FASTING STATUS PATIENT QL REPORTED: NORMAL
GLOBULIN SER CALC-MCNC: 3.1 G/DL (ref 1.9–3.5)
GLUCOSE SERPL-MCNC: 92 MG/DL (ref 65–99)
HDLC SERPL-MCNC: 40 MG/DL
LDLC SERPL CALC-MCNC: 133 MG/DL
POTASSIUM SERPL-SCNC: 4.3 MMOL/L (ref 3.6–5.5)
PROT SERPL-MCNC: 7.8 G/DL (ref 6–8.2)
SODIUM SERPL-SCNC: 138 MMOL/L (ref 135–145)
TRIGL SERPL-MCNC: 102 MG/DL (ref 0–149)

## 2021-03-24 PROCEDURE — 80053 COMPREHEN METABOLIC PANEL: CPT

## 2021-03-24 PROCEDURE — 80061 LIPID PANEL: CPT

## 2021-03-24 PROCEDURE — 36415 COLL VENOUS BLD VENIPUNCTURE: CPT

## 2021-05-05 ENCOUNTER — HOSPITAL ENCOUNTER (EMERGENCY)
Facility: MEDICAL CENTER | Age: 46
End: 2021-05-05
Attending: EMERGENCY MEDICINE
Payer: COMMERCIAL

## 2021-05-05 ENCOUNTER — APPOINTMENT (OUTPATIENT)
Dept: RADIOLOGY | Facility: MEDICAL CENTER | Age: 46
End: 2021-05-05
Attending: EMERGENCY MEDICINE
Payer: COMMERCIAL

## 2021-05-05 VITALS
DIASTOLIC BLOOD PRESSURE: 88 MMHG | SYSTOLIC BLOOD PRESSURE: 136 MMHG | HEART RATE: 81 BPM | TEMPERATURE: 98.2 F | WEIGHT: 198.41 LBS | OXYGEN SATURATION: 94 % | BODY MASS INDEX: 27.78 KG/M2 | HEIGHT: 71 IN | RESPIRATION RATE: 18 BRPM

## 2021-05-05 DIAGNOSIS — M54.42 ACUTE LEFT-SIDED LOW BACK PAIN WITH LEFT-SIDED SCIATICA: ICD-10-CM

## 2021-05-05 PROCEDURE — 700102 HCHG RX REV CODE 250 W/ 637 OVERRIDE(OP): Performed by: EMERGENCY MEDICINE

## 2021-05-05 PROCEDURE — 74176 CT ABD & PELVIS W/O CONTRAST: CPT

## 2021-05-05 PROCEDURE — 700101 HCHG RX REV CODE 250: Performed by: EMERGENCY MEDICINE

## 2021-05-05 PROCEDURE — 96372 THER/PROPH/DIAG INJ SC/IM: CPT

## 2021-05-05 PROCEDURE — 700111 HCHG RX REV CODE 636 W/ 250 OVERRIDE (IP): Performed by: EMERGENCY MEDICINE

## 2021-05-05 PROCEDURE — A9270 NON-COVERED ITEM OR SERVICE: HCPCS | Performed by: EMERGENCY MEDICINE

## 2021-05-05 PROCEDURE — 96374 THER/PROPH/DIAG INJ IV PUSH: CPT

## 2021-05-05 PROCEDURE — 99284 EMERGENCY DEPT VISIT MOD MDM: CPT

## 2021-05-05 RX ORDER — KETOROLAC TROMETHAMINE 30 MG/ML
30 INJECTION, SOLUTION INTRAMUSCULAR; INTRAVENOUS ONCE
Status: COMPLETED | OUTPATIENT
Start: 2021-05-05 | End: 2021-05-05

## 2021-05-05 RX ORDER — CYCLOBENZAPRINE HCL 10 MG
10 TABLET ORAL ONCE
Status: COMPLETED | OUTPATIENT
Start: 2021-05-05 | End: 2021-05-05

## 2021-05-05 RX ORDER — HYDROMORPHONE HYDROCHLORIDE 1 MG/ML
1 INJECTION, SOLUTION INTRAMUSCULAR; INTRAVENOUS; SUBCUTANEOUS ONCE
Status: COMPLETED | OUTPATIENT
Start: 2021-05-05 | End: 2021-05-05

## 2021-05-05 RX ORDER — KETOROLAC TROMETHAMINE 30 MG/ML
30 INJECTION, SOLUTION INTRAMUSCULAR; INTRAVENOUS ONCE
Status: DISCONTINUED | OUTPATIENT
Start: 2021-05-05 | End: 2021-05-05

## 2021-05-05 RX ORDER — CYCLOBENZAPRINE HCL 5 MG
5-10 TABLET ORAL 3 TIMES DAILY PRN
Qty: 10 TABLET | Refills: 0 | Status: ON HOLD | OUTPATIENT
Start: 2021-05-05 | End: 2021-05-16

## 2021-05-05 RX ORDER — LIDOCAINE 50 MG/G
1 PATCH TOPICAL EVERY 24 HOURS
Status: DISCONTINUED | OUTPATIENT
Start: 2021-05-05 | End: 2021-05-05 | Stop reason: HOSPADM

## 2021-05-05 RX ADMIN — HYDROMORPHONE HYDROCHLORIDE 1 MG: 1 INJECTION, SOLUTION INTRAMUSCULAR; INTRAVENOUS; SUBCUTANEOUS at 11:54

## 2021-05-05 RX ADMIN — HYDROMORPHONE HYDROCHLORIDE 1 MG: 1 INJECTION, SOLUTION INTRAMUSCULAR; INTRAVENOUS; SUBCUTANEOUS at 14:40

## 2021-05-05 RX ADMIN — CYCLOBENZAPRINE 10 MG: 10 TABLET, FILM COATED ORAL at 11:54

## 2021-05-05 RX ADMIN — KETOROLAC TROMETHAMINE 30 MG: 30 INJECTION, SOLUTION INTRAMUSCULAR; INTRAVENOUS at 14:40

## 2021-05-05 RX ADMIN — LIDOCAINE 1 PATCH: 50 PATCH TOPICAL at 11:54

## 2021-05-05 ASSESSMENT — FIBROSIS 4 INDEX: FIB4 SCORE: 1.08

## 2021-05-05 NOTE — ED TRIAGE NOTES
Pain to R lower back started last night  Denies any trauma. Went to chiropractor today and now pain is 10/10.

## 2021-05-05 NOTE — ED NOTES
Pt resting on gurney. States the pain is better after the medications and when he is laying flat. No other needs at this time. Pulse ox placed on pt.

## 2021-05-05 NOTE — ED PROVIDER NOTES
ED Provider Note    CHIEF COMPLAINT  Chief Complaint   Patient presents with   • Low Back Pain     R sided back pain started last night  Unable to walk today and pain down leg       HPI  Jarod Fields is a 46 y.o. male who presents to the emergency department with acute onset of right sided back pain.  He states it started last night suddenly while he was sleeping.  He works as a  but does not do any heavy lifting.  He states that he has a history of reflex sympathetic dystrophy and does have an implanted neurostimulator on the left.  He states the pain is not on the left has had no fever or trauma to that side he has no midline pain.  He is complaining of pain on the right side that he states is the worst pain he is ever experienced it is 10 out of 10.  He was unable to walk secondary to the pain not to weakness.  The patient states he has had no weakness no loss of bowel or bladder function no history of IV drug use fever.  He went to the chiropractor and the chiropractor did manipulations a laser therapy and stretching which only made the pain significantly worse.  The patient states he is most comfortable if he is able to lie on his abdomen    REVIEW OF SYSTEMS  Patient denies history of cancer, fever, trauma, numbness, weakness, loss of bowel or bladder function. See HPI for further details. All other systems are negative.     PAST MEDICAL HISTORY   has a past medical history of Pain (2/18/15).    FAMILY HISTORY  Family History   Problem Relation Age of Onset   • Hypertension Other    • Cancer Other        SOCIAL HISTORY  Social History     Tobacco Use   • Smoking status: Current Every Day Smoker     Packs/day: 0.50   • Smokeless tobacco: Never Used   • Tobacco comment: 1/2 ppd since 15yo, on chantix   Substance and Sexual Activity   • Alcohol use: Yes     Comment: weekly   • Drug use: No     Comment: denies    • Sexual activity: Not on file       SURGICAL HISTORY   has a past surgical  "history that includes inject nerv blck,stellate ganglion (11/26/2014); inject nerv blck,stellate ganglion (12/3/2014); inject nerv blck,stellate ganglion (12/10/2014); percut implnt neuroelect,epidural (2/4/2015); percut implnt neuroelect,epidural (2/4/2015); spinal cord stimulator (2/19/2015); gastroscopy with biopsy (6/22/2013); and other orthopedic surgery.    CURRENT MEDICATIONS  Home Medications    Medication Sig Taking? Last Dose Authorizing Provider   cyclobenzaprine (FLEXERIL) 5 mg tablet Take 1-2 Tablets by mouth 3 times a day as needed. Yes  Pamela Herrera M.D.   ibuprofen (MOTRIN) 600 MG Tab Take 600 mg by mouth every 6 hours as needed.   Physician Outpatient   Lido-Capsaicin-Men-Methyl Sal (MEDI-PATCH-LIDOCAINE EX) by Apply externally route.   Physician Outpatient   oxycodone-acetaminophen (PERCOCET-10)  MG Tab Take 1-2 Tabs by mouth every four hours as needed for Severe Pain.   Physician Outpatient       ALLERGIES  Allergies   Allergen Reactions   • Morphine Swelling     Redness and itching   • Morphine Sulfate [Morphine]      unknown       PHYSICAL EXAM  VITAL SIGNS: /60   Pulse 93   Temp 36.8 °C (98.2 °F) (Temporal)   Resp (!) 24   Ht 1.803 m (5' 11\")   Wt 90 kg (198 lb 6.6 oz)   SpO2 97%   BMI 27.67 kg/m²   Constitutional: Well developed, Well nourished, No acute distress, Non-toxic appearance.   HENT: No facial asymmetry, no midline cervical tenderness.  Cardiovascular: Normal perfusion  Thorax & Lungs: Normal breath sounds, No respiratory distress   Abdomen: Bowel sounds normal, Soft, No tenderness, No masses, No pulsatile masses.   Skin: Warm, Dry, No erythema, No rash.   Back: Patient appears to have acute spasm of his back, he has no tenderness on the left side where his stimulator is placed, his incision is clean and dry, he has no midline spinal tenderness,  Extremities: Intact distal pulses, No edema, no lower extremity weakness  Neurologic: Alert & oriented x 3, " Normal motor function, Normal sensory function, No focal deficits noted.         RADIOLOGY/PROCEDURES  CT-RENAL COLIC EVALUATION(A/P W/O)   Final Result      1.  No evidence of renal or ureteral stone or evidence of hydronephrosis. No evidence of inflammatory change.      2.  Implanted intraspinal pain treatment device present.      3.  Sigmoid diverticulosis.              COURSE & MEDICAL DECISION MAKING  Pertinent Labs & Imaging studies reviewed. (See chart for details)    Differential diagnosis includes musculoskeletal back pain, acute radiculopathy, no fever or dysuria to suggest pyelonephritis,  acute cord compression, there is no unexplained fever and back pain or IV drug use to suggest an epidural abscess, there is no neurologic deficit or decreased bowel and bladder control or saddle anesthesia to suggest cauda equina syndrome.     In the emergency department patient was given IM Toradol, 1 mg of Dilaudid, Lidoderm patch and Flexeril.  At this point he was comfortable enough to be able to tolerate imaging. I am checking  CT to ensure no bony abnormality or other cause such as a renal stone as this was sudden in onset    Emergent MRI is not currently indicated as I am unclear if an MRI is possible with his spinal cord stimulator.  He will need to have this reviewed with Dr. Coy if the pain persists. I have placed a call to Dr. Coy who sees this patient for his chronic pain, he already has narcotics from Dr. Coy and I am talking to the pain management doctor to see if they are able to see the patient.  They had expressed to me that they will review the medical records from today and call him with further instructions after review with Dr. Coy    The patient is experiencing acute spasm I am going to give a prescription of Flexeril and he knows he needs to see Dr. Coy ASA    DISPOSITION:  Patient will be discharged home in stable condition.    FOLLOW UP:  Rya Mcdonnell D.O.  [address]  [place] [id number] [phone]          Ghassan Coy M.D.  [address] [place] [address] [phone]    Schedule an appointment as soon as possible for a visit           FINAL IMPRESSION  1. Acute low back pain    Electronically signed by: Pamela Herrera M.D., 5/5/2021 12:11 PM

## 2021-05-14 ENCOUNTER — HOSPITAL ENCOUNTER (OUTPATIENT)
Facility: MEDICAL CENTER | Age: 46
End: 2021-05-16
Attending: EMERGENCY MEDICINE | Admitting: STUDENT IN AN ORGANIZED HEALTH CARE EDUCATION/TRAINING PROGRAM
Payer: COMMERCIAL

## 2021-05-14 ENCOUNTER — HOSPITAL ENCOUNTER (EMERGENCY)
Dept: HOSPITAL 8 - ED | Age: 46
Discharge: LEFT BEFORE DISCHARGE | End: 2021-05-14
Payer: SELF-PAY

## 2021-05-14 ENCOUNTER — HOSPITAL ENCOUNTER (EMERGENCY)
Facility: MEDICAL CENTER | Age: 46
End: 2021-05-14
Attending: EMERGENCY MEDICINE
Payer: COMMERCIAL

## 2021-05-14 VITALS
HEART RATE: 81 BPM | RESPIRATION RATE: 20 BRPM | TEMPERATURE: 97.5 F | DIASTOLIC BLOOD PRESSURE: 88 MMHG | OXYGEN SATURATION: 94 % | WEIGHT: 211.64 LBS | SYSTOLIC BLOOD PRESSURE: 135 MMHG | HEIGHT: 71 IN | BODY MASS INDEX: 29.63 KG/M2

## 2021-05-14 VITALS — SYSTOLIC BLOOD PRESSURE: 170 MMHG | DIASTOLIC BLOOD PRESSURE: 106 MMHG

## 2021-05-14 VITALS — HEIGHT: 70 IN | BODY MASS INDEX: 30.39 KG/M2 | WEIGHT: 212.31 LBS

## 2021-05-14 DIAGNOSIS — E87.1 HYPONATREMIA: ICD-10-CM

## 2021-05-14 DIAGNOSIS — E87.6 HYPOKALEMIA: ICD-10-CM

## 2021-05-14 DIAGNOSIS — M54.41 ACUTE RIGHT-SIDED LOW BACK PAIN WITH RIGHT-SIDED SCIATICA: ICD-10-CM

## 2021-05-14 DIAGNOSIS — R10.9: Primary | ICD-10-CM

## 2021-05-14 DIAGNOSIS — Z53.21: ICD-10-CM

## 2021-05-14 DIAGNOSIS — G90.529 COMPLEX REGIONAL PAIN SYNDROME TYPE 1 OF LOWER EXTREMITY, UNSPECIFIED LATERALITY: ICD-10-CM

## 2021-05-14 DIAGNOSIS — R30.0 DYSURIA: ICD-10-CM

## 2021-05-14 DIAGNOSIS — R33.9 URINARY RETENTION: ICD-10-CM

## 2021-05-14 DIAGNOSIS — R74.01 TRANSAMINITIS: ICD-10-CM

## 2021-05-14 DIAGNOSIS — M54.16 LUMBAR RADICULOPATHY: ICD-10-CM

## 2021-05-14 LAB
ANION GAP SERPL CALC-SCNC: 9 MMOL/L (ref 7–16)
BASOPHILS # BLD AUTO: 0.5 % (ref 0–1.8)
BASOPHILS # BLD: 0.02 K/UL (ref 0–0.12)
BUN SERPL-MCNC: 17 MG/DL (ref 8–22)
CALCIUM SERPL-MCNC: 9 MG/DL (ref 8.5–10.5)
CHLORIDE SERPL-SCNC: 106 MMOL/L (ref 96–112)
CO2 SERPL-SCNC: 24 MMOL/L (ref 20–33)
CREAT SERPL-MCNC: 0.79 MG/DL (ref 0.5–1.4)
CRP SERPL HS-MCNC: <0.3 MG/DL (ref 0–0.75)
EOSINOPHIL # BLD AUTO: 0.08 K/UL (ref 0–0.51)
EOSINOPHIL NFR BLD: 1.8 % (ref 0–6.9)
ERYTHROCYTE [DISTWIDTH] IN BLOOD BY AUTOMATED COUNT: 44.1 FL (ref 35.9–50)
GLUCOSE SERPL-MCNC: 104 MG/DL (ref 65–99)
HCT VFR BLD AUTO: 47.9 % (ref 42–52)
HGB BLD-MCNC: 16.4 G/DL (ref 14–18)
IMM GRANULOCYTES # BLD AUTO: 0.02 K/UL (ref 0–0.11)
IMM GRANULOCYTES NFR BLD AUTO: 0.5 % (ref 0–0.9)
LYMPHOCYTES # BLD AUTO: 1.78 K/UL (ref 1–4.8)
LYMPHOCYTES NFR BLD: 40.8 % (ref 22–41)
MCH RBC QN AUTO: 33.3 PG (ref 27–33)
MCHC RBC AUTO-ENTMCNC: 34.2 G/DL (ref 33.7–35.3)
MCV RBC AUTO: 97.2 FL (ref 81.4–97.8)
MONOCYTES # BLD AUTO: 0.36 K/UL (ref 0–0.85)
MONOCYTES NFR BLD AUTO: 8.3 % (ref 0–13.4)
NEUTROPHILS # BLD AUTO: 2.1 K/UL (ref 1.82–7.42)
NEUTROPHILS NFR BLD: 48.1 % (ref 44–72)
NRBC # BLD AUTO: 0 K/UL
NRBC BLD-RTO: 0 /100 WBC
PLATELET # BLD AUTO: 175 K/UL (ref 164–446)
PMV BLD AUTO: 9.3 FL (ref 9–12.9)
POTASSIUM SERPL-SCNC: 4.3 MMOL/L (ref 3.6–5.5)
RBC # BLD AUTO: 4.93 M/UL (ref 4.7–6.1)
SODIUM SERPL-SCNC: 139 MMOL/L (ref 135–145)
WBC # BLD AUTO: 4.4 K/UL (ref 4.8–10.8)

## 2021-05-14 PROCEDURE — 96375 TX/PRO/DX INJ NEW DRUG ADDON: CPT

## 2021-05-14 PROCEDURE — 36415 COLL VENOUS BLD VENIPUNCTURE: CPT

## 2021-05-14 PROCEDURE — 85652 RBC SED RATE AUTOMATED: CPT

## 2021-05-14 PROCEDURE — 99284 EMERGENCY DEPT VISIT MOD MDM: CPT

## 2021-05-14 PROCEDURE — 80048 BASIC METABOLIC PNL TOTAL CA: CPT

## 2021-05-14 PROCEDURE — U0005 INFEC AGEN DETEC AMPLI PROBE: HCPCS

## 2021-05-14 PROCEDURE — 51798 US URINE CAPACITY MEASURE: CPT

## 2021-05-14 PROCEDURE — 96374 THER/PROPH/DIAG INJ IV PUSH: CPT

## 2021-05-14 PROCEDURE — 99285 EMERGENCY DEPT VISIT HI MDM: CPT

## 2021-05-14 PROCEDURE — 85025 COMPLETE CBC W/AUTO DIFF WBC: CPT

## 2021-05-14 PROCEDURE — 700111 HCHG RX REV CODE 636 W/ 250 OVERRIDE (IP): Performed by: EMERGENCY MEDICINE

## 2021-05-14 PROCEDURE — 700111 HCHG RX REV CODE 636 W/ 250 OVERRIDE (IP): Performed by: STUDENT IN AN ORGANIZED HEALTH CARE EDUCATION/TRAINING PROGRAM

## 2021-05-14 PROCEDURE — U0003 INFECTIOUS AGENT DETECTION BY NUCLEIC ACID (DNA OR RNA); SEVERE ACUTE RESPIRATORY SYNDROME CORONAVIRUS 2 (SARS-COV-2) (CORONAVIRUS DISEASE [COVID-19]), AMPLIFIED PROBE TECHNIQUE, MAKING USE OF HIGH THROUGHPUT TECHNOLOGIES AS DESCRIBED BY CMS-2020-01-R: HCPCS

## 2021-05-14 PROCEDURE — 86140 C-REACTIVE PROTEIN: CPT

## 2021-05-14 PROCEDURE — 99281 EMR DPT VST MAYX REQ PHY/QHP: CPT

## 2021-05-14 RX ORDER — HYDROMORPHONE HYDROCHLORIDE 1 MG/ML
0.5 INJECTION, SOLUTION INTRAMUSCULAR; INTRAVENOUS; SUBCUTANEOUS ONCE
Status: COMPLETED | OUTPATIENT
Start: 2021-05-14 | End: 2021-05-14

## 2021-05-14 RX ORDER — KETOROLAC TROMETHAMINE 30 MG/ML
30 INJECTION, SOLUTION INTRAMUSCULAR; INTRAVENOUS ONCE
Status: COMPLETED | OUTPATIENT
Start: 2021-05-14 | End: 2021-05-14

## 2021-05-14 RX ORDER — ONDANSETRON 2 MG/ML
4 INJECTION INTRAMUSCULAR; INTRAVENOUS ONCE
Status: COMPLETED | OUTPATIENT
Start: 2021-05-14 | End: 2021-05-14

## 2021-05-14 RX ADMIN — HYDROMORPHONE HYDROCHLORIDE 0.5 MG: 1 INJECTION, SOLUTION INTRAMUSCULAR; INTRAVENOUS; SUBCUTANEOUS at 20:18

## 2021-05-14 RX ADMIN — ONDANSETRON 4 MG: 2 INJECTION INTRAMUSCULAR; INTRAVENOUS at 20:16

## 2021-05-14 RX ADMIN — KETOROLAC TROMETHAMINE 30 MG: 30 INJECTION, SOLUTION INTRAMUSCULAR at 20:15

## 2021-05-14 RX ADMIN — HYDROMORPHONE HYDROCHLORIDE 0.5 MG: 1 INJECTION, SOLUTION INTRAMUSCULAR; INTRAVENOUS; SUBCUTANEOUS at 22:35

## 2021-05-14 ASSESSMENT — LIFESTYLE VARIABLES
EVER HAD A DRINK FIRST THING IN THE MORNING TO STEADY YOUR NERVES TO GET RID OF A HANGOVER: NO
TOTAL SCORE: 0
ON A TYPICAL DAY WHEN YOU DRINK ALCOHOL HOW MANY DRINKS DO YOU HAVE: 0
HOW MANY TIMES IN THE PAST YEAR HAVE YOU HAD 5 OR MORE DRINKS IN A DAY: 0
TOTAL SCORE: 0
HAVE PEOPLE ANNOYED YOU BY CRITICIZING YOUR DRINKING: NO
DO YOU DRINK ALCOHOL: YES
HAVE YOU EVER FELT YOU SHOULD CUT DOWN ON YOUR DRINKING: NO
TOTAL SCORE: 0
CONSUMPTION TOTAL: NEGATIVE
EVER FELT BAD OR GUILTY ABOUT YOUR DRINKING: NO
AVERAGE NUMBER OF DAYS PER WEEK YOU HAVE A DRINK CONTAINING ALCOHOL: 0

## 2021-05-14 ASSESSMENT — FIBROSIS 4 INDEX
FIB4 SCORE: 1.08
FIB4 SCORE: 1.08

## 2021-05-15 ENCOUNTER — APPOINTMENT (OUTPATIENT)
Dept: RADIOLOGY | Facility: MEDICAL CENTER | Age: 46
End: 2021-05-15
Attending: STUDENT IN AN ORGANIZED HEALTH CARE EDUCATION/TRAINING PROGRAM
Payer: COMMERCIAL

## 2021-05-15 PROBLEM — R30.0 DYSURIA: Status: ACTIVE | Noted: 2021-05-15

## 2021-05-15 LAB
APPEARANCE UR: ABNORMAL
BACTERIA #/AREA URNS HPF: NEGATIVE /HPF
BILIRUB UR QL STRIP.AUTO: NEGATIVE
COLOR UR: YELLOW
EPI CELLS #/AREA URNS HPF: NEGATIVE /HPF
ERYTHROCYTE [SEDIMENTATION RATE] IN BLOOD BY WESTERGREN METHOD: 0 MM/HOUR (ref 0–20)
GLUCOSE UR STRIP.AUTO-MCNC: NEGATIVE MG/DL
HYALINE CASTS #/AREA URNS LPF: ABNORMAL /LPF
KETONES UR STRIP.AUTO-MCNC: NEGATIVE MG/DL
LEUKOCYTE ESTERASE UR QL STRIP.AUTO: NEGATIVE
MICRO URNS: ABNORMAL
NITRITE UR QL STRIP.AUTO: NEGATIVE
PH UR STRIP.AUTO: 6.5 [PH] (ref 5–8)
PROT UR QL STRIP: NEGATIVE MG/DL
RBC # URNS HPF: ABNORMAL /HPF
RBC UR QL AUTO: NEGATIVE
SARS-COV-2 RNA RESP QL NAA+PROBE: NOTDETECTED
SP GR UR STRIP.AUTO: 1.02
SPECIMEN SOURCE: NORMAL
UROBILINOGEN UR STRIP.AUTO-MCNC: 0.2 MG/DL
WBC #/AREA URNS HPF: ABNORMAL /HPF

## 2021-05-15 PROCEDURE — A9270 NON-COVERED ITEM OR SERVICE: HCPCS | Performed by: STUDENT IN AN ORGANIZED HEALTH CARE EDUCATION/TRAINING PROGRAM

## 2021-05-15 PROCEDURE — 700111 HCHG RX REV CODE 636 W/ 250 OVERRIDE (IP): Performed by: STUDENT IN AN ORGANIZED HEALTH CARE EDUCATION/TRAINING PROGRAM

## 2021-05-15 PROCEDURE — 96375 TX/PRO/DX INJ NEW DRUG ADDON: CPT

## 2021-05-15 PROCEDURE — 96372 THER/PROPH/DIAG INJ SC/IM: CPT

## 2021-05-15 PROCEDURE — 81001 URINALYSIS AUTO W/SCOPE: CPT

## 2021-05-15 PROCEDURE — 99219 PR INITIAL OBSERVATION CARE,LEVL II: CPT | Performed by: STUDENT IN AN ORGANIZED HEALTH CARE EDUCATION/TRAINING PROGRAM

## 2021-05-15 PROCEDURE — 700102 HCHG RX REV CODE 250 W/ 637 OVERRIDE(OP): Performed by: STUDENT IN AN ORGANIZED HEALTH CARE EDUCATION/TRAINING PROGRAM

## 2021-05-15 PROCEDURE — G0378 HOSPITAL OBSERVATION PER HR: HCPCS

## 2021-05-15 PROCEDURE — 96376 TX/PRO/DX INJ SAME DRUG ADON: CPT

## 2021-05-15 PROCEDURE — 72131 CT LUMBAR SPINE W/O DYE: CPT

## 2021-05-15 PROCEDURE — 700101 HCHG RX REV CODE 250: Performed by: STUDENT IN AN ORGANIZED HEALTH CARE EDUCATION/TRAINING PROGRAM

## 2021-05-15 RX ORDER — METHYLPREDNISOLONE 4 MG/1
4-24 TABLET ORAL DAILY
Status: ON HOLD | COMMUNITY
End: 2022-08-20

## 2021-05-15 RX ORDER — CARISOPRODOL 350 MG/1
350 TABLET ORAL EVERY 6 HOURS PRN
Status: DISCONTINUED | OUTPATIENT
Start: 2021-05-15 | End: 2021-05-16 | Stop reason: HOSPADM

## 2021-05-15 RX ORDER — KETOROLAC TROMETHAMINE 30 MG/ML
30 INJECTION, SOLUTION INTRAMUSCULAR; INTRAVENOUS EVERY 6 HOURS PRN
Status: DISCONTINUED | OUTPATIENT
Start: 2021-05-15 | End: 2021-05-15

## 2021-05-15 RX ORDER — TAMSULOSIN HYDROCHLORIDE 0.4 MG/1
0.4 CAPSULE ORAL
Status: DISCONTINUED | OUTPATIENT
Start: 2021-05-15 | End: 2021-05-16 | Stop reason: HOSPADM

## 2021-05-15 RX ORDER — OXYCODONE HYDROCHLORIDE 10 MG/1
10 TABLET ORAL
Status: DISCONTINUED | OUTPATIENT
Start: 2021-05-15 | End: 2021-05-16

## 2021-05-15 RX ORDER — PROMETHAZINE HYDROCHLORIDE 25 MG/1
12.5-25 SUPPOSITORY RECTAL EVERY 4 HOURS PRN
Status: DISCONTINUED | OUTPATIENT
Start: 2021-05-15 | End: 2021-05-16 | Stop reason: HOSPADM

## 2021-05-15 RX ORDER — ONDANSETRON 4 MG/1
4 TABLET, ORALLY DISINTEGRATING ORAL EVERY 4 HOURS PRN
Status: DISCONTINUED | OUTPATIENT
Start: 2021-05-15 | End: 2021-05-16 | Stop reason: HOSPADM

## 2021-05-15 RX ORDER — LIDOCAINE 50 MG/G
1 PATCH TOPICAL EVERY 24 HOURS
Status: DISCONTINUED | OUTPATIENT
Start: 2021-05-15 | End: 2021-05-16 | Stop reason: HOSPADM

## 2021-05-15 RX ORDER — OXYCODONE HYDROCHLORIDE 5 MG/1
5 TABLET ORAL ONCE
Status: COMPLETED | OUTPATIENT
Start: 2021-05-15 | End: 2021-05-15

## 2021-05-15 RX ORDER — PROMETHAZINE HYDROCHLORIDE 25 MG/1
12.5-25 TABLET ORAL EVERY 4 HOURS PRN
Status: DISCONTINUED | OUTPATIENT
Start: 2021-05-15 | End: 2021-05-16 | Stop reason: HOSPADM

## 2021-05-15 RX ORDER — ACETAMINOPHEN 500 MG
1000 TABLET ORAL 3 TIMES DAILY
Status: DISCONTINUED | OUTPATIENT
Start: 2021-05-15 | End: 2021-05-16 | Stop reason: HOSPADM

## 2021-05-15 RX ORDER — PROCHLORPERAZINE EDISYLATE 5 MG/ML
5-10 INJECTION INTRAMUSCULAR; INTRAVENOUS EVERY 4 HOURS PRN
Status: DISCONTINUED | OUTPATIENT
Start: 2021-05-15 | End: 2021-05-16 | Stop reason: HOSPADM

## 2021-05-15 RX ORDER — OXYCODONE HYDROCHLORIDE 5 MG/1
5 TABLET ORAL
Status: DISCONTINUED | OUTPATIENT
Start: 2021-05-15 | End: 2021-05-16

## 2021-05-15 RX ORDER — ONDANSETRON 2 MG/ML
4 INJECTION INTRAMUSCULAR; INTRAVENOUS EVERY 4 HOURS PRN
Status: DISCONTINUED | OUTPATIENT
Start: 2021-05-15 | End: 2021-05-16 | Stop reason: HOSPADM

## 2021-05-15 RX ORDER — HYDROMORPHONE HYDROCHLORIDE 2 MG/1
1 TABLET ORAL EVERY 6 HOURS PRN
Status: DISCONTINUED | OUTPATIENT
Start: 2021-05-15 | End: 2021-05-15

## 2021-05-15 RX ORDER — AMOXICILLIN 250 MG
2 CAPSULE ORAL 2 TIMES DAILY
Status: DISCONTINUED | OUTPATIENT
Start: 2021-05-15 | End: 2021-05-16 | Stop reason: HOSPADM

## 2021-05-15 RX ORDER — LABETALOL HYDROCHLORIDE 5 MG/ML
10 INJECTION, SOLUTION INTRAVENOUS EVERY 4 HOURS PRN
Status: DISCONTINUED | OUTPATIENT
Start: 2021-05-15 | End: 2021-05-16 | Stop reason: HOSPADM

## 2021-05-15 RX ORDER — BISACODYL 10 MG
10 SUPPOSITORY, RECTAL RECTAL
Status: DISCONTINUED | OUTPATIENT
Start: 2021-05-15 | End: 2021-05-16 | Stop reason: HOSPADM

## 2021-05-15 RX ORDER — ENALAPRILAT 1.25 MG/ML
1.25 INJECTION INTRAVENOUS EVERY 6 HOURS PRN
Status: DISCONTINUED | OUTPATIENT
Start: 2021-05-15 | End: 2021-05-16 | Stop reason: HOSPADM

## 2021-05-15 RX ORDER — KETOROLAC TROMETHAMINE 30 MG/ML
30 INJECTION, SOLUTION INTRAMUSCULAR; INTRAVENOUS EVERY 6 HOURS PRN
Status: DISCONTINUED | OUTPATIENT
Start: 2021-05-15 | End: 2021-05-16 | Stop reason: HOSPADM

## 2021-05-15 RX ORDER — CLONIDINE HYDROCHLORIDE 0.1 MG/1
0.1 TABLET ORAL EVERY 6 HOURS PRN
Status: DISCONTINUED | OUTPATIENT
Start: 2021-05-15 | End: 2021-05-16 | Stop reason: HOSPADM

## 2021-05-15 RX ORDER — CYCLOBENZAPRINE HCL 10 MG
5-10 TABLET ORAL 3 TIMES DAILY PRN
Status: DISCONTINUED | OUTPATIENT
Start: 2021-05-15 | End: 2021-05-15

## 2021-05-15 RX ORDER — POLYETHYLENE GLYCOL 3350 17 G/17G
1 POWDER, FOR SOLUTION ORAL
Status: DISCONTINUED | OUTPATIENT
Start: 2021-05-15 | End: 2021-05-16 | Stop reason: HOSPADM

## 2021-05-15 RX ADMIN — DOCUSATE SODIUM 50 MG AND SENNOSIDES 8.6 MG 2 TABLET: 8.6; 5 TABLET, FILM COATED ORAL at 05:25

## 2021-05-15 RX ADMIN — TAMSULOSIN HYDROCHLORIDE 0.4 MG: 0.4 CAPSULE ORAL at 08:39

## 2021-05-15 RX ADMIN — HYDROMORPHONE HYDROCHLORIDE 1 MG: 2 TABLET ORAL at 01:04

## 2021-05-15 RX ADMIN — ENOXAPARIN SODIUM 40 MG: 40 INJECTION SUBCUTANEOUS at 05:24

## 2021-05-15 RX ADMIN — DOCUSATE SODIUM 50 MG AND SENNOSIDES 8.6 MG 2 TABLET: 8.6; 5 TABLET, FILM COATED ORAL at 16:26

## 2021-05-15 RX ADMIN — ACETAMINOPHEN 1000 MG: 500 TABLET ORAL at 08:38

## 2021-05-15 RX ADMIN — FENTANYL CITRATE 50 MCG: 50 INJECTION, SOLUTION INTRAMUSCULAR; INTRAVENOUS at 15:31

## 2021-05-15 RX ADMIN — OXYCODONE HYDROCHLORIDE 10 MG: 10 TABLET ORAL at 13:52

## 2021-05-15 RX ADMIN — CARISOPRODOL 350 MG: 350 TABLET ORAL at 16:26

## 2021-05-15 RX ADMIN — FENTANYL CITRATE 50 MCG: 50 INJECTION, SOLUTION INTRAMUSCULAR; INTRAVENOUS at 20:05

## 2021-05-15 RX ADMIN — ACETAMINOPHEN 1000 MG: 500 TABLET ORAL at 20:05

## 2021-05-15 RX ADMIN — LIDOCAINE 1 PATCH: 50 PATCH TOPICAL at 16:27

## 2021-05-15 RX ADMIN — OXYCODONE HYDROCHLORIDE 10 MG: 10 TABLET ORAL at 21:35

## 2021-05-15 RX ADMIN — ACETAMINOPHEN 1000 MG: 500 TABLET ORAL at 15:07

## 2021-05-15 RX ADMIN — KETOROLAC TROMETHAMINE 30 MG: 30 INJECTION, SOLUTION INTRAMUSCULAR; INTRAVENOUS at 02:33

## 2021-05-15 RX ADMIN — FENTANYL CITRATE 50 MCG: 50 INJECTION, SOLUTION INTRAMUSCULAR; INTRAVENOUS at 15:07

## 2021-05-15 RX ADMIN — FENTANYL CITRATE 50 MCG: 50 INJECTION, SOLUTION INTRAMUSCULAR; INTRAVENOUS at 23:08

## 2021-05-15 RX ADMIN — OXYCODONE 5 MG: 5 TABLET ORAL at 05:25

## 2021-05-15 ASSESSMENT — COGNITIVE AND FUNCTIONAL STATUS - GENERAL
DAILY ACTIVITIY SCORE: 24
MOBILITY SCORE: 24
SUGGESTED CMS G CODE MODIFIER DAILY ACTIVITY: CH
SUGGESTED CMS G CODE MODIFIER MOBILITY: CH

## 2021-05-15 ASSESSMENT — PAIN DESCRIPTION - PAIN TYPE
TYPE: ACUTE PAIN

## 2021-05-15 ASSESSMENT — FIBROSIS 4 INDEX: FIB4 SCORE: 1.14

## 2021-05-15 ASSESSMENT — LIFESTYLE VARIABLES
TOTAL SCORE: 0
EVER HAD A DRINK FIRST THING IN THE MORNING TO STEADY YOUR NERVES TO GET RID OF A HANGOVER: NO
ON A TYPICAL DAY WHEN YOU DRINK ALCOHOL HOW MANY DRINKS DO YOU HAVE: 0
TOTAL SCORE: 0
CONSUMPTION TOTAL: NEGATIVE
HAVE YOU EVER FELT YOU SHOULD CUT DOWN ON YOUR DRINKING: NO
HAVE PEOPLE ANNOYED YOU BY CRITICIZING YOUR DRINKING: NO
ALCOHOL_USE: NO
EVER FELT BAD OR GUILTY ABOUT YOUR DRINKING: NO
DOES PATIENT WANT TO STOP DRINKING: NO
AVERAGE NUMBER OF DAYS PER WEEK YOU HAVE A DRINK CONTAINING ALCOHOL: 0
TOTAL SCORE: 0
HOW MANY TIMES IN THE PAST YEAR HAVE YOU HAD 5 OR MORE DRINKS IN A DAY: 0

## 2021-05-15 ASSESSMENT — ENCOUNTER SYMPTOMS
CARDIOVASCULAR NEGATIVE: 1
CONSTITUTIONAL NEGATIVE: 1
EYES NEGATIVE: 1
GASTROINTESTINAL NEGATIVE: 1
NEUROLOGICAL NEGATIVE: 1
BACK PAIN: 1
PSYCHIATRIC NEGATIVE: 1
RESPIRATORY NEGATIVE: 1

## 2021-05-15 ASSESSMENT — PATIENT HEALTH QUESTIONNAIRE - PHQ9
1. LITTLE INTEREST OR PLEASURE IN DOING THINGS: NOT AT ALL
2. FEELING DOWN, DEPRESSED, IRRITABLE, OR HOPELESS: NOT AT ALL
SUM OF ALL RESPONSES TO PHQ9 QUESTIONS 1 AND 2: 0

## 2021-05-15 NOTE — ASSESSMENT & PLAN NOTE
Reporting 2-day history of straining on urination and difficulty urinating  No LOPEZ on chemistry  Bladder scan with 300 mL, patient voided 100 mils  Start Flomax  Pending UA  Labs on a.m.

## 2021-05-15 NOTE — ED NOTES
Report given to ANA. Pt to be transferred to [facility] for further evaluations and treatment. Pt verbalizes understanding and is accepting of this plan.

## 2021-05-15 NOTE — ED TRIAGE NOTES
"Pt was seen in our department the 5 th of this month and evaluated/treated for right side back pain referred to his leg.  He was eventually discharge with a diagnosis of possible sciatica. He just completed his Medrol pack.  He returns today with unresolved pain, and additionally he reports urinary retention for the past 2 days.   Chief Complaint   Patient presents with   • Low Back Pain   • Urinary Retention   • Leg Pain     /98   Pulse 99   Temp 36.4 °C (97.5 °F) (Temporal)   Resp 20   Ht 1.803 m (5' 11\")   Wt 96 kg (211 lb 10.3 oz)   SpO2 98%   BMI 29.52 kg/m²      "

## 2021-05-15 NOTE — PROGRESS NOTES
2 RN Skin Check     Generalized redness (sunburn)   All skin intact.   No signs of skin breakdown over bony prominences.     PIV and NC in place. Skin under devices assessed, no signs of skin breakdown.

## 2021-05-15 NOTE — ASSESSMENT & PLAN NOTE
1 week history of worsening back pain radiating to right leg  Patient scheduled for MRI as outpatient but using excruciating pain  Likely musculoskeletal, disc herniation not rule out  Admit to observation for pain management and lumbar MRI  Regular diet  Scheduled Tylenol 3 times daily  As needed Dilaudid  Lumbar MRI on a.m.  PT

## 2021-05-15 NOTE — CARE PLAN
Problem: Communication  Goal: The ability to communicate needs accurately and effectively will improve  Outcome: Progressing     Problem: Respiratory  Goal: Patient will achieve/maintain optimum respiratory ventilation and gas exchange  Outcome: Progressing     Problem: Urinary Elimination  Goal: Establish and maintain regular urinary output  Outcome: Progressing     Problem: Mobility  Goal: Patient's capacity to carry out activities will improve  Outcome: Progressing     The patient is Stable - Low risk of patient condition declining or worsening    Shift Goals  Clinical Goals:  (pain control )    Progress made toward(s) clinical / shift goals: Pt educated on 1-10 pain scale. Pt able to communicate pain to healthcare team appropriately. Medicated per MAR. Educated on non-pharmacological pain control such as cold pack and repositioning.

## 2021-05-15 NOTE — ED NOTES
Med Rec complete per Pt at bedside w/family present.  Allergies reviewed.  No oral ABX in the last 14 days.

## 2021-05-15 NOTE — H&P
Hospital Medicine History & Physical Note    Date of Service  5/15/2021    Primary Care Physician  Ray Mcdonnell D.O.    Consultants  None    Code Status  Full Code    Chief Complaint  Chief Complaint   Patient presents with   • Urinary Retention     Dribbling urine starting today   • Low Back Pain     Right side starting Wed.   • Flank Pain     Right flank       History of Presenting Illness  46 y.o. male with a past medical history of reflex sympathetic dystrophy presents emergency department with a 1 week history of worsening right-sided back pain.  Patient has been having the back pain that radiates to his right leg.  He has been following with his primary care provider for such for which she was started on a Medrol Dosepak which helped with his pain.  Patient associating his pain with urinary retention the started 2 days ago..  Patient reporting urinary tenesmus and having to strain to urinate.  Patient denying weakness, numbness, nausea, vomiting, abdominal pain, history of IV drug use, orthostatic changes or loss of consciousness. At the emergency department, stable vital signs and patient saturating well room air.  No leukocytosis or anemia on CBC.  Chemistry without gross normalities.  CRP normal.  Pending bladder scan. Patient was admitted for back pain work-up and management.    Review of Systems  Review of Systems   Constitutional: Negative.    HENT: Negative.    Eyes: Negative.    Respiratory: Negative.    Cardiovascular: Negative.    Gastrointestinal: Negative.    Genitourinary: Negative.    Musculoskeletal: Positive for back pain and joint pain.   Skin: Negative.    Neurological: Negative.    Endo/Heme/Allergies: Negative.    Psychiatric/Behavioral: Negative.        Past Medical History   has a past medical history of CRPS (complex regional pain syndrome type I) (2013) and Pain (2/18/15).    Surgical History   has a past surgical history that includes pr inject nerv blck,stellate ganglion  (11/26/2014); pr inject nerv blck,stellate ganglion (12/3/2014); pr inject nerv blck,stellate ganglion (12/10/2014); pr percut implnt neuroelect,epidural (2/4/2015); pr percut implnt neuroelect,epidural (2/4/2015); spinal cord stimulator (2/19/2015); gastroscopy with biopsy (6/22/2013); and other orthopedic surgery.     Family History  family history includes Cancer in an other family member; Hypertension in an other family member.     Social History   reports that he has been smoking cigarettes. He has been smoking about 0.50 packs per day. He has never used smokeless tobacco. He reports previous alcohol use. He reports that he does not use drugs.    Allergies  Allergies   Allergen Reactions   • Morphine Swelling     Redness and itching   • Morphine Sulfate [Morphine]      unknown       Medications  Prior to Admission Medications   Prescriptions Last Dose Informant Patient Reported? Taking?   Lido-Capsaicin-Men-Methyl Sal (MEDI-PATCH-LIDOCAINE EX)   Yes No   Sig: by Apply externally route.   cyclobenzaprine (FLEXERIL) 5 mg tablet   No No   Sig: Take 1-2 Tablets by mouth 3 times a day as needed.   ibuprofen (MOTRIN) 600 MG Tab   Yes No   Sig: Take 600 mg by mouth every 6 hours as needed.   oxycodone-acetaminophen (PERCOCET-10)  MG Tab   Yes No   Sig: Take 1-2 Tabs by mouth every four hours as needed for Severe Pain.      Facility-Administered Medications: None       Physical Exam  Temp:  [36.4 °C (97.5 °F)-36.6 °C (97.8 °F)] 36.6 °C (97.8 °F)  Pulse:  [75-99] 79  Resp:  [13-20] 13  BP: (107-144)/(59-99) 128/81  SpO2:  [93 %-98 %] 96 %    Physical Exam  Constitutional:       General: He is not in acute distress.     Appearance: Normal appearance. He is not ill-appearing.   HENT:      Head: Normocephalic and atraumatic.      Nose: Nose normal. No congestion.      Mouth/Throat:      Mouth: Mucous membranes are moist.   Eyes:      Extraocular Movements: Extraocular movements intact.      Pupils: Pupils are equal,  round, and reactive to light.   Cardiovascular:      Rate and Rhythm: Normal rate and regular rhythm.      Pulses: Normal pulses.      Heart sounds: Normal heart sounds.   Pulmonary:      Effort: Pulmonary effort is normal.      Breath sounds: Normal breath sounds.   Abdominal:      General: Bowel sounds are normal. There is no distension.      Palpations: Abdomen is soft.      Tenderness: There is no abdominal tenderness. There is no guarding or rebound.   Musculoskeletal:         General: Tenderness present. No swelling. Normal range of motion.      Cervical back: Normal range of motion and neck supple.      Comments: Right lumbar spine with no tenderness to palpation, erythema or swelling noted   Skin:     General: Skin is warm.      Coloration: Skin is not jaundiced.   Neurological:      General: No focal deficit present.      Mental Status: He is alert and oriented to person, place, and time. Mental status is at baseline.      Cranial Nerves: No cranial nerve deficit.   Psychiatric:         Mood and Affect: Mood normal.         Behavior: Behavior normal.         Thought Content: Thought content normal.         Judgment: Judgment normal.         Laboratory:  Recent Labs     05/14/21  2303   WBC 4.4*   RBC 4.93   HEMOGLOBIN 16.4   HEMATOCRIT 47.9   MCV 97.2   MCH 33.3*   MCHC 34.2   RDW 44.1   PLATELETCT 175   MPV 9.3     Recent Labs     05/14/21  2303   SODIUM 139   POTASSIUM 4.3   CHLORIDE 106   CO2 24   GLUCOSE 104*   BUN 17   CREATININE 0.79   CALCIUM 9.0     Recent Labs     05/14/21  2303   GLUCOSE 104*         No results for input(s): NTPROBNP in the last 72 hours.      No results for input(s): TROPONINT in the last 72 hours.    Imaging:  MR-LUMBAR SPINE-W/O    (Results Pending)         Assessment/Plan:  I anticipate this patient is appropriate for observation status at this time.    * Back pain- (present on admission)  Assessment & Plan  1 week history of worsening back pain radiating to right leg  Patient  scheduled for MRI as outpatient but using excruciating pain  Admit to observation for pain management and lumbar MRI  Regular diet  Scheduled Tylenol 3 times daily  As needed Dilaudid  Lumbar MRI on a.m.  PT    Dysuria- (present on admission)  Assessment & Plan  Reporting 2-day history of straining on urination and difficulty urinating  No LOPEZ on chemistry  Bladder scan with 300 mL, patient voided 100 mils  Start Flomax  Pending UA  Labs on a.m.    DVT prophylaxis Lovenox

## 2021-05-15 NOTE — ED PROVIDER NOTES
ED Provider Note  CHIEF COMPLAINT  Chief Complaint   Patient presents with   • Low Back Pain   • Urinary Retention   • Leg Pain       HPI  Jarod Fields is a 46 y.o. male who presents with low back pain.  Pain is mostly on the right side.  Pain radiates down his right leg.  He was seen here on May 5, 2021.  He also followed up with his primary care doctor.  They are trying to set up an outpatient MRI.  He has been placed on a Medrol Dosepak.  He states the Medrol Dosepak did not help.  Over the last 2 days he has noted that he is having difficulty urinating.  He feels like he has to push really hard and cannot totally empty his bladder.  He denies any numbness when he wipes.  He also is having difficulty ambulating on his leg.  He is unclear if this is due to weakness or pain.  He has pain that radiates down his right leg mostly into his right thigh.  Is also got some tingling in his right thigh.  Patient denies a history of diabetes, cancer, HIV or IV drug abuse.  He has not had any fevers.  He denies any abdominal pain.  No nausea or vomiting.  No history of trauma to his back.  Patient has a history of reflex sympathetic dystrophy and does have an implanted neurostimulator on the left.  Patient states some pain is alleviated when he lies on his left side with a pillow between his legs.    REVIEW OF SYSTEMS  Patient denies history of cancer, fever, trauma. See HPI for further details. All other systems are negative.     PAST MEDICAL HISTORY   has a past medical history of Pain (2/18/15).    FAMILY HISTORY  Family History   Problem Relation Age of Onset   • Hypertension Other    • Cancer Other        SOCIAL HISTORY  Social History     Tobacco Use   • Smoking status: Current Every Day Smoker     Packs/day: 0.50   • Smokeless tobacco: Never Used   • Tobacco comment: 1/2 ppd since 15yo, on chantix   Vaping Use   • Vaping Use: Never used   Substance and Sexual Activity   • Alcohol use: Yes     Comment: weekly   •  "Drug use: No     Comment: denies    • Sexual activity: Not on file       SURGICAL HISTORY   has a past surgical history that includes inject nerv blck,stellate ganglion (11/26/2014); inject nerv blck,stellate ganglion (12/3/2014); inject nerv blck,stellate ganglion (12/10/2014); percut implnt neuroelect,epidural (2/4/2015); percut implnt neuroelect,epidural (2/4/2015); spinal cord stimulator (2/19/2015); gastroscopy with biopsy (6/22/2013); and other orthopedic surgery.    CURRENT MEDICATIONS  Home Medications    **Home medications have not yet been reviewed for this encounter**         ALLERGIES  Allergies   Allergen Reactions   • Morphine Swelling     Redness and itching   • Morphine Sulfate [Morphine]      unknown       PHYSICAL EXAM  VITAL SIGNS: /98   Pulse 99   Temp 36.4 °C (97.5 °F) (Temporal)   Resp 20   Ht 1.803 m (5' 11\")   Wt 96 kg (211 lb 10.3 oz)   SpO2 98%   BMI 29.52 kg/m²   Constitutional: Well developed, Well nourished, moderate acute distress, in pain, Non-toxic appearance.   HENT: No facial asymmetry, no midline cervical tenderness.  Cardiovascular: Normal heart rate, Normal rhythm  Thorax & Lungs: Normal breath sounds, No respiratory distress, No chest tenderness.   Abdomen: Bowel sounds normal, Soft, No tenderness, No pulsatile masses. no saddle anesthesia noted  Skin: Warm, Dry, No erythema, No rash.   Back: right lower paraspinal tenderness to palpation, no step-offs, no gross deformity, positive straight leg raise on the right, diminished right patellar reflex compared to the left  Extremities: Intact distal pulses, No edema, No tenderness, normal dorsiflexion of great toe bilaterally, intact sensation to light touch, objective tingling to the right thigh  Neurologic: Alert & oriented x 3, Normal motor function, Normal sensory function, No focal deficits noted. Normal gait.  Psych: alert, appropriate    RADIOLOGY/PROCEDURES      COURSE & MEDICAL DECISION MAKING  Pertinent Labs & " Imaging studies reviewed. (See chart for details)    Patient presents to the emergency department with persistent right sided low back pain.  He was seen in the ER about a week ago and a CT scan was unremarkable.  He followed up with his regular doctor but continues to have pain.  He presented tonight because of worsening pain and urinary retention.  He has sensation in his perineal area.  Bladder scanned was 164 and he urinated just prior to coming to the ER.  Patient has not had any fevers and I doubt an infectious etiology.  No history of recent trauma.  It is difficult to determine if his problem ambulating is secondary to pain versus actual weakness in the leg.  He has normal dorsiflexion of the foot and great toe but does have a diminished reflex on that right side.    We do not have MRI at [facility] after 730 tonight.  I do feel emergent MRI is indicated due to his urinary retention and persistent pain and difficulty ambulating.  Concerned about possible acute cord compression.    I have spoke with the transfer center and discussed the case with Dr. Dumont who accepts the patient.  I discussed transfer with the patient who agrees.  He requests to go by ambulance.      FINAL IMPRESSION  1. Acute right-sided low back pain with right-sided sciatica     2. Urinary retention           Electronically signed by: Jazmin Doty M.D., 5/14/2021 7:51 PM

## 2021-05-15 NOTE — PROGRESS NOTES
Assessment complete.  AA&Ox4.   SpO2 >90% on 0.5L via NC. Denies SOB.  Denies any pain.    Tolerating regular diet. Denies N/V.  Voiding without difficulty per pt. UA sent down to lab.  LBM PTA.   Pt upself.   All needs met at this time. Call light within reach. Pt calls appropriately.

## 2021-05-15 NOTE — PROGRESS NOTES
1608 -  Pt has neuromodulation device that is limited to head-only MRI scans per the  guidelines ( Boxed ).    RN notified via phone.

## 2021-05-15 NOTE — ED TRIAGE NOTES
"Chief Complaint   Patient presents with   • Urinary Retention     Dribbling urine starting today   • Low Back Pain     Right side starting Wed.   • Flank Pain     Right flank       Transfer from [facility] for LBP on right side radiating to right thigh when bearing weight starting Wednesday. Today unable to fully empty bladder, dribbling and increased pain. Hx of CRPS with stimulator implanted in 2015, reports regular f/u care.    Transferred for MRI    /93   Pulse 89   Temp 36.6 °C (97.8 °F) (Temporal)   Resp 18   Ht 1.778 m (5' 10\")   Wt 95.3 kg (210 lb)   SpO2 94%   BMI 30.13 kg/m²       "

## 2021-05-15 NOTE — PROGRESS NOTES
Pt transported to unit via [facility] with transport. Pt ambulated to bed from [facility]. Belongings at bedside.     Received report from previous RN  Assessment complete.  A&O x 4. Patient calls appropriately.  Patient ambulates with SBA  Patient has 8/10 pain. Pain managed with prescribed medications.  Denies N&V. Tolerating regular diet.  + void, + flatus, last BM PTA.   Patient denies SOB.    Patient laying in bed. Review plan with of care with patient. Call light and personal belongings with in reach. Hourly rounding in place. All needs met at this time.

## 2021-05-15 NOTE — ED NOTES
Pt medicated for 8/10 pain. Covid swab collected and sent to lab. Traction contacted for bladder scan. Call light within reach. Will continue to monitor

## 2021-05-15 NOTE — ED PROVIDER NOTES
ED Provider Note        Primary care provider: Ray Mcdonnell D.O.    I verified that the patient was wearing a mask and I was wearing appropriate PPE every time I entered the room. The patient's mask was on the patient at all times during my encounter except for a brief view of the oropharynx.      CHIEF COMPLAINT  Chief Complaint   Patient presents with   • Urinary Retention     Dribbling urine starting today   • Low Back Pain     Right side starting Wed.   • Flank Pain     Right flank       HPI  Jarod Fields is a 46 y.o. male who presents to the Emergency Department with chief complaint of back pain which has been present for about a week, patient was seen in regular his PCP and also a neurologist which already ordered an MRI but apparently cannot be done until next week.  Patient went to [facility] ER department earlier this night due to worsening back pain, having difficulties walking, patient also endorsed urinary difficulties, he is stated that he has been urinating very frequently and is straining very hard to get his urine out, he has not been able to have a normal void since last night, he mentions that his urine output is probably about 30 to 50 cc so he urinates.  Patient denied fever, chills, night sweats, flank pain, suprapubic pain, dysuria, hematuria, or pyuria.  Patient also denied bowel incontinence, numbness or tingling in his lower extremities, perineal or genital area anesthesia.  He also denies any history of trauma or chronic back pain.  Patient has a CT renal on May 5th showing no evidence of renal stones or hydronephrosis, no evidence of inflammatory changes. He has a implanted intraspinal pain device. Also evidence of sigmoid diverticulosis.    REVIEW OF SYSTEMS.  CNS:Patient denied headaches, vision changes, confusion or altered mentation.  Resp: Denied SOB, wheezing.  Card: No chest pain, palpitations, orthopnea or lightheadedness.  : Patient stated difficulties urinating,  dribbling, but denied dysuria, hematuria or pyuria.  GI: Denied abdominal pain, nauseas, vomiting or changes in bowel habits.  EXT: Patient has had back pain with movement, but denied numbness, tingling or muscle weakness.  ENDOCRINE: Denied excessive thirst, polyphagia, polydipsia, or recent weight loss.  OPHTHALMO: Patient in night vision changes, eye pain, eye discharge, vision loss, or pain with eye movement.  PSYCH: Denied hallucinations, suicidal ideation, thoughts or hurting himself or others.  DERM: Denies rashes, pruritus or suspicious lesions.    PAST MEDICAL HISTORY   has a past medical history of CRPS (complex regional pain syndrome type I) (2013) and Pain (2/18/15).    SURGICAL HISTORY   has a past surgical history that includes inject nerv blck,stellate ganglion (11/26/2014); inject nerv blck,stellate ganglion (12/3/2014); inject nerv blck,stellate ganglion (12/10/2014); percut implnt neuroelect,epidural (2/4/2015); percut implnt neuroelect,epidural (2/4/2015); spinal cord stimulator (2/19/2015); gastroscopy with biopsy (6/22/2013); and other orthopedic surgery.    SOCIAL HISTORY  Social History     Tobacco Use   • Smoking status: Current Every Day Smoker     Packs/day: 0.50     Types: Cigarettes   • Smokeless tobacco: Never Used   • Tobacco comment: 1/2 ppd since 15yo, on chantix   Vaping Use   • Vaping Use: Never used   Substance Use Topics   • Alcohol use: Not Currently   • Drug use: No     Comment: denies       Social History     Substance and Sexual Activity   Drug Use No    Comment: denies        FAMILY HISTORY  Non-Contributory    CURRENT MEDICATIONS  Home Medications     Reviewed by Rachelle Dean R.N. (Registered Nurse) on 05/14/21 at 9452  Med List Status: <None>   Medication Last Dose Status   cyclobenzaprine (FLEXERIL) 5 mg tablet  Active   ibuprofen (MOTRIN) 600 MG Tab  Active   Lido-Capsaicin-Men-Methyl Sal (MEDI-PATCH-LIDOCAINE EX)  Active   oxycodone-acetaminophen (PERCOCET-10)  " MG Tab  Active                ALLERGIES  Allergies   Allergen Reactions   • Morphine Swelling     Redness and itching   • Morphine Sulfate [Morphine]      unknown       PHYSICAL EXAM  VITAL SIGNS: /93   Pulse 89   Temp 36.6 °C (97.8 °F) (Temporal)   Resp 18   Ht 1.778 m (5' 10\")   Wt 95.3 kg (210 lb)   SpO2 94%   BMI 30.13 kg/m²   Pulse ox interpretation: I interpret this pulse ox as normal.  Constitutional: Alert and oriented x 3, mild acute distress due to back pain.  HEENT: Atraumatic normocephalic, pupils are equal round, extraocular movements are intact. The nares is clear, external ears are normal, mouth shows moist mucous membranes  Neck: Supple, no JVD no tracheal deviation  Cardiovascular: Regular rate and rhythm no murmur rub or gallop   Thorax & Lungs: No respiratory distress, no wheezes rales or rhonchi, No chest tenderness.   GI: Soft nontender nondistended positive bowel sounds, no peritoneal signs, no hepato/splenomegaly  : No signs of trauma, normal sensation in his perineum and genital area.  Rectal: Deferred  Skin: Warm dry no obvious acute rash or lesion  Musculoskeletal: Back pain refer to his right lower lumbar region, pain with movement but not with palpation, sensation is intact in his back and lower extremities, muscle strength 5/5, no numbness or tingling in his extremities, patient able to move around in bed but obvious pain is noticed with movement which is referred on into his lower lumbar region in his right side.  Neurologic: Cranial nerves III through XII are grossly intact, no sensory deficit, no cerebellar dysfunction   Psychiatric: Appropriate affect for situation at this time.      DIAGNOSTIC STUDIES / PROCEDURES  LABS      Results for orders placed or performed during the hospital encounter of 03/24/21   Comp Metabolic Panel   Result Value Ref Range    Sodium 138 135 - 145 mmol/L    Potassium 4.3 3.6 - 5.5 mmol/L    Chloride 103 96 - 112 mmol/L    Co2 27 20 - " 33 mmol/L    Anion Gap 8.0 7.0 - 16.0    Glucose 92 65 - 99 mg/dL    Bun 18 8 - 22 mg/dL    Creatinine 0.83 0.50 - 1.40 mg/dL    Calcium 9.7 8.5 - 10.5 mg/dL    AST(SGOT) 39 12 - 45 U/L    ALT(SGPT) 77 (H) 2 - 50 U/L    Alkaline Phosphatase 72 30 - 99 U/L    Total Bilirubin 0.7 0.1 - 1.5 mg/dL    Albumin 4.7 3.2 - 4.9 g/dL    Total Protein 7.8 6.0 - 8.2 g/dL    Globulin 3.1 1.9 - 3.5 g/dL    A-G Ratio 1.5 g/dL   Lipid Profile   Result Value Ref Range    Cholesterol,Tot 193 100 - 199 mg/dL    Triglycerides 102 0 - 149 mg/dL    HDL 40 >=40 mg/dL     (H) <100 mg/dL   FASTING STATUS   Result Value Ref Range    Fasting Status Fasting    ESTIMATED GFR   Result Value Ref Range    GFR If African American >60 >60 mL/min/1.73 m 2    GFR If Non African American >60 >60 mL/min/1.73 m 2       All labs reviewed by me.      RADIOLOGY  No orders to display     The radiologist's interpretation of all radiological studies have been reviewed by me.    COURSE & MEDICAL DECISION MAKING  Pertinent Labs & Imaging studies reviewed. (See chart for details)    10:03 PM - Patient seen and examined at bedside.     Jarod is a 46-year-old male who presented to the emergency department for worsening back pain, currently following up with neurology he is scheduled to get an MRI outpatient next week.  Patient presented to the emergency department at [facility] for concerning new symptoms and worsening back pain, he stated that for the past 24 hours he has been unable to urinate properly, urinary dribbling, urinating very frequently may be every  hour and urinating very small amounts of urine he stated a probably less than 50 cc at a time.  Upon arrival patient in mild acute distress secondary to pain, was given Dilaudid 0.5 mg IV which improved his pain.  Physical examination patient had pain referred to his right lower lumbar area which worsens with movement, no numbness or tingling in his lower extremity, normal muscle strength, normal  "sensation, perineal area and genital area normal sensation, patient denied any fecal incontinence.  Patient spoke with his neurologist and they were concerned about his symptoms and was sent to the hospital to get evaluated.    Medical Decision Making:   Patient will be admitted to the hospital for pain control,  PT OT and possible imaging.  Pain more likely to be related to radiculopathy, muscle or ligament involvement, very unlikely cord injury.    /93   Pulse 89   Temp 36.6 °C (97.8 °F) (Temporal)   Resp 18   Ht 1.778 m (5' 10\")   Wt 95.3 kg (210 lb)   SpO2 94%   BMI 30.13 kg/m²     No follow-up provider specified.    New Prescriptions    No medications on file       FINAL IMPRESSION  1.  Intractable back pain      This dictation has been created using voice recognition software and/or scribes. The accuracy of the dictation is limited by the abilities of the software and the expertise of the scribes. I expect there may be some errors of grammar and possibly content. I made every attempt to manually correct the errors within my dictation. However, errors related to voice recognition software and/or scribes may still exist and should be interpreted within the appropriate context.            "

## 2021-05-15 NOTE — ED PROVIDER NOTES
ED Provider Note        Primary care provider: Ray Mcdonnell D.O.    I verified that the patient was wearing a mask and I was wearing appropriate PPE every time I entered the room. The patient's mask was on the patient at all times during my encounter except for a brief view of the oropharynx.      CHIEF COMPLAINT  Chief Complaint   Patient presents with   • Urinary Retention     Dribbling urine starting today   • Low Back Pain     Right side starting Wed.   • Flank Pain     Right flank       HPI  Jarod Fields is a 46 y.o. male who presents to the Emergency Department with chief complaint of back pain which has been present for about a week, patient was seen in regular his PCP and also a neurologist which already ordered an MRI but apparently cannot be done until next week.  Patient went to [facility] ER department earlier this night due to worsening back pain, having difficulties walking, patient also endorsed urinary difficulties, he is stated that he has been urinating very frequently and is straining very hard to get his urine out, he has not been able to have a normal void since last night, he mentions that his urine output is probably about 30 to 50 cc so he urinates.  Patient denied fever, chills, night sweats, flank pain, suprapubic pain, dysuria, hematuria, or pyuria.  Patient also denied bowel incontinence, numbness or tingling in his lower extremities, perineal or genital area anesthesia.  He also denies any history of trauma or chronic back pain.  Patient has a CT renal on May 5th showing no evidence of renal stones or hydronephrosis, no evidence of inflammatory changes. He has a implanted intraspinal pain device. Also evidence of sigmoid diverticulosis.    REVIEW OF SYSTEMS.  CNS:Patient denied headaches, vision changes, confusion or altered mentation.  Resp: Denied SOB, wheezing.  Card: No chest pain, palpitations, orthopnea or lightheadedness.  : Patient stated difficulties urinating,  dribbling, but denied dysuria, hematuria or pyuria.  GI: Denied abdominal pain, nauseas, vomiting or changes in bowel habits.  EXT: Patient has had back pain with movement, but denied numbness, tingling or muscle weakness.  ENDOCRINE: Denied excessive thirst, polyphagia, polydipsia, or recent weight loss.  OPHTHALMO: Patient in night vision changes, eye pain, eye discharge, vision loss, or pain with eye movement.  PSYCH: Denied hallucinations, suicidal ideation, thoughts or hurting himself or others.  DERM: Denies rashes, pruritus or suspicious lesions.    PAST MEDICAL HISTORY   has a past medical history of CRPS (complex regional pain syndrome type I) (2013) and Pain (2/18/15).    SURGICAL HISTORY   has a past surgical history that includes inject nerv blck,stellate ganglion (11/26/2014); inject nerv blck,stellate ganglion (12/3/2014); inject nerv blck,stellate ganglion (12/10/2014); percut implnt neuroelect,epidural (2/4/2015); percut implnt neuroelect,epidural (2/4/2015); spinal cord stimulator (2/19/2015); gastroscopy with biopsy (6/22/2013); and other orthopedic surgery.    SOCIAL HISTORY  Social History     Tobacco Use   • Smoking status: Current Every Day Smoker     Packs/day: 0.50     Types: Cigarettes   • Smokeless tobacco: Never Used   • Tobacco comment: 1/2 ppd since 13yo, on chantix   Vaping Use   • Vaping Use: Never used   Substance Use Topics   • Alcohol use: Not Currently   • Drug use: No     Comment: denies       Social History     Substance and Sexual Activity   Drug Use No    Comment: denies        FAMILY HISTORY  Non-Contributory    CURRENT MEDICATIONS  Home Medications     Reviewed by Rachelle Dean R.N. (Registered Nurse) on 05/14/21 at 8404  Med List Status: <None>   Medication Last Dose Status   cyclobenzaprine (FLEXERIL) 5 mg tablet  Active   ibuprofen (MOTRIN) 600 MG Tab  Active   Lido-Capsaicin-Men-Methyl Sal (MEDI-PATCH-LIDOCAINE EX)  Active   oxycodone-acetaminophen (PERCOCET-10)  " MG Tab  Active                ALLERGIES  Allergies   Allergen Reactions   • Morphine Swelling     Redness and itching   • Morphine Sulfate [Morphine]      unknown       PHYSICAL EXAM  VITAL SIGNS: /93   Pulse 89   Temp 36.6 °C (97.8 °F) (Temporal)   Resp 18   Ht 1.778 m (5' 10\")   Wt 95.3 kg (210 lb)   SpO2 94%   BMI 30.13 kg/m²   Pulse ox interpretation: I interpret this pulse ox as normal.  Constitutional: Alert and oriented x 3, mild acute distress due to back pain.  HEENT: Atraumatic normocephalic, pupils are equal round, extraocular movements are intact. The nares is clear, external ears are normal, mouth shows moist mucous membranes  Neck: Supple, no JVD no tracheal deviation  Cardiovascular: Regular rate and rhythm no murmur rub or gallop   Thorax & Lungs: No respiratory distress, no wheezes rales or rhonchi, No chest tenderness.   GI: Soft nontender nondistended positive bowel sounds, no peritoneal signs, no hepato/splenomegaly  : No signs of trauma, normal sensation in his perineum and genital area.  Rectal: Deferred  Skin: Warm dry no obvious acute rash or lesion  Musculoskeletal: Back pain refer to his right lower lumbar region, pain with movement but not with palpation, sensation is intact in his back and lower extremities, muscle strength 5/5, no numbness or tingling in his extremities, patient able to move around in bed but obvious pain is noticed with movement which is referred on into his lower lumbar region in his right side.  Neurologic: Cranial nerves III through XII are grossly intact, no sensory deficit, no cerebellar dysfunction   Psychiatric: Appropriate affect for situation at this time.      DIAGNOSTIC STUDIES / PROCEDURES  LABS      Results for orders placed or performed during the hospital encounter of 03/24/21   Comp Metabolic Panel   Result Value Ref Range    Sodium 138 135 - 145 mmol/L    Potassium 4.3 3.6 - 5.5 mmol/L    Chloride 103 96 - 112 mmol/L    Co2 27 20 - " "33 mmol/L    Anion Gap 8.0 7.0 - 16.0    Glucose 92 65 - 99 mg/dL    Bun 18 8 - 22 mg/dL    Creatinine 0.83 0.50 - 1.40 mg/dL    Calcium 9.7 8.5 - 10.5 mg/dL    AST(SGOT) 39 12 - 45 U/L    ALT(SGPT) 77 (H) 2 - 50 U/L    Alkaline Phosphatase 72 30 - 99 U/L    Total Bilirubin 0.7 0.1 - 1.5 mg/dL    Albumin 4.7 3.2 - 4.9 g/dL    Total Protein 7.8 6.0 - 8.2 g/dL    Globulin 3.1 1.9 - 3.5 g/dL    A-G Ratio 1.5 g/dL   Lipid Profile   Result Value Ref Range    Cholesterol,Tot 193 100 - 199 mg/dL    Triglycerides 102 0 - 149 mg/dL    HDL 40 >=40 mg/dL     (H) <100 mg/dL   FASTING STATUS   Result Value Ref Range    Fasting Status Fasting    ESTIMATED GFR   Result Value Ref Range    GFR If African American >60 >60 mL/min/1.73 m 2    GFR If Non African American >60 >60 mL/min/1.73 m 2       All labs reviewed by me.      RADIOLOGY  No orders to display     The radiologist's interpretation of all radiological studies have been reviewed by me.    COURSE & MEDICAL DECISION MAKING  Pertinent Labs & Imaging studies reviewed. (See chart for details)    10:03 PM - Patient seen and examined at bedside.   Jarod is a 46-year-old male who presented to the emergency department for worsening back pain, he has been having any urinary symptoms like dribbling and unable to empty his bladder completely for the past 24 hours.  He has been seen already by a neurologist who already ordered an MRI outpatient but due to worsening symptoms patient presented to the emergency department.  Lab work:  CBC CMP unremarkable, ESR normal limits.  Patient will be admitted to the hospital for PT OT      Medical Decision Making: ***    /93   Pulse 89   Temp 36.6 °C (97.8 °F) (Temporal)   Resp 18   Ht 1.778 m (5' 10\")   Wt 95.3 kg (210 lb)   SpO2 94%   BMI 30.13 kg/m²     No follow-up provider specified.    New Prescriptions    No medications on file       FINAL IMPRESSION  No diagnosis found. ***      This dictation has been created using " voice recognition software and/or scribes. The accuracy of the dictation is limited by the abilities of the software and the expertise of the scribes. I expect there may be some errors of grammar and possibly content. I made every attempt to manually correct the errors within my dictation. However, errors related to voice recognition software and/or scribes may still exist and should be interpreted within the appropriate context.

## 2021-05-15 NOTE — ED NOTES
Pt voided 50ml, sts that is all he could get out at this time. Traction contacted for post void scan, 208ml.

## 2021-05-15 NOTE — CARE PLAN
Problem: Communication  Goal: The ability to communicate needs accurately and effectively will improve  Outcome: Progressing     Problem: Respiratory  Goal: Patient will achieve/maintain optimum respiratory ventilation and gas exchange  Outcome: Progressing     Problem: Urinary Elimination  Goal: Establish and maintain regular urinary output  Outcome: Progressing     Problem: Mobility  Goal: Patient's capacity to carry out activities will improve  Outcome: Progressing     Problem: Pain - Standard  Goal: Alleviation of pain or a reduction in pain to the patient’s comfort goal  Outcome: Progressing     Problem: Knowledge Deficit - Standard  Goal: Patient and family/care givers will demonstrate understanding of plan of care, disease process/condition, diagnostic tests and medications  Outcome: Progressing     The patient is Stable - Low risk of patient condition declining or worsening    Shift Goals  Clinical Goals: pain control, MRI    Progress made toward(s) clinical / shift goals:  Pain. MRI unable to be completed inpatient per MRI tech due to impanted neurotransmitter, MD aware.     Patient is not progressing towards the following goals:

## 2021-05-16 ENCOUNTER — PHARMACY VISIT (OUTPATIENT)
Dept: PHARMACY | Facility: MEDICAL CENTER | Age: 46
End: 2021-05-16
Payer: COMMERCIAL

## 2021-05-16 VITALS
OXYGEN SATURATION: 92 % | DIASTOLIC BLOOD PRESSURE: 80 MMHG | HEIGHT: 70 IN | TEMPERATURE: 99 F | RESPIRATION RATE: 18 BRPM | HEART RATE: 70 BPM | WEIGHT: 214.95 LBS | SYSTOLIC BLOOD PRESSURE: 119 MMHG | BODY MASS INDEX: 30.77 KG/M2

## 2021-05-16 PROCEDURE — 96376 TX/PRO/DX INJ SAME DRUG ADON: CPT

## 2021-05-16 PROCEDURE — 700111 HCHG RX REV CODE 636 W/ 250 OVERRIDE (IP): Performed by: STUDENT IN AN ORGANIZED HEALTH CARE EDUCATION/TRAINING PROGRAM

## 2021-05-16 PROCEDURE — 700102 HCHG RX REV CODE 250 W/ 637 OVERRIDE(OP): Performed by: STUDENT IN AN ORGANIZED HEALTH CARE EDUCATION/TRAINING PROGRAM

## 2021-05-16 PROCEDURE — 96372 THER/PROPH/DIAG INJ SC/IM: CPT

## 2021-05-16 PROCEDURE — A9270 NON-COVERED ITEM OR SERVICE: HCPCS | Performed by: STUDENT IN AN ORGANIZED HEALTH CARE EDUCATION/TRAINING PROGRAM

## 2021-05-16 PROCEDURE — G0378 HOSPITAL OBSERVATION PER HR: HCPCS

## 2021-05-16 PROCEDURE — RXMED WILLOW AMBULATORY MEDICATION CHARGE: Performed by: STUDENT IN AN ORGANIZED HEALTH CARE EDUCATION/TRAINING PROGRAM

## 2021-05-16 PROCEDURE — 99217 PR OBSERVATION CARE DISCHARGE: CPT | Performed by: STUDENT IN AN ORGANIZED HEALTH CARE EDUCATION/TRAINING PROGRAM

## 2021-05-16 RX ORDER — HYDROMORPHONE HYDROCHLORIDE 2 MG/1
2-4 TABLET ORAL EVERY 6 HOURS PRN
Qty: 16 TABLET | Refills: 0 | Status: SHIPPED | OUTPATIENT
Start: 2021-05-16 | End: 2021-05-19

## 2021-05-16 RX ORDER — OXYCODONE HYDROCHLORIDE 10 MG/1
10 TABLET ORAL
Status: DISCONTINUED | OUTPATIENT
Start: 2021-05-16 | End: 2021-05-16 | Stop reason: HOSPADM

## 2021-05-16 RX ADMIN — FENTANYL CITRATE 100 MCG: 50 INJECTION, SOLUTION INTRAMUSCULAR; INTRAVENOUS at 11:25

## 2021-05-16 RX ADMIN — ACETAMINOPHEN 1000 MG: 500 TABLET ORAL at 15:01

## 2021-05-16 RX ADMIN — OXYCODONE HYDROCHLORIDE 15 MG: 10 TABLET ORAL at 13:06

## 2021-05-16 RX ADMIN — OXYCODONE HYDROCHLORIDE 10 MG: 10 TABLET ORAL at 01:26

## 2021-05-16 RX ADMIN — TAMSULOSIN HYDROCHLORIDE 0.4 MG: 0.4 CAPSULE ORAL at 09:26

## 2021-05-16 RX ADMIN — ENOXAPARIN SODIUM 40 MG: 40 INJECTION SUBCUTANEOUS at 05:14

## 2021-05-16 RX ADMIN — FENTANYL CITRATE 50 MCG: 50 INJECTION, SOLUTION INTRAMUSCULAR; INTRAVENOUS at 03:28

## 2021-05-16 RX ADMIN — OXYCODONE HYDROCHLORIDE 10 MG: 10 TABLET ORAL at 05:16

## 2021-05-16 RX ADMIN — OXYCODONE 5 MG: 5 TABLET ORAL at 09:26

## 2021-05-16 RX ADMIN — CARISOPRODOL 350 MG: 350 TABLET ORAL at 15:01

## 2021-05-16 RX ADMIN — ACETAMINOPHEN 1000 MG: 500 TABLET ORAL at 09:26

## 2021-05-16 RX ADMIN — DOCUSATE SODIUM 50 MG AND SENNOSIDES 8.6 MG 2 TABLET: 8.6; 5 TABLET, FILM COATED ORAL at 05:14

## 2021-05-16 ASSESSMENT — PAIN DESCRIPTION - PAIN TYPE
TYPE: ACUTE PAIN

## 2021-05-16 NOTE — DISCHARGE INSTRUCTIONS
Discharge Instructions    Discharged to home by car with relative. Discharged via wheelchair, hospital escort: Yes.  Special equipment needed: Not Applicable    Be sure to schedule a follow-up appointment with your primary care doctor or any specialists as instructed.     Discharge Plan:        I understand that a diet low in cholesterol, fat, and sodium is recommended for good health. Unless I have been given specific instructions below for another diet, I accept this instruction as my diet prescription.   Other diet: as tolerated    Special Instructions:    Patient will receive prescription for hydromorphone 4 mg to be taken 1 to 2 tablets every 6 hours as needed for total of 2 days.  Additionally patient will go to Dr. Ghassan Coy's office morning of 5/17/2021 for epidural injection.  Furthermore patient was also told that he needed to be nothing by mouth 2 hours prior to procedure.  Additionally patient was also explained that he must not take NSAIDs 24 hours prior to procedure.     · Is patient discharged on Warfarin / Coumadin?   No     Depression / Suicide Risk    As you are discharged from this [facility] facility, it is important to learn how to keep safe from harming yourself.    Recognize the warning signs:  · Abrupt changes in personality, positive or negative- including increase in energy   · Giving away possessions  · Change in eating patterns- significant weight changes-  positive or negative  · Change in sleeping patterns- unable to sleep or sleeping all the time   · Unwillingness or inability to communicate  · Depression  · Unusual sadness, discouragement and loneliness  · Talk of wanting to die  · Neglect of personal appearance   · Rebelliousness- reckless behavior  · Withdrawal from people/activities they love  · Confusion- inability to concentrate     If you or a loved one observes any of these behaviors or has concerns about self-harm, here's what you can do:  · Talk about it- your feelings  and reasons for harming yourself  · Remove any means that you might use to hurt yourself (examples: pills, rope, extension cords, firearm)  · Get professional help from the community (Mental Health, Substance Abuse, psychological counseling)  · Do not be alone:Call your Safe Contact- someone whom you trust who will be there for you.  · Call your local CRISIS HOTLINE 194-2864 or 860-469-8638  · Call your local Children's Mobile Crisis Response Team [place] (729) 851-8522 or [employer]  · Call the toll free National Suicide Prevention Hotlines   · National Suicide Prevention Lifeline 099-634-EUWN (5806)  · [employer] Line Network 800-SUICIDE (764-2538)          Complex Regional Pain Syndrome  Complex regional pain syndrome (CRPS) is a nerve disorder that causes long-term (chronic) pain. This is usually in a hand, arm, foot, or leg. CRPS usually occurs after an injury or trauma, such as a fracture or sprain.  There are two types of CRPS:  · Type 1. This type occurs after an injury with no known damage to a nerve.  · Type 2. This type occurs after an injury that damages a nerve.  There are three stages of the condition:  · Stage 1. This stage, called the acute stage, may last for up to 3 months.  · Stage 2. This stage, called the dystrophic stage, may last for 3-12 months.  · Stage 3. This stage, called the atrophic stage, may start after one year.  CRPS ranges from mild to severe. For most people, CRPS is mild and recovery happens over time. For others, CRPS lasts for a very long time and makes everyday tasks hard to do.  What are the causes?  The exact cause of this condition is not known. It is usually triggered by an injury.  What increases the risk?  You are more likely to develop this condition if:  · You are female.  · You have any of the following injuries:  ? A wrist fracture that involves a lower arm bone (distal radius fracture).  ? Ankle dislocation or fracture.  ? A long surgery  time.  ? Possible nerve injury during surgery.  What are the signs or symptoms?  Signs and symptoms in the affected hand, arm, foot, or leg are different for each stage.  Signs and symptoms of stage 1 include:  · Burning pain.  · A prickling, tingling feeling (pins and needles sensation).  · Extremely sensitive skin.  · Swelling.  · Joint stiffness.  · Warmth and redness.  · Excessive sweating.  · Hair and nail growth that is faster than normal.  Signs and symptoms of stage 2 include:  · Spreading of pain to the whole arm or leg.  · Increased skin sensitivity.  · Increased swelling and stiffness.  · Coolness of the skin.  · Blue discoloration of skin.  · Loss of skin wrinkles.  · Brittle fingernails.  Signs and symptoms of stage 3 include:  · Pain that spreads to other areas of the body but becomes less severe.  · More stiffness, leading to loss of motion.  · Skin that is pale, dry, shiny, or tightly stretched.  How is this diagnosed?  This condition may be diagnosed based on:  · Your signs and symptoms.   · A physical exam.  There is no test to diagnose CRPS, but you may have tests:  · To check for bone changes that might indicate CRPS. These tests may include an MRI or bone scan.  · To rule out other possible causes of your symptoms.  How is this treated?  Early treatment may prevent CRPS from advancing past stage 1. There is not one treatment that works for everyone. Treatment options may include:  · Medicines, which may include:  ? NSAIDs, such as ibuprofen.  ? Steroids.  ? Blood pressure drugs.  ? Antidepressants.  ? Anti-seizure drugs.  ? Pain relievers.  · Exercise.  · Occupational therapy (OT) and physical therapy (PT).  · Biofeedback.  · Mental health counseling.  · Numbing injections.  · Spinal surgery to implant a spinal cord stimulator or a pain pump.  Follow these instructions at home:  Medicines  · Take over-the-counter and prescription medicines only as told by your health care provider.  · Do not  drive or use heavy machinery while taking prescription pain medicine.  · If you are taking prescription pain medicine, take actions to prevent or treat constipation. Your health care provider may recommend that you:  ? Drink enough fluid to keep your urine pale yellow.  ? Eat foods that are high in fiber, such as fresh fruits and vegetables, whole grains, and beans.  ? Limit foods that are high in fat and processed sugars, such as fried or sweet foods.  ? Take an over-the-counter or prescription medicine for constipation.  General instructions  · Do not use any products that contain nicotine or tobacco, such as cigarettes and e-cigarettes. If you need help quitting, ask your health care provider.  · Maintain a healthy weight.  · Return to your normal activities as told by your health care provider. Ask your health care provider what activities are safe for you.  · Follow an exercise program as directed by your health care provider.  · Keep all follow-up visits as told by your health care provider. This is important.  Contact a health care provider if:  · Your symptoms change.  · Your symptoms get worse.  · You develop anxiety or depression.  Summary  · Complex regional pain syndrome (CRPS) is a nerve disorder that causes long-term (chronic) pain, usually in a hand, arm, leg, or foot.  · CRPS usually occurs after an injury or trauma, such as a fracture or sprain.  · CRPS ranges from mild to severe. Early treatment may prevent CRPS from advancing to more severe stages.  This information is not intended to replace advice given to you by your health care provider. Make sure you discuss any questions you have with your health care provider.  Document Released: 12/08/2003 Document Revised: 11/30/2018 Document Reviewed: 11/12/2018  [employer] Patient Education © 2020 [employer]

## 2021-05-16 NOTE — CARE PLAN
Problem: Communication  Goal: The ability to communicate needs accurately and effectively will improve  Outcome: Progressing     Problem: Urinary Elimination  Goal: Establish and maintain regular urinary output  Outcome: Progressing     Problem: Mobility  Goal: Patient's capacity to carry out activities will improve  Outcome: Progressing     Problem: Pain - Standard  Goal: Alleviation of pain or a reduction in pain to the patient’s comfort goal  Outcome: Progressing  Pt educated on pain rating scale, comfort goal, scheduled and PRN meds, alternative ways to better manage pain ie ice packs/heat/repositioning/mobilization. Pt verbalized understanding.       The patient is Stable - Low risk of patient condition declining or worsening    Shift Goals  Clinical Goals: pain control  Patient Goals: pain control    Progress made toward(s) clinical / shift goals: Adequate pain relief achieved with new pain regimen. Pt educated on    Patient is not progressing towards the following goals:

## 2021-05-16 NOTE — CARE PLAN
Problem: Communication  Goal: The ability to communicate needs accurately and effectively will improve  Outcome: Progressing     Problem: Respiratory  Goal: Patient will achieve/maintain optimum respiratory ventilation and gas exchange  Outcome: Progressing     Problem: Urinary Elimination  Goal: Establish and maintain regular urinary output  Outcome: Progressing     Problem: Pain - Standard  Goal: Alleviation of pain or a reduction in pain to the patient’s comfort goal  Outcome: Progressing       The patient is Stable - Low risk of patient condition declining or worsening    Shift Goals  Clinical Goals: Pain control   Patient Goals: Pain control     Progress made toward(s) clinical / shift goals: Pt educated on 1-10 pain scale. Pt able to communicate pain appropriately with healthcare team. Medicated per MAR. Pt educated on non-pharmacological interventions such as cold packs.     Patient is not progressing towards the following goals:

## 2021-05-16 NOTE — PROGRESS NOTES
Patient has a MRI Lumbar spine without contrast ordered. The patient has an implanted spinal cord stimulator. The patient does not know the model number. Initally MRI department looked up device compatibility based upon implant tab information in the chart. Patient had a stimulator implanted in 2015 that was MRI conditional for head only scans.     Upon further research, new information was obtained. According to [employer] representative the generator for that stimulator was explanted and replaced with a different stimulator battery in 2019. The implanted leads were not updated when the generator was replaced. The current implant is Bluebox SC 1200 Montage IPG  Leads: SC 2218-70 x2  [place]: [id number]    Per  guidelines this device is MRI Conditional for full body scans with strict limitations based upon the patients implanted leads. Those limitations require a specific absorption rate (SORAIDA) of less than 0.2 [place]/Kg. Unfortunately this restriction can not be completed at the inpatient magnet. This implant can only be scanned safely at our [facility] location. [facility] is an outpatient site open Monday through Friday. Film room was apprised of this information and stated they would update the MD at 1804.

## 2021-05-16 NOTE — PROGRESS NOTES
Assessment complete.  AA&Ox4.   RA. Denies SOB.  Reporting 5/10 pain. Medicated per MAR.    Tolerating regular diet. Denies N/V.  + void.  + BM.  Pt upself.   All needs met at this time. Call light within reach. Pt calls appropriately.

## 2021-05-16 NOTE — DISCHARGE PLANNING
Meds-to-Beds: Discharge prescription orders listed below delivered to patient's bedside by Brenna. RN notified. Patient verbalizes understanding to not take outpatient pain med and discharged pain med together. He states he will have a follow up with his pain management MD in 1-2 days.      and I have also discussed pt's outpatient Percocet. Pt's  reviewed. Both  and  are aware of patient's pain management therapy.     Sampson Fields   Home Medication Instructions ASHLEY:[id number]    Printed on:05/16/21 1631   Medication Information                      HYDROmorphone (DILAUDID) 2 MG Tab  Take 1-2 Tablets by mouth every 6 hours as needed for Severe Pain for up to 2 days.                 Hamida Woods, PharmD

## 2021-05-16 NOTE — DISCHARGE SUMMARY
Discharge Summary    CHIEF COMPLAINT ON ADMISSION  Chief Complaint   Patient presents with   • Urinary Retention     Dribbling urine starting today   • Low Back Pain     Right side starting Wed.   • Flank Pain     Right flank       Reason for Admission  ems     Admission Date  5/14/2021    CODE STATUS  Full Code    HPI & HOSPITAL COURSE  This is a 46 y.o. male here with intractable back pain.  The patient is a 46-year-old  male with a complex history of pain presently under the outpatient management of pain specialist Dr. Ghassan Coy.  Patient presented to our emergency department with complaints of right lower back pain with radiation to his thigh.  Patient was evaluated into emergency department admitted for further management of pain symptoms.  Patient did not have urinary retention denied saddle paresthesias denied changes in bowel habitus.  Patient did have positive right straight leg raise.  Patient also had mild point tenderness upon palpation lower spine.  CT imaging of the lumbar spine was performed without contrast and was significant for L5-S1 mild disc bulging on the right with some foraminal narrowing however no evidence of spinal cord impingement.  It should be noted MRI was unavailable as patient has implantation of a nerve stimulating device.  To be clear this device is MRI compatible however the parameters that are necessary for this device are only available in our [place] location which is not available on the weekends.  Again patient did not have red flag warning signs necessitating emergent call and of MRI team.    Findings were discussed in detail at bedside with the patient.  Furthermore I contacted Dr. Ghassan Coy by telephone to discuss plan of care with him.  Plan of care is as follows: Patient will receive prescription for hydromorphone 4 mg to be taken 1 to 2 tablets every 6 hours as needed for total of 2 days.  Additionally patient will go to Dr. Ghassan Coy's office morning of  5/17/2021 for epidural injection.  Furthermore patient was also told that he needed to be nothing by mouth 2 hours prior to procedure.  Additionally patient was also explained that he must not take NSAIDs 24 hours prior to procedure.  I appreciate recommendations and support from Dr. Coy.  This plan was discussed in detail with the patient at bedside and also nursing staff.  Patient is agreeable to this plan.  Prescriptions have been electronically sent.    Therefore, he is discharged in good and stable condition to home with close outpatient follow-up.    The patient recovered much more quickly than anticipated on admission.    Discharge Date  5/16/2021    FOLLOW UP ITEMS POST DISCHARGE  Go to all medical appointments as indicated take all medication as prescribed.  DISCHARGE DIAGNOSES  Principal Problem:    Back pain POA: Yes  Active Problems:    Dysuria POA: Yes  Resolved Problems:    * No resolved hospital problems. *      FOLLOW UP    Ghassan Coy M.D.  [address]  [facility] [address]  [phone]    Go on 5/17/2021  For likely epidural injection.  Dr. Coy's office will contact you morning of 5/17/2021      MEDICATIONS ON DISCHARGE     Medication List      START taking these medications      Instructions   HYDROmorphone 2 MG Tabs  Commonly known as: DILAUDID   Take 1-2 Tablets by mouth every 6 hours as needed for Severe Pain for up to 2 days.  Dose: 2-4 mg        CONTINUE taking these medications      Instructions   ibuprofen 600 MG Tabs  Commonly known as: MOTRIN   Take 600 mg by mouth every 6 hours as needed.  Dose: 600 mg     MEDI-PATCH-LIDOCAINE EX   Apply 1 Patch topically as needed.  Dose: 1 Patch     methylPREDNISolone 4 MG Tbpk  Commonly known as: MEDROL DOSEPAK   Take 4-24 mg by mouth every day. Follow schedule on package instructions.  Dose: 4-24 mg        STOP taking these medications    cyclobenzaprine 5 mg tablet  Commonly known as: Flexeril     oxyCODONE-acetaminophen  MG  Tabs  Commonly known as: PERCOCET-10            Allergies  Allergies   Allergen Reactions   • Morphine Swelling     Redness and itching   • Morphine Sulfate [Morphine]      unknown       DIET  Orders Placed This Encounter   Procedures   • Diet Order Diet: Regular     Standing Status:   Standing     Number of Occurrences:   1     Order Specific Question:   Diet:     Answer:   Regular [1]       ACTIVITY  As tolerated.  Weight bearing as tolerated    CONSULTATIONS  None    PROCEDURES  None    LABORATORY  Lab Results   Component Value Date    SODIUM 139 05/14/2021    POTASSIUM 4.3 05/14/2021    CHLORIDE 106 05/14/2021    CO2 24 05/14/2021    GLUCOSE 104 (H) 05/14/2021    BUN 17 05/14/2021    CREATININE 0.79 05/14/2021        Lab Results   Component Value Date    WBC 4.4 (L) 05/14/2021    HEMOGLOBIN 16.4 05/14/2021    HEMATOCRIT 47.9 05/14/2021    PLATELETCT 175 05/14/2021        Total time of the discharge process exceeds 35 minutes.

## 2021-05-16 NOTE — PROGRESS NOTES
Received report from previous RN  Assessment complete.  A&O x 4. Patient calls appropriately.  Patient ambulates with SBA  Patient has 10/10 pain. Pain managed with prescribed medications and non-pharmacological interventions.   Denies N&V. Tolerating regular diet.  + void, + flatus, last BM PTA.   Patient denies SOB. SpO2 >90% on 2L NC.     Patient laying in bed. Review plan with of care with patient. Call light and personal belongings with in reach. Hourly rounding in place. All needs met at this time.

## 2021-05-16 NOTE — PROGRESS NOTES
Valuated patient at bedside modify treatment options initiated care as protocol in addition to opioid pain medications for pain management.  Reviewed patient's implanted pains stimulator device.  Discussed with MRI supervisor regarding compatibility.  Based on patient device parameters it is on compatible with her [facility] site which is open Monday through Friday.  If patient is still hospitalized during the time until Monday may be scheduled on Monday however this likely will be required to be scheduled as outpatient.  Presently will only require CT of the spine without contrast.    We will consult neurosurgery tomorrow for further evaluation.

## 2022-05-02 ENCOUNTER — TELEPHONE (OUTPATIENT)
Dept: SCHEDULING | Facility: IMAGING CENTER | Age: 47
End: 2022-05-02

## 2022-08-11 ENCOUNTER — TELEPHONE (OUTPATIENT)
Dept: SCHEDULING | Facility: IMAGING CENTER | Age: 47
End: 2022-08-11

## 2022-08-14 ENCOUNTER — HOSPITAL ENCOUNTER (INPATIENT)
Facility: MEDICAL CENTER | Age: 47
LOS: 4 days | DRG: 552 | End: 2022-08-20
Attending: EMERGENCY MEDICINE | Admitting: HOSPITALIST
Payer: COMMERCIAL

## 2022-08-14 DIAGNOSIS — M54.17 LUMBOSACRAL RADICULOPATHY: Primary | ICD-10-CM

## 2022-08-14 DIAGNOSIS — G89.29 ACUTE EXACERBATION OF CHRONIC LOW BACK PAIN: ICD-10-CM

## 2022-08-14 DIAGNOSIS — M54.50 ACUTE EXACERBATION OF CHRONIC LOW BACK PAIN: ICD-10-CM

## 2022-08-14 PROCEDURE — 96375 TX/PRO/DX INJ NEW DRUG ADDON: CPT

## 2022-08-14 PROCEDURE — 96374 THER/PROPH/DIAG INJ IV PUSH: CPT

## 2022-08-14 PROCEDURE — 700101 HCHG RX REV CODE 250: Performed by: EMERGENCY MEDICINE

## 2022-08-14 PROCEDURE — 99285 EMERGENCY DEPT VISIT HI MDM: CPT

## 2022-08-14 PROCEDURE — 700111 HCHG RX REV CODE 636 W/ 250 OVERRIDE (IP): Performed by: EMERGENCY MEDICINE

## 2022-08-14 PROCEDURE — 36415 COLL VENOUS BLD VENIPUNCTURE: CPT

## 2022-08-14 RX ORDER — MIDAZOLAM HYDROCHLORIDE 1 MG/ML
4 INJECTION INTRAMUSCULAR; INTRAVENOUS ONCE
Status: COMPLETED | OUTPATIENT
Start: 2022-08-15 | End: 2022-08-14

## 2022-08-14 RX ADMIN — MIDAZOLAM HYDROCHLORIDE 4 MG: 1 INJECTION, SOLUTION INTRAMUSCULAR; INTRAVENOUS at 23:43

## 2022-08-14 RX ADMIN — KETAMINE HYDROCHLORIDE 25 MG: 10 INJECTION, SOLUTION INTRAMUSCULAR; INTRAVENOUS at 23:42

## 2022-08-14 ASSESSMENT — FIBROSIS 4 INDEX: FIB4 SCORE: 1.17

## 2022-08-14 ASSESSMENT — ENCOUNTER SYMPTOMS
BACK PAIN: 1
LOSS OF CONSCIOUSNESS: 0
FALLS: 1

## 2022-08-15 ENCOUNTER — APPOINTMENT (OUTPATIENT)
Dept: RADIOLOGY | Facility: MEDICAL CENTER | Age: 47
DRG: 552 | End: 2022-08-15
Attending: HOSPITALIST
Payer: COMMERCIAL

## 2022-08-15 ENCOUNTER — HOSPITAL ENCOUNTER (OUTPATIENT)
Dept: RADIOLOGY | Facility: MEDICAL CENTER | Age: 47
End: 2022-08-15
Attending: HOSPITALIST

## 2022-08-15 PROBLEM — M54.9 INTRACTABLE BACK PAIN: Status: ACTIVE | Noted: 2022-08-15

## 2022-08-15 PROBLEM — R03.0 ELEVATED BLOOD PRESSURE READING: Status: ACTIVE | Noted: 2022-08-15

## 2022-08-15 LAB
ALBUMIN SERPL BCP-MCNC: 4.2 G/DL (ref 3.2–4.9)
ALBUMIN/GLOB SERPL: 1.9 G/DL
ALP SERPL-CCNC: 62 U/L (ref 30–99)
ALT SERPL-CCNC: 49 U/L (ref 2–50)
ANION GAP SERPL CALC-SCNC: 12 MMOL/L (ref 7–16)
AST SERPL-CCNC: 24 U/L (ref 12–45)
BASOPHILS # BLD AUTO: 0.4 % (ref 0–1.8)
BASOPHILS # BLD: 0.02 K/UL (ref 0–0.12)
BILIRUB SERPL-MCNC: 0.4 MG/DL (ref 0.1–1.5)
BUN SERPL-MCNC: 15 MG/DL (ref 8–22)
CALCIUM SERPL-MCNC: 8.6 MG/DL (ref 8.4–10.2)
CHLORIDE SERPL-SCNC: 106 MMOL/L (ref 96–112)
CO2 SERPL-SCNC: 22 MMOL/L (ref 20–33)
CREAT SERPL-MCNC: 0.68 MG/DL (ref 0.5–1.4)
EOSINOPHIL # BLD AUTO: 0.06 K/UL (ref 0–0.51)
EOSINOPHIL NFR BLD: 1.1 % (ref 0–6.9)
ERYTHROCYTE [DISTWIDTH] IN BLOOD BY AUTOMATED COUNT: 40.7 FL (ref 35.9–50)
GFR SERPLBLD CREATININE-BSD FMLA CKD-EPI: 116 ML/MIN/1.73 M 2
GLOBULIN SER CALC-MCNC: 2.2 G/DL (ref 1.9–3.5)
GLUCOSE SERPL-MCNC: 94 MG/DL (ref 65–99)
HCT VFR BLD AUTO: 42.4 % (ref 42–52)
HGB BLD-MCNC: 15 G/DL (ref 14–18)
IMM GRANULOCYTES # BLD AUTO: 0.01 K/UL (ref 0–0.11)
IMM GRANULOCYTES NFR BLD AUTO: 0.2 % (ref 0–0.9)
LYMPHOCYTES # BLD AUTO: 2.18 K/UL (ref 1–4.8)
LYMPHOCYTES NFR BLD: 39.9 % (ref 22–41)
MCH RBC QN AUTO: 33.1 PG (ref 27–33)
MCHC RBC AUTO-ENTMCNC: 35.4 G/DL (ref 33.7–35.3)
MCV RBC AUTO: 93.6 FL (ref 81.4–97.8)
MONOCYTES # BLD AUTO: 0.47 K/UL (ref 0–0.85)
MONOCYTES NFR BLD AUTO: 8.6 % (ref 0–13.4)
NEUTROPHILS # BLD AUTO: 2.72 K/UL (ref 1.82–7.42)
NEUTROPHILS NFR BLD: 49.8 % (ref 44–72)
NRBC # BLD AUTO: 0 K/UL
NRBC BLD-RTO: 0 /100 WBC
PLATELET # BLD AUTO: 193 K/UL (ref 164–446)
PMV BLD AUTO: 9 FL (ref 9–12.9)
POTASSIUM SERPL-SCNC: 4 MMOL/L (ref 3.6–5.5)
PROT SERPL-MCNC: 6.4 G/DL (ref 6–8.2)
RBC # BLD AUTO: 4.53 M/UL (ref 4.7–6.1)
SODIUM SERPL-SCNC: 140 MMOL/L (ref 135–145)
WBC # BLD AUTO: 5.5 K/UL (ref 4.8–10.8)

## 2022-08-15 PROCEDURE — 700102 HCHG RX REV CODE 250 W/ 637 OVERRIDE(OP): Performed by: HOSPITALIST

## 2022-08-15 PROCEDURE — 94760 N-INVAS EAR/PLS OXIMETRY 1: CPT

## 2022-08-15 PROCEDURE — 700111 HCHG RX REV CODE 636 W/ 250 OVERRIDE (IP): Performed by: HOSPITALIST

## 2022-08-15 PROCEDURE — G0378 HOSPITAL OBSERVATION PER HR: HCPCS

## 2022-08-15 PROCEDURE — A9270 NON-COVERED ITEM OR SERVICE: HCPCS | Performed by: HOSPITALIST

## 2022-08-15 PROCEDURE — 700101 HCHG RX REV CODE 250: Performed by: EMERGENCY MEDICINE

## 2022-08-15 PROCEDURE — 96376 TX/PRO/DX INJ SAME DRUG ADON: CPT

## 2022-08-15 PROCEDURE — 80053 COMPREHEN METABOLIC PANEL: CPT

## 2022-08-15 PROCEDURE — 85025 COMPLETE CBC W/AUTO DIFF WBC: CPT

## 2022-08-15 PROCEDURE — 99219 PR INITIAL OBSERVATION CARE,LEVL II: CPT | Performed by: HOSPITALIST

## 2022-08-15 PROCEDURE — 700102 HCHG RX REV CODE 250 W/ 637 OVERRIDE(OP): Performed by: EMERGENCY MEDICINE

## 2022-08-15 PROCEDURE — 72131 CT LUMBAR SPINE W/O DYE: CPT

## 2022-08-15 PROCEDURE — A9270 NON-COVERED ITEM OR SERVICE: HCPCS | Performed by: EMERGENCY MEDICINE

## 2022-08-15 PROCEDURE — 700111 HCHG RX REV CODE 636 W/ 250 OVERRIDE (IP): Performed by: EMERGENCY MEDICINE

## 2022-08-15 PROCEDURE — 36415 COLL VENOUS BLD VENIPUNCTURE: CPT

## 2022-08-15 PROCEDURE — 72128 CT CHEST SPINE W/O DYE: CPT

## 2022-08-15 RX ORDER — ONDANSETRON 2 MG/ML
4 INJECTION INTRAMUSCULAR; INTRAVENOUS EVERY 4 HOURS PRN
Status: DISCONTINUED | OUTPATIENT
Start: 2022-08-15 | End: 2022-08-20 | Stop reason: HOSPADM

## 2022-08-15 RX ORDER — SUMATRIPTAN 25 MG/1
25 TABLET, FILM COATED ORAL EVERY 8 HOURS PRN
Status: DISPENSED | OUTPATIENT
Start: 2022-08-15 | End: 2022-08-16

## 2022-08-15 RX ORDER — GABAPENTIN 300 MG/1
300 CAPSULE ORAL ONCE
Status: COMPLETED | OUTPATIENT
Start: 2022-08-15 | End: 2022-08-15

## 2022-08-15 RX ORDER — ACETAMINOPHEN 500 MG
1000 TABLET ORAL ONCE
Status: COMPLETED | OUTPATIENT
Start: 2022-08-15 | End: 2022-08-15

## 2022-08-15 RX ORDER — AMOXICILLIN 250 MG
2 CAPSULE ORAL 2 TIMES DAILY
Status: DISCONTINUED | OUTPATIENT
Start: 2022-08-15 | End: 2022-08-20 | Stop reason: HOSPADM

## 2022-08-15 RX ORDER — PROMETHAZINE HYDROCHLORIDE 25 MG/1
12.5-25 SUPPOSITORY RECTAL EVERY 4 HOURS PRN
Status: DISCONTINUED | OUTPATIENT
Start: 2022-08-15 | End: 2022-08-20 | Stop reason: HOSPADM

## 2022-08-15 RX ORDER — PROMETHAZINE HYDROCHLORIDE 25 MG/1
12.5-25 TABLET ORAL EVERY 4 HOURS PRN
Status: DISCONTINUED | OUTPATIENT
Start: 2022-08-15 | End: 2022-08-20 | Stop reason: HOSPADM

## 2022-08-15 RX ORDER — HYDROMORPHONE HYDROCHLORIDE 1 MG/ML
1 INJECTION, SOLUTION INTRAMUSCULAR; INTRAVENOUS; SUBCUTANEOUS ONCE
Status: COMPLETED | OUTPATIENT
Start: 2022-08-15 | End: 2022-08-15

## 2022-08-15 RX ORDER — BISACODYL 10 MG
10 SUPPOSITORY, RECTAL RECTAL
Status: DISCONTINUED | OUTPATIENT
Start: 2022-08-15 | End: 2022-08-20 | Stop reason: HOSPADM

## 2022-08-15 RX ORDER — GABAPENTIN 300 MG/1
300 CAPSULE ORAL 3 TIMES DAILY
Qty: 60 CAPSULE | Refills: 0 | Status: SHIPPED | OUTPATIENT
Start: 2022-08-15 | End: 2022-08-20 | Stop reason: SDUPTHER

## 2022-08-15 RX ORDER — OXYCODONE HYDROCHLORIDE 10 MG/1
10 TABLET ORAL
Status: DISCONTINUED | OUTPATIENT
Start: 2022-08-15 | End: 2022-08-19

## 2022-08-15 RX ORDER — ONDANSETRON 4 MG/1
4 TABLET, ORALLY DISINTEGRATING ORAL EVERY 4 HOURS PRN
Status: DISCONTINUED | OUTPATIENT
Start: 2022-08-15 | End: 2022-08-20 | Stop reason: HOSPADM

## 2022-08-15 RX ORDER — ACETAMINOPHEN 325 MG/1
650 TABLET ORAL EVERY 6 HOURS PRN
Status: DISCONTINUED | OUTPATIENT
Start: 2022-08-15 | End: 2022-08-20 | Stop reason: HOSPADM

## 2022-08-15 RX ORDER — HYDROMORPHONE HYDROCHLORIDE 1 MG/ML
0.5 INJECTION, SOLUTION INTRAMUSCULAR; INTRAVENOUS; SUBCUTANEOUS
Status: DISCONTINUED | OUTPATIENT
Start: 2022-08-15 | End: 2022-08-15

## 2022-08-15 RX ORDER — METHYL SALICYLATE MENTHOL CAPSAICIN LIDOCAINE 20; 5; .035; .5 G/100G; G/100G; G/100G; G/100G
1 PATCH TOPICAL PRN
Qty: 30 PATCH | Refills: 0 | Status: SHIPPED | OUTPATIENT
Start: 2022-08-15 | End: 2023-07-02

## 2022-08-15 RX ORDER — PROCHLORPERAZINE EDISYLATE 5 MG/ML
5-10 INJECTION INTRAMUSCULAR; INTRAVENOUS EVERY 4 HOURS PRN
Status: DISCONTINUED | OUTPATIENT
Start: 2022-08-15 | End: 2022-08-20 | Stop reason: HOSPADM

## 2022-08-15 RX ORDER — METHOCARBAMOL 750 MG/1
1500 TABLET, FILM COATED ORAL 3 TIMES DAILY PRN
Qty: 30 TABLET | Refills: 0 | Status: SHIPPED | OUTPATIENT
Start: 2022-08-15 | End: 2022-08-20 | Stop reason: SDUPTHER

## 2022-08-15 RX ORDER — POLYETHYLENE GLYCOL 3350 17 G/17G
1 POWDER, FOR SOLUTION ORAL
Status: DISCONTINUED | OUTPATIENT
Start: 2022-08-15 | End: 2022-08-20 | Stop reason: HOSPADM

## 2022-08-15 RX ORDER — OXYCODONE HYDROCHLORIDE 5 MG/1
5 TABLET ORAL
Status: DISCONTINUED | OUTPATIENT
Start: 2022-08-15 | End: 2022-08-15

## 2022-08-15 RX ORDER — HYDROMORPHONE HYDROCHLORIDE 1 MG/ML
1 INJECTION, SOLUTION INTRAMUSCULAR; INTRAVENOUS; SUBCUTANEOUS
Status: DISCONTINUED | OUTPATIENT
Start: 2022-08-15 | End: 2022-08-20 | Stop reason: HOSPADM

## 2022-08-15 RX ADMIN — OXYCODONE HYDROCHLORIDE 10 MG: 10 TABLET ORAL at 05:28

## 2022-08-15 RX ADMIN — HYDROMORPHONE HYDROCHLORIDE 1 MG: 1 INJECTION, SOLUTION INTRAMUSCULAR; INTRAVENOUS; SUBCUTANEOUS at 03:03

## 2022-08-15 RX ADMIN — OXYCODONE HYDROCHLORIDE 10 MG: 10 TABLET ORAL at 20:21

## 2022-08-15 RX ADMIN — HYDROMORPHONE HYDROCHLORIDE 1 MG: 1 INJECTION, SOLUTION INTRAMUSCULAR; INTRAVENOUS; SUBCUTANEOUS at 08:30

## 2022-08-15 RX ADMIN — HYDROMORPHONE HYDROCHLORIDE 1 MG: 1 INJECTION, SOLUTION INTRAMUSCULAR; INTRAVENOUS; SUBCUTANEOUS at 23:32

## 2022-08-15 RX ADMIN — GABAPENTIN 300 MG: 300 CAPSULE ORAL at 00:09

## 2022-08-15 RX ADMIN — SUMATRIPTAN SUCCINATE 25 MG: 25 TABLET ORAL at 20:22

## 2022-08-15 RX ADMIN — SENNOSIDES AND DOCUSATE SODIUM 2 TABLET: 50; 8.6 TABLET ORAL at 05:28

## 2022-08-15 RX ADMIN — OXYCODONE HYDROCHLORIDE 10 MG: 10 TABLET ORAL at 10:47

## 2022-08-15 RX ADMIN — HYDROMORPHONE HYDROCHLORIDE 1 MG: 1 INJECTION, SOLUTION INTRAMUSCULAR; INTRAVENOUS; SUBCUTANEOUS at 14:16

## 2022-08-15 RX ADMIN — KETAMINE HYDROCHLORIDE 25 MG: 10 INJECTION, SOLUTION INTRAMUSCULAR; INTRAVENOUS at 01:12

## 2022-08-15 RX ADMIN — ACETAMINOPHEN 325MG 650 MG: 325 TABLET ORAL at 14:16

## 2022-08-15 RX ADMIN — HYDROMORPHONE HYDROCHLORIDE 1 MG: 1 INJECTION, SOLUTION INTRAMUSCULAR; INTRAVENOUS; SUBCUTANEOUS at 00:43

## 2022-08-15 RX ADMIN — ACETAMINOPHEN 325MG 650 MG: 325 TABLET ORAL at 05:27

## 2022-08-15 RX ADMIN — ACETAMINOPHEN 1000 MG: 500 TABLET ORAL at 00:10

## 2022-08-15 ASSESSMENT — ENCOUNTER SYMPTOMS
HEADACHES: 0
ABDOMINAL PAIN: 0
NECK PAIN: 0
DIAPHORESIS: 0
PALPITATIONS: 0
NERVOUS/ANXIOUS: 0
HEARTBURN: 0
NEUROLOGICAL NEGATIVE: 1
VOMITING: 0
MYALGIAS: 0
PSYCHIATRIC NEGATIVE: 1
DEPRESSION: 0
DIZZINESS: 0
COUGH: 0
BLURRED VISION: 0
FALLS: 1
BLOOD IN STOOL: 0
LOSS OF CONSCIOUSNESS: 0
DIARRHEA: 0
WEAKNESS: 0
BACK PAIN: 1
SHORTNESS OF BREATH: 0
CARDIOVASCULAR NEGATIVE: 1
NAUSEA: 0
GASTROINTESTINAL NEGATIVE: 1
CONSTITUTIONAL NEGATIVE: 1
SEIZURES: 0
SORE THROAT: 0
BRUISES/BLEEDS EASILY: 0
WHEEZING: 0
CHILLS: 0
INSOMNIA: 0
HEMOPTYSIS: 0
DOUBLE VISION: 0
CONSTIPATION: 0
EYES NEGATIVE: 1
FEVER: 0
RESPIRATORY NEGATIVE: 1
FOCAL WEAKNESS: 0

## 2022-08-15 ASSESSMENT — LIFESTYLE VARIABLES
CONSUMPTION TOTAL: INCOMPLETE
ALCOHOL_USE: NO
HAVE PEOPLE ANNOYED YOU BY CRITICIZING YOUR DRINKING: NO
TOTAL SCORE: 0
HAVE YOU EVER FELT YOU SHOULD CUT DOWN ON YOUR DRINKING: NO
HAVE PEOPLE ANNOYED YOU BY CRITICIZING YOUR DRINKING: NO
TOTAL SCORE: 0
EVER HAD A DRINK FIRST THING IN THE MORNING TO STEADY YOUR NERVES TO GET RID OF A HANGOVER: NO
ON A TYPICAL DAY WHEN YOU DRINK ALCOHOL HOW MANY DRINKS DO YOU HAVE: 0
HOW MANY TIMES IN THE PAST YEAR HAVE YOU HAD 5 OR MORE DRINKS IN A DAY: 0
TOTAL SCORE: 0
CONSUMPTION TOTAL: NEGATIVE
EVER FELT BAD OR GUILTY ABOUT YOUR DRINKING: NO
EVER HAD A DRINK FIRST THING IN THE MORNING TO STEADY YOUR NERVES TO GET RID OF A HANGOVER: NO
ALCOHOL_USE: NO
TOTAL SCORE: 0
HAVE YOU EVER FELT YOU SHOULD CUT DOWN ON YOUR DRINKING: NO
AVERAGE NUMBER OF DAYS PER WEEK YOU HAVE A DRINK CONTAINING ALCOHOL: 0
TOTAL SCORE: 0
TOTAL SCORE: 0
EVER FELT BAD OR GUILTY ABOUT YOUR DRINKING: NO

## 2022-08-15 ASSESSMENT — COGNITIVE AND FUNCTIONAL STATUS - GENERAL
SUGGESTED CMS G CODE MODIFIER DAILY ACTIVITY: CK
MOVING FROM LYING ON BACK TO SITTING ON SIDE OF FLAT BED: A LITTLE
PERSONAL GROOMING: A LITTLE
MOBILITY SCORE: 17
SUGGESTED CMS G CODE MODIFIER MOBILITY: CK
DRESSING REGULAR LOWER BODY CLOTHING: A LITTLE
TURNING FROM BACK TO SIDE WHILE IN FLAT BAD: A LITTLE
CLIMB 3 TO 5 STEPS WITH RAILING: A LITTLE
EATING MEALS: A LITTLE
DRESSING REGULAR LOWER BODY CLOTHING: A LITTLE
MOVING FROM LYING ON BACK TO SITTING ON SIDE OF FLAT BED: A LITTLE
TOILETING: A LITTLE
HELP NEEDED FOR BATHING: A LITTLE
WALKING IN HOSPITAL ROOM: A LITTLE
WALKING IN HOSPITAL ROOM: A LITTLE
MOVING TO AND FROM BED TO CHAIR: A LITTLE
CLIMB 3 TO 5 STEPS WITH RAILING: A LITTLE
MOBILITY SCORE: 18
STANDING UP FROM CHAIR USING ARMS: A LOT
DRESSING REGULAR UPPER BODY CLOTHING: A LITTLE
DAILY ACTIVITIY SCORE: 18
MOVING TO AND FROM BED TO CHAIR: A LITTLE
STANDING UP FROM CHAIR USING ARMS: A LITTLE
SUGGESTED CMS G CODE MODIFIER MOBILITY: CK
TURNING FROM BACK TO SIDE WHILE IN FLAT BAD: A LITTLE

## 2022-08-15 ASSESSMENT — PAIN DESCRIPTION - PAIN TYPE
TYPE: ACUTE PAIN
TYPE: ACUTE PAIN

## 2022-08-15 ASSESSMENT — PAIN SCALES - WONG BAKER
WONGBAKER_NUMERICALRESPONSE: HURTS A LITTLE MORE
WONGBAKER_NUMERICALRESPONSE: HURTS EVEN MORE

## 2022-08-15 NOTE — ED PROVIDER NOTES
ED Provider Note    Scribed for CHRISTINE Shaffer II* by Ilene Lo. 8/14/2022  11:28 PM    Means of Arrival: walk-in  History obtained by: patient  Limitations: none    CHIEF COMPLAINT  Chief Complaint   Patient presents with    Back Pain     Pt c/o lower back Pn, Pt stated that they performed an MRI earlier this year - L3-5 is bulging, compressed and lacerated, hasnt gotten it treated yet but was seen by Dr. Devries at [facility] for concerns; Pt stated that he did fall today because of his back Pn - denies hitting his head and/or LOC; Pt denies taking any medication to treat;        HPI  Jarod Fields is a 47 y.o. male who presents to the Emergency Department for evaluation of mid to left lower back pain onset today. Earlier today, Sampson was [place] to [employer] when he developed moderate lower back pain. Pain progressively worsened. At one point yesterday he had a bad spasm like pain and fell to the ground, had some urinary incontinence. He denies any urinary or stool incontinence since then. No problems with urinary retention.  His wife took him back to the hotel, and while he was in the shower he slipped and fell. He denies hitting his head or any loss of consciousness. Right now he is in a moderate amount of pain that radiates down his left leg. No alleviating or exacerbating factors were reported. Sampson has a complicated back pain history with multiple visits to the ED. However Sampson has not had back pain in months. He states he has a  nerve stimulator for back pain in place on his left flank.  Pain management provider is Dr. Coy    REVIEW OF SYSTEMS  Review of Systems   Musculoskeletal:  Positive for back pain and falls.   Neurological:  Negative for loss of consciousness.   All other systems reviewed and are negative.  See HPI for further details.     PAST MEDICAL HISTORY   has a past medical history of CRPS (complex regional pain syndrome type I) (2013) and Pain (2/18/15).    SOCIAL  "HISTORY  Social History     Tobacco Use    Smoking status: Every Day     Packs/day: 0.50     Types: Cigarettes    Smokeless tobacco: Never    Tobacco comments:     1/2 ppd since 13yo, on chantix   Vaping Use    Vaping Use: Never used   Substance and Sexual Activity    Alcohol use: Not Currently    Drug use: No     Comment: denies     Sexual activity: Not reported       SURGICAL HISTORY   has a past surgical history that includes inject nerv blck,stellate ganglion (11/26/2014); inject nerv blck,stellate ganglion (12/3/2014); inject nerv blck,stellate ganglion (12/10/2014); percut implnt neuroelect,epidural (2/4/2015); percut implnt neuroelect,epidural (2/4/2015); spinal cord stimulator (2/19/2015); gastroscopy with biopsy (6/22/2013); and other orthopedic surgery.    CURRENT MEDICATIONS  Home Medications    **Home medications have not yet been reviewed for this encounter**         ALLERGIES  Allergies   Allergen Reactions    Morphine Swelling     Redness and itching    Morphine Sulfate [Morphine]      unknown       PHYSICAL EXAM  VITAL SIGNS: BP (!) 178/110   Pulse 74   Temp 36.7 °C (98.1 °F) (Temporal)   Resp (!) 24   Ht 1.803 m (5' 11\")   Wt 99.8 kg (220 lb)   SpO2 97%   BMI 30.68 kg/m²    Pulse ox interpretation: I interpret this pulse ox as normal.  Constitutional: Alert 47 y.o. male who appears uncomfortable. Sitting on edge of bed with hand on his lower back  HENT: Normocephalic, Atraumatic, Bilateral external ears normal. Nose normal.   Neck: Supple, no stridor.   Eyes: Pupils are equal. Conjunctiva normal, non-icteric.   Heart: Regular rate and rhythm, no murmurs.    Lungs: Normal respiratory effort Clear to auscultation bilaterally.  Abdomen: Normal appearance, nondistended, nontender.  Skin: Warm, Dry, No erythema, No rash. No open wounds.   Neurologic: Alert and oriented x4,  No obvious weakness but exam is limited due to pain. NO sensory deficit. Grossly non-focal.   MSK: Moving all extremities,No " "tenderness or obvious deformities.   Back: Palpable stimulator at left flank. No midline spine tenderness. Bilateral paralumbar tenderness more on the left than left.   Psychiatric:  Appears appropriate and not intoxicated.     COURSE & MEDICAL DECISION MAKING  Pertinent Labs & Imaging studies reviewed. (See chart for details)    11:28 PM This is an emergent evaluation of a 47 y.o., male who presents with recurrent back pain with radiculopathy down his left leg. Strength still present and no obvious red flags to suggest cauda equina, infection. Patient will be treated with Versed 4 mg injection for muscle relaxation. He will also be given Ketamine 25 mg in 50 mL NS infusion for his pain.     12:05 AM Patient still has pain after Versed and Ketamine infusion. He will be treated with Tylenol 1,000 mg tablet and gabapentin 300 mg capsule for his pain.     12:35 AM Patient was reevaluated at bedside. Patient had little to no alleviation of his pain. Patient will be treated with Dilaudid 1 mg for his persistent pain.    12:56 AM Patient was reevaluated at bedside. Showed me MRI results from this past year which shows he has lower lumbar degenerative disk disease with stenosis.No mentions of \"lacerated\" nerves.     1:05 AM Patient will receive another Ketamine infusion for his pain.     1:36 AM Patient was reevaluated at bedside. Patient's pain is alleviated.  I will prescribe robaxin, gabapentin, and lidocaine patch for at home pain management. Discussed plans for him to ambulate. If his pain remains manageable he will be discharge. If pain returns he will be admitted for pain management.  Patient verbalizes understanding and agreement to this plan of care.     1:57 AM Attempted to get patient out of bed. His pain returned. Will hospitalize for pain control.     2:02 AM I discussed the patient's case and the above findings with Dr. Arnold (Hospitalist) who agrees to hospitalize the patient.     DISPOSITION:  Patient will " be hospitalized by Dr. Arnold in guarded condition.    FINAL IMPRESSION  1. Lumbosacral radiculopathy Active   2. Acute exacerbation of chronic low back pain           IIlene (Scribe), am scribing for, and in the presence of, SOPHIA Shaffer II.    Electronically signed by: Ilene Lo (Scribe), 8/14/2022    ISanjay II, M* personally performed the services described in this documentation, as scribed by Ilene Lo in my presence, and it is both accurate and complete.    The note accurately reflects work and decisions made by me.  Sanjay Feliciano II, M.D.  8/15/2022  3:31 AM

## 2022-08-15 NOTE — ASSESSMENT & PLAN NOTE
-Secondary to the pain.  He does not have an established diagnosis of hypertension.  -Continue control pain which should improve blood pressure control.  Monitor blood pressure trend closely.  If elevated blood pressure persists despite adequate pain control, consider starting low-dose antihypertensives.

## 2022-08-15 NOTE — H&P
"Hospital Medicine History & Physical Note    Date of Service  8/15/2022    Primary Care Physician  Ray Mcdonnell D.O.    Consultants  None    Code Status  Full Code    Chief Complaint  Chief Complaint   Patient presents with    Back Pain     Pt c/o lower back Pn, Pt stated that they performed an MRI earlier this year - L3-5 is bulging, compressed and lacerated, hasnt gotten it treated yet but was seen by Dr. Devries at [facility] for concerns; Pt stated that he did fall today because of his back Pn - denies hitting his head and/or LOC; Pt denies taking any medication to treat;        History of Presenting Illness  Jarod Fields is a 47 y.o. male with h/o chronic back pain and implanted nerve stimulating device, who follows with pain management outpatient Dr. Ghassan Coy.  Over the past few months, feeling better of his pain, reason why he thought he would no longer need to have back surgery.  He had established care with Dr. Devries, we did not follow-up on this.  Last admission here was in May 2021, he has L5-S1 central and right side bulging.  He ended up doing an MRI outpatient for this given compatibility with his device. He presented tonight on 8/14/2022 with complains of worsening back pain.  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  Also reports ground-level fall while in the shower earlier today.  No urinary or stool incontinence, except for one instance when he was in a lot of pain but able to urinate after this.  No loss of consciousness and no head trauma.  The pain has been progressively worse, rated as 10/10, sharp in nature and radiating down to his legs.  Here in the ER, he received 4 mg of Versed, 2 rounds of ketamine, 1 mg of Dilaudid, gabapentin and Tylenol without significant improvement, reason why I was called for admission.    I discussed the plan of care with patient.    Review of " Systems  Review of Systems   Constitutional:  Negative for fever.   HENT:  Negative for congestion and sore throat.    Eyes:  Negative for blurred vision and double vision.   Respiratory:  Negative for cough and shortness of breath.    Cardiovascular:  Negative for chest pain and palpitations.   Gastrointestinal:  Negative for nausea and vomiting.   Genitourinary:  Negative for dysuria and urgency.   Musculoskeletal:  Positive for back pain and falls. Negative for myalgias and neck pain.   Skin:  Negative for itching and rash.   Neurological:  Negative for dizziness, weakness and headaches.   Endo/Heme/Allergies:  Does not bruise/bleed easily.   Psychiatric/Behavioral:  Negative for depression. The patient does not have insomnia.      Past Medical History   has a past medical history of CRPS (complex regional pain syndrome type I) (2013) and Pain (2/18/15).    Surgical History   has a past surgical history that includes pr inject nerv blck,stellate ganglion (11/26/2014); pr inject nerv blck,stellate ganglion (12/3/2014); pr inject nerv blck,stellate ganglion (12/10/2014); pr percut implnt neuroelect,epidural (2/4/2015); pr percut implnt neuroelect,epidural (2/4/2015); spinal cord stimulator (2/19/2015); gastroscopy with biopsy (6/22/2013); and other orthopedic surgery.     Family History  family history includes Cancer in an other family member; Hypertension in an other family member.   Family history reviewed with patient. There is no family history that is pertinent to the chief complaint.     Social History   reports that he has been smoking cigarettes. He has been smoking an average of .5 packs per day. He has never used smokeless tobacco. He reports that he does not currently use alcohol. He reports that he does not use drugs.    Allergies  Allergies   Allergen Reactions    Morphine Swelling     Redness and itching    Morphine Sulfate [Morphine]      unknown       Medications  Prior to Admission Medications    Prescriptions Last Dose Informant Patient Reported? Taking?   ID NOW COVID-19 Kit   Yes No   Sig: TEST AS DIRECTED TODAY   Lido-Capsaicin-Men-Methyl Sal (MEDI-PATCH-LIDOCAINE EX)  Patient Yes No   Sig: Apply 1 Patch topically as needed.   Lido-Capsaicin-Men-Methyl Sal (MEDI-PATCH-LIDOCAINE) 0.5-0.035-5-20 % Patch   No Yes   Sig: Apply 1 Patch topically as needed (low back pain).   ibuprofen (MOTRIN) 600 MG Tab  Patient Yes No   Sig: Take 600 mg by mouth every 6 hours as needed.   methylPREDNISolone (MEDROL DOSEPAK) 4 MG Tablet Therapy Pack  Patient Yes No   Sig: Take 4-24 mg by mouth every day. Follow schedule on package instructions.      Facility-Administered Medications: None       Physical Exam  Temp:  [36.7 °C (98.1 °F)] 36.7 °C (98.1 °F)  Pulse:  [72-81] 74  Resp:  [12-27] 12  BP: (146-178)/() 165/96  SpO2:  [91 %-97 %] 93 %  Blood Pressure: (!) 165/96   Temperature: 36.7 °C (98.1 °F)   Pulse: 74   Respiration: 12   Pulse Oximetry: 93 %       Physical Exam  Constitutional:       Comments: Looks in pain   HENT:      Head: Normocephalic and atraumatic.      Nose: Nose normal.      Mouth/Throat:      Mouth: Mucous membranes are moist.      Pharynx: Oropharynx is clear.   Eyes:      Extraocular Movements: Extraocular movements intact.      Pupils: Pupils are equal, round, and reactive to light.   Cardiovascular:      Rate and Rhythm: Normal rate and regular rhythm.      Pulses: Normal pulses.      Heart sounds: Normal heart sounds.   Pulmonary:      Effort: Pulmonary effort is normal.      Breath sounds: Normal breath sounds.   Abdominal:      General: Abdomen is flat. Bowel sounds are normal.      Palpations: Abdomen is soft.   Musculoskeletal:      Cervical back: Normal range of motion and neck supple.      Comments: Patient seems very uncomfortable, in pain.  He is able to kneel down and has no strength issues.  He does have difficulty to do full range of motion secondary to pain.   Skin:     General:  Skin is warm and dry.   Neurological:      General: No focal deficit present.      Mental Status: He is alert and oriented to person, place, and time.      Cranial Nerves: No cranial nerve deficit.      Sensory: No sensory deficit.      Motor: No weakness.      Coordination: Coordination normal.   Psychiatric:         Mood and Affect: Mood normal.         Behavior: Behavior normal.       Laboratory:          No results for input(s): ALTSGPT, ASTSGOT, ALKPHOSPHAT, TBILIRUBIN, DBILIRUBIN, GAMMAGT, AMYLASE, LIPASE, ALB, PREALBUMIN, GLUCOSE in the last 72 hours.      No results for input(s): NTPROBNP in the last 72 hours.      No results for input(s): TROPONINT in the last 72 hours.    Imaging:  No orders to display           Assessment/Plan:  Justification for Admission Status  I anticipate this patient is appropriate for observation status at this time because intractable back pain        * Intractable back pain- (present on admission)  Assessment & Plan  -Observation status on medical floor.  -Patient with extensive back history, and implanted a nerve stimulating device.  He was following with pain management but has been off opiates recently.  -Long driving and fall earlier in the day that is likely precipitating this event and ground-level fall earlier today.  Adding CT spine lumbar and thoracic for further detail.  -Patient started following with Dr. Devries about a year ago but his pain much improved and he never follow-up.  -In the ER he received Versed, ketamine, Dilaudid and Tylenol without significant improvement.  -We will bring him here for pain control.  I am starting him on oxycodone and as needed Dilaudid.  -PT/OT evaluation in the morning.  -There is no red flags reported.    Elevated blood pressure reading  Assessment & Plan  -Likely secondary to severe pain.  He has not taking any antihypertensives and I will monitor his blood pressure throughout the hospitalization once his pain is controlled.      VTE  prophylaxis: SCDs/TEDs

## 2022-08-15 NOTE — ED NOTES
"Chief Complaint   Patient presents with    Back Pain     Pt c/o lower back Pn, Pt stated that they performed an MRI earlier this year - L3-5 is bulging, compressed and lacerated, hasnt gotten it treated yet but was seen by Dr. Devries at [facility] for concerns; Pt stated that he did fall today because of his back Pn - denies hitting his head and/or LOC; Pt denies taking any medication to treat;        ED Triage Vitals [08/14/22 2320]   Enc Vitals Group      Blood Pressure (Abnormal) 178/110      Pulse 74      Respiration (Abnormal) 24      Temperature 36.7 °C (98.1 °F)      Temp src Temporal      Pulse Oximetry 97 %      Weight 99.8 kg (220 lb)      Height 1.803 m (5' 11\")      Head Circumference       Peak Flow       Pain Score       Pain Loc       Pain Edu?       Excl. in GC?      "

## 2022-08-15 NOTE — PROGRESS NOTES
4 Eyes Skin Assessment Completed by Jesus CARRIZALES RN and LEONARD Wu.    Head WDL  Ears WDL  Nose WDL  Mouth WDL  Neck WDL  Breast/Chest WDL  Shoulder Blades WDL  Spine WDL  (R) Arm/Elbow/Hand WDL  (L) Arm/Elbow/Hand WDL  Abdomen WDL  Groin WDL  Scrotum/Coccyx/Buttocks WDL  (R) Leg WDL  (L) Leg WDL  (R) Heel/Foot/Toe WDL  (L) Heel/Foot/Toe WDL          Devices In Places Pulse Ox      Interventions In Place N/A    Possible Skin Injury No    Pictures Uploaded Into Epic N/A  Wound Consult Placed N/A  RN Wound Prevention Protocol Ordered No

## 2022-08-15 NOTE — CARE PLAN
The patient is Stable - Low risk of patient condition declining or worsening    Shift Goals Pain management  Clinical Goals: Pain managed to 5/10 or less  Patient Goals: Sleep comfortably and move without too much pain    Progress made toward(s) clinical / shift goals:  Pain - standard    Patient is not progressing towards the following goals:

## 2022-08-15 NOTE — PROGRESS NOTES
Hospital Medicine Daily Progress Note    Date of Service  8/15/2022    Chief Complaint  Jarod Fields is a 47 y.o. male admitted 8/14/2022 with low back pain    Hospital Course  Sampson is a 47-year-old gentleman comes into the hospital complaining of low back pain after he took a trip to [place].  About a year ago he had the same issue at which point he was recommended to have neurosurgical intervention right away.  He never followed up with them and the pain disappeared at that time.  The pain now is back 10 out of 10 and is rating down the left leg.  The pain is unable to be relieved.  Patient at this point will need an MRI to evaluate the degree of injury and then possible neurosurgical intervention will need to be planned.    Interval Problem Update  MRI pending  Pain management  Blood pressure management  Nutritional support  PT OT evaluation  Discharge planning    I have discussed this patient's plan of care and discharge plan at IDT rounds today with Case Management, Nursing, Nursing leadership, and other members of the IDT team.    Consultants/Specialty  None    Code Status  Full Code    Disposition  Patient is not medically cleared for discharge.   Anticipate discharge to to home with close outpatient follow-up.  I have placed the appropriate orders for post-discharge needs.    Review of Systems  Review of Systems   Constitutional: Negative.  Negative for chills, diaphoresis and fever.   HENT: Negative.     Eyes: Negative.  Negative for double vision.   Respiratory: Negative.  Negative for cough, hemoptysis and wheezing.    Cardiovascular: Negative.  Negative for chest pain, palpitations and leg swelling.   Gastrointestinal: Negative.  Negative for abdominal pain, blood in stool, constipation, diarrhea, heartburn, nausea and vomiting.   Genitourinary: Negative.  Negative for frequency, hematuria and urgency.   Musculoskeletal:  Positive for back pain. Negative for joint pain.   Skin: Negative.   Negative for itching and rash.   Neurological: Negative.  Negative for dizziness, focal weakness, seizures, loss of consciousness and headaches.   Endo/Heme/Allergies: Negative.  Does not bruise/bleed easily.   Psychiatric/Behavioral: Negative.  Negative for suicidal ideas. The patient is not nervous/anxious.    All other systems reviewed and are negative.     Physical Exam  Temp:  [36.2 °C (97.2 °F)-36.7 °C (98.1 °F)] 36.3 °C (97.4 °F)  Pulse:  [66-81] 68  Resp:  [12-30] 18  BP: (118-178)/() 118/60  SpO2:  [91 %-97 %] 91 %    Physical Exam  Vitals and nursing note reviewed. Exam conducted with a chaperone present.   Constitutional:       Appearance: He is well-developed. He is obese. He is ill-appearing and diaphoretic.   HENT:      Head: Normocephalic and atraumatic.      Right Ear: External ear normal.      Left Ear: External ear normal.      Nose: Nose normal.      Mouth/Throat:      Mouth: Mucous membranes are moist.      Pharynx: Oropharynx is clear.   Eyes:      Extraocular Movements: Extraocular movements intact.      Conjunctiva/sclera: Conjunctivae normal.      Pupils: Pupils are equal, round, and reactive to light.   Neck:      Thyroid: No thyromegaly.      Vascular: No JVD.   Cardiovascular:      Rate and Rhythm: Normal rate and regular rhythm.      Pulses: Normal pulses.      Heart sounds: Normal heart sounds.   Pulmonary:      Effort: Pulmonary effort is normal.      Breath sounds: Normal breath sounds.   Chest:      Chest wall: No tenderness.   Abdominal:      General: Abdomen is flat. Bowel sounds are normal. There is no distension.      Palpations: There is no mass.      Tenderness: There is no abdominal tenderness. There is no guarding or rebound.   Musculoskeletal:      Cervical back: Normal range of motion and neck supple.      Lumbar back: Tenderness present. Decreased range of motion.   Lymphadenopathy:      Cervical: No cervical adenopathy.   Skin:     General: Skin is warm.       Capillary Refill: Capillary refill takes less than 2 seconds.      Coloration: Skin is not jaundiced.      Findings: No lesion or rash.   Neurological:      Mental Status: He is alert and oriented to person, place, and time.      Cranial Nerves: No cranial nerve deficit.      Deep Tendon Reflexes: Reflexes are normal and symmetric.   Psychiatric:         Mood and Affect: Mood normal.         Behavior: Behavior normal.         Thought Content: Thought content normal.         Judgment: Judgment normal.       Fluids    Intake/Output Summary (Last 24 hours) at 8/15/2022 1151  Last data filed at 8/15/2022 0830  Gross per 24 hour   Intake 420 ml   Output 400 ml   Net 20 ml       Laboratory  Recent Labs     08/15/22  0531   WBC 5.5   RBC 4.53*   HEMOGLOBIN 15.0   HEMATOCRIT 42.4   MCV 93.6   MCH 33.1*   MCHC 35.4*   RDW 40.7   PLATELETCT 193   MPV 9.0     Recent Labs     08/15/22  0531   SODIUM 140   POTASSIUM 4.0   CHLORIDE 106   CO2 22   GLUCOSE 94   BUN 15   CREATININE 0.68   CALCIUM 8.6                   Imaging  CT-TSPINE W/O PLUS RECONS   Final Result         1.  No acute traumatic bony injury of the thoracic spine.      CT-LSPINE W/O PLUS RECONS   Final Result         1.  No acute traumatic bony injury of the lumbar spine.   2.  Right nephrolithiasis without visualized obstructive changes      OUTSIDE IMAGES-CT LUMBAR SPINE    (Results Pending)   OUTSIDE IMAGES-CT THORACIC SPINE    (Results Pending)   MR-LUMBAR SPINE-W/O    (Results Pending)        Assessment/Plan  * Intractable back pain- (present on admission)  Assessment & Plan  Patient about a year ago had severe back pain.  The patient's back pain at that time was evaluated and patient had multiple lumbar vertebrae with disc protrusions and was recommended to have surgery by neurosurgeon Dr. Devries.  The patient at that point improved with his back pain and thus went home and never followed up with Dr. Devries.  The patient now had a trip to [place] that was  over 24 hours there and back and the patient developed severe back pain to the point where he is unable to ambulate or move.  Patient came back into the hospital for evaluation at this point the patient will had a CT done of the thoracic and lumbar spine which shows no fractures or abnormalities.  The patient at this point will need an MRI of the lumbar spine to evaluate possibility of surgical needs as the patient now has severe sciatica with radiation down the left leg and inability to ambulate or move.  Continue pain management  MRI pending  Possibly needs neurosurgical intervention.    Elevated blood pressure reading  Assessment & Plan  Secondary to the pain the patient has uncontrolled hypertension continue at this point with blood pressure management optimization.  Aim for keeping the systolic blood pressure less than 140 and the diastolic under 90.         VTE prophylaxis: SCDs/TEDs    I have performed a physical exam and reviewed and updated ROS and Plan today (8/15/2022). In review of yesterday's note (8/14/2022), there are no changes except as documented above.

## 2022-08-15 NOTE — DISCHARGE PLANNING
Case Management Discharge Planning    Admission Date: 8/14/2022  GMLOS:    ALOS: 0    6-Clicks ADL Score:    6-Clicks Mobility Score: 18      Anticipated Discharge Dispo: Discharge Disposition: Discharged to home/self care (01)    DME Needed: No    Action(s) Taken: Updated Provider/Nurse on Discharge Plan    Anticipate possible transfer to [facility], pending MRI.     Escalations Completed: None    Medically Clear: No    Next Steps: Pending procedures.     Barriers to Discharge: Medical clearance and Pending Procedures

## 2022-08-15 NOTE — ASSESSMENT & PLAN NOTE
-No alarm signs.  No saddle anesthesia, or urinary retention, and radicular signs are unilateral.  Also no high risk mechanism.  -Unfortunately, he is not able to get an MRI due to incompatibility of spine stimulator wires.  Discussed with Dr. Devries of neurosurgery, no medical necessity for urgent neurosurgical intervention, and this can be evaluated for as an outpatient.  CT myelogram only showed mild L4/L5 central stenosis related to disc bulging, and mild L4/L5/S1 foraminal stenosis, however interval decreased volume of intrathecal fluid prevents characterization of the nerve root/intrathecal contents from the mid L5 level caudally.-Continue pain management.  Continue as needed IV Dilaudid, and oral oxycodone.  -Continues to have frequent exacerbation of pain, mostly with movement.  Continue prednisone 60 mg daily.  Continue Neurontin, increase to 200 mg 3 times daily to help with neuropathic pain.  Continue OxyContin 30 mg twice daily for more sustained pain control.  Continue as needed Dilaudid and oxycodone for breakthrough pain.  -Continue PT/OT, I encouraged the patient to work extensively with them.

## 2022-08-16 ENCOUNTER — APPOINTMENT (OUTPATIENT)
Dept: RADIOLOGY | Facility: MEDICAL CENTER | Age: 47
DRG: 552 | End: 2022-08-16
Attending: HOSPITALIST
Payer: COMMERCIAL

## 2022-08-16 PROBLEM — M54.59 INTRACTABLE LOW BACK PAIN: Status: ACTIVE | Noted: 2022-08-16

## 2022-08-16 LAB
ANION GAP SERPL CALC-SCNC: 10 MMOL/L (ref 7–16)
BUN SERPL-MCNC: 14 MG/DL (ref 8–22)
CALCIUM SERPL-MCNC: 8.8 MG/DL (ref 8.4–10.2)
CHLORIDE SERPL-SCNC: 100 MMOL/L (ref 96–112)
CO2 SERPL-SCNC: 27 MMOL/L (ref 20–33)
CREAT SERPL-MCNC: 0.76 MG/DL (ref 0.5–1.4)
ERYTHROCYTE [DISTWIDTH] IN BLOOD BY AUTOMATED COUNT: 39.5 FL (ref 35.9–50)
GFR SERPLBLD CREATININE-BSD FMLA CKD-EPI: 112 ML/MIN/1.73 M 2
GLUCOSE SERPL-MCNC: 90 MG/DL (ref 65–99)
HCT VFR BLD AUTO: 43.3 % (ref 42–52)
HGB BLD-MCNC: 15.3 G/DL (ref 14–18)
INR PPP: 1.17 (ref 0.87–1.13)
MCH RBC QN AUTO: 33.2 PG (ref 27–33)
MCHC RBC AUTO-ENTMCNC: 35.3 G/DL (ref 33.7–35.3)
MCV RBC AUTO: 93.9 FL (ref 81.4–97.8)
PLATELET # BLD AUTO: 201 K/UL (ref 164–446)
PMV BLD AUTO: 9.6 FL (ref 9–12.9)
POTASSIUM SERPL-SCNC: 3.9 MMOL/L (ref 3.6–5.5)
PROTHROMBIN TIME: 14.4 SEC (ref 12–14.6)
RBC # BLD AUTO: 4.61 M/UL (ref 4.7–6.1)
SODIUM SERPL-SCNC: 137 MMOL/L (ref 135–145)
WBC # BLD AUTO: 5.3 K/UL (ref 4.8–10.8)

## 2022-08-16 PROCEDURE — 97161 PT EVAL LOW COMPLEX 20 MIN: CPT

## 2022-08-16 PROCEDURE — 80048 BASIC METABOLIC PNL TOTAL CA: CPT

## 2022-08-16 PROCEDURE — 85610 PROTHROMBIN TIME: CPT

## 2022-08-16 PROCEDURE — 770006 HCHG ROOM/CARE - MED/SURG/GYN SEMI*

## 2022-08-16 PROCEDURE — 85027 COMPLETE CBC AUTOMATED: CPT

## 2022-08-16 PROCEDURE — 94760 N-INVAS EAR/PLS OXIMETRY 1: CPT

## 2022-08-16 PROCEDURE — 36415 COLL VENOUS BLD VENIPUNCTURE: CPT

## 2022-08-16 PROCEDURE — 97140 MANUAL THERAPY 1/> REGIONS: CPT

## 2022-08-16 PROCEDURE — 99233 SBSQ HOSP IP/OBS HIGH 50: CPT | Performed by: INTERNAL MEDICINE

## 2022-08-16 PROCEDURE — 700111 HCHG RX REV CODE 636 W/ 250 OVERRIDE (IP): Performed by: HOSPITALIST

## 2022-08-16 PROCEDURE — 700111 HCHG RX REV CODE 636 W/ 250 OVERRIDE (IP): Performed by: INTERNAL MEDICINE

## 2022-08-16 PROCEDURE — 96376 TX/PRO/DX INJ SAME DRUG ADON: CPT

## 2022-08-16 RX ORDER — METHYLPREDNISOLONE SODIUM SUCCINATE 125 MG/2ML
62.5 INJECTION, POWDER, LYOPHILIZED, FOR SOLUTION INTRAMUSCULAR; INTRAVENOUS ONCE
Status: COMPLETED | OUTPATIENT
Start: 2022-08-16 | End: 2022-08-16

## 2022-08-16 RX ADMIN — HYDROMORPHONE HYDROCHLORIDE 1 MG: 1 INJECTION, SOLUTION INTRAMUSCULAR; INTRAVENOUS; SUBCUTANEOUS at 16:06

## 2022-08-16 RX ADMIN — METHYLPREDNISOLONE SODIUM SUCCINATE 62.5 MG: 125 INJECTION, POWDER, FOR SOLUTION INTRAMUSCULAR; INTRAVENOUS at 14:54

## 2022-08-16 RX ADMIN — HYDROMORPHONE HYDROCHLORIDE 1 MG: 1 INJECTION, SOLUTION INTRAMUSCULAR; INTRAVENOUS; SUBCUTANEOUS at 19:56

## 2022-08-16 RX ADMIN — HYDROMORPHONE HYDROCHLORIDE 1 MG: 1 INJECTION, SOLUTION INTRAMUSCULAR; INTRAVENOUS; SUBCUTANEOUS at 12:25

## 2022-08-16 RX ADMIN — HYDROMORPHONE HYDROCHLORIDE 1 MG: 1 INJECTION, SOLUTION INTRAMUSCULAR; INTRAVENOUS; SUBCUTANEOUS at 07:36

## 2022-08-16 RX ADMIN — HYDROMORPHONE HYDROCHLORIDE 1 MG: 1 INJECTION, SOLUTION INTRAMUSCULAR; INTRAVENOUS; SUBCUTANEOUS at 22:56

## 2022-08-16 ASSESSMENT — COGNITIVE AND FUNCTIONAL STATUS - GENERAL
STANDING UP FROM CHAIR USING ARMS: A LITTLE
MOVING FROM LYING ON BACK TO SITTING ON SIDE OF FLAT BED: A LITTLE
MOVING TO AND FROM BED TO CHAIR: A LITTLE
SUGGESTED CMS G CODE MODIFIER MOBILITY: CK
WALKING IN HOSPITAL ROOM: A LITTLE
MOBILITY SCORE: 19
CLIMB 3 TO 5 STEPS WITH RAILING: A LITTLE

## 2022-08-16 ASSESSMENT — GAIT ASSESSMENTS
GAIT LEVEL OF ASSIST: SUPERVISED
DISTANCE (FEET): 150
ASSISTIVE DEVICE: FRONT WHEEL WALKER
DEVIATION: STEP TO;ANTALGIC

## 2022-08-16 ASSESSMENT — PAIN DESCRIPTION - PAIN TYPE
TYPE: CHRONIC PAIN
TYPE: CHRONIC PAIN
TYPE: ACUTE PAIN;CHRONIC PAIN
TYPE: CHRONIC PAIN
TYPE: CHRONIC PAIN;ACUTE PAIN
TYPE: CHRONIC PAIN;ACUTE PAIN

## 2022-08-16 NOTE — DISCHARGE PLANNING
Case Management Discharge Planning    Admission Date: 8/14/2022  GMLOS:    ALOS: 0    6-Clicks ADL Score: 18  6-Clicks Mobility Score: 17  PT and/or OT Eval ordered: No  Post-acute Referrals Ordered: NA  Post-acute Choice Obtained: NA  Has referral(s) been sent to post-acute provider:  FELI      Anticipated Discharge Dispo: Discharge Disposition: Discharged to home/self care (01)    DME Needed: No    Action(s) Taken: Updated Provider/Nurse on Discharge Plan    1245: Per Marcelo, patient will need myelogram that can only be done at [facility].  Marcelo [place] has requested MARGARET.  RN CM spoke with IR  Mariana ([id number]), who states RN DANIEL should call Hannah MOYA CT supervisor ([phone]). RN CM attempted to call Hannah, no answer, VM left requesting call back. LEONARD SANCHEZ called Mariana back who states she will get in contact with Sophia, OP myelogram , and call RN DANIEL back.     1311: Sophia ([id number]) states myelogram can only be done on M,W,F and patient will require prior auth (PA) from [facility] prior to scheduling. Sophia states Hannah has stated that CT supervisor does not need to sign off for this procedure. LEONARD SANCHEZ reached out to Trinity MOYA RN, CM, HCM leader who states PA is not needed as this is an inpatient procedure. LEONARD SANCHEZ called Sophia back to notify her, no answer, VM left requesting call back. Josee yLnch RN notified of MARGARET.    1330: LEONARD SANCHEZ called Rola, imaging manager ([phone]). Rola states to schedule patient as OP myelogram and to ensure patient is on CT schedule as well as fluoroscopy schedule. RN CM attempted to call Sophia, no answer, VM left requesting call back.     1340: Sophia called LEONARD SANCHEZ back and has scheduled myelogram for tomorrow at 1300.  Patient should arrive 2 hours early. Elvia VALLE CM to set up transportation. Patient to check in at [facility] ground level. Sophia states no fried foods for 6 hours prior to procedure, RN notified.  MD notified of appointment time.  Awaiting to hear back from Mariana.     Escalations  Completed: None    Medically Clear: No    Next Steps: Transfer to [facility].     Barriers to Discharge: Medical clearance and Pending procedures.

## 2022-08-16 NOTE — PROCEDURES
Per [employer] PT's device does not meet  safety requirements for MRI due to the length of the current leads for his stimulator. PT unable to get MRI at this time.

## 2022-08-16 NOTE — DISCHARGE PLANNING
Received notification from Cuong and MD Robertson no need for MARGARET at this time. Radiology coming to [facility] to complete. DANIEL RN called  Sophia [phone] to notify.

## 2022-08-16 NOTE — CARE PLAN
The patient is Stable - Low risk of patient condition declining or worsening    Shift Goals MRI and pain management  Clinical Goals: Pain management with movement  Patient Goals: Sleep comfortably    Progress made toward(s) clinical / shift goals:  Pain-standard    Patient is not progressing towards the following goals:

## 2022-08-16 NOTE — THERAPY
Physical Therapy   Initial Evaluation     Patient Name: Jarod Fields  Age:  47 y.o., Sex:  male  Medical Record #: [id number]  Today's Date: 8/16/2022     Precautions  Precautions: Spinal / Back Precautions     Assessment  Patient is 47 y.o. male who was recently admitted with low back pain with L LE radiating pain. Pt has a hx of multiple disc protrusions in lumbar area. Pt states he has experienced back pain before, however, not this intense. Pt states he did have a fall at home in shower as his L LE buckled underneath him. Pt was provided with 30 seconds traction for 5-6 reps and pt reported of slight improvement of pain. Pt was provided with education regarding ambulation, positioning, spinal precautions, and log rolling in and out of bed. Pt was able to verbalize good understanding of precautions/education and demonstrate appropriate mechanics for bed mobility. Pt reported of improved pain during ambulation with use of FWW and was encouraged to mobilize with nsg staff and use FWW to promote functional mobility. Pt attempted to offload B LE using FWW to allow for traction of low back, however, this appears to worsen the pain. Pt was provided with ice and hot packs to assist with pain as the pt states pain appears to improve in combination with ice and hot packs. Recommend outpatient physical therapy services to address higher level deficits.    Plan    Recommend Physical Therapy 4 times per week until therapy goals are met for the following treatments:  Bed Mobility, Equipment, Gait Training, Manual Therapy, Neuro Re-Education / Balance, Self Care/Home Evaluation, Stair Training, Therapeutic Activities, and Therapeutic Exercises    DC Equipment Recommendations: Front-Wheel Walker  Discharge Recommendations: Recommend outpatient physical therapy services to address higher level deficits     Objective       08/16/22 1550   Initial Contact Note    Initial Contact Note Order Received and Verified, Physical  Therapy Evaluation in Progress with Full Report to Follow.   Precautions   Precautions Spinal / Back Precautions    Pain 0 - 10 Group   Location Back;Generalized;Leg   Location Orientation Lower;Left   Description Aching;Radiating;Sharp;Shooting   Therapist Pain Assessment During Activity;Prior to Activity;Post Activity;Nurse Notified;9   Prior Living Situation   Prior Services None   Housing / Facility 1 Story House   Steps Into Home 2   Steps In Home 0   Equipment Owned None   Lives with - Patient's Self Care Capacity Spouse   Comments pt states spouse can assist upon d/c to home   Prior Level of Functional Mobility   Bed Mobility Independent   Transfer Status Independent   Ambulation Independent   Distance Ambulation (Feet)   (community)   Assistive Devices Used None   Stairs Independent   History of Falls   History of Falls Yes   Date of Last Fall   (states of fall in shower; L LE buckled)   Cognition    Cognition / Consciousness WDL   Comments pleasant/cooperative   Passive ROM Lower Body   Passive ROM Lower Body X   Comments limited in L LE due to shooting pain with hip extension, flexion appears to be relieaving   Active ROM Lower Body    Active ROM Lower Body  X   Comments same as above   Strength Lower Body   Lower Body Strength  X   Comments limited in L LE due to pain and weakness; no buckling noted during ambulation, however, states of L LE bucking in shower at home; presents with gross strength of 4/ 5 in L LE   Sensation Lower Body   Lower Extremity Sensation   X   Comments reports of dec sensation in L LE vs. R LE; reports of numbness of entire L LE   Lower Body Muscle Tone   Lower Body Muscle Tone  WDL   Strength Upper Body   Upper Body Strength  WDL   Upper Body Muscle Tone   Upper Body Muscle Tone  WDL   Neurological Concerns   Neurological Concerns No   Coordination Upper Body   Coordination WDL   Coordination Lower Body    Coordination Lower Body  WDL   Balance Assessment   Sitting Balance  (Static) Good   Sitting Balance (Dynamic) Fair +   Standing Balance (Static) Good   Standing Balance (Dynamic) Fair +   Weight Shift Sitting Good   Weight Shift Standing Fair   Comments w/fww   Gait Analysis   Gait Level Of Assist Supervised   Assistive Device Front Wheel Walker   Distance (Feet) 150   # of Times Distance was Traveled 1   Deviation Step To;Antalgic   Weight Bearing Status fwb   Bed Mobility    Supine to Sit Standby Assist   Sit to Supine Standby Assist   Scooting Standby Assist   Comments HOB flat and rails up; provided with cues and demo for logrolling   Functional Mobility   Sit to Stand Standby Assist   Bed, Chair, Wheelchair Transfer Standby Assist   Transfer Method Stand Step   Mobility EOB, sit<>stand, ambulation, back to bed   How much difficulty does the patient currently have...   Turning over in bed (including adjusting bedclothes, sheets and blankets)? 4   Sitting down on and standing up from a chair with arms (e.g., wheelchair, bedside commode, etc.) 3   Moving from lying on back to sitting on the side of the bed? 3   How much help from another person does the patient currently need...   Moving to and from a bed to a chair (including a wheelchair)? 3   Need to walk in a hospital room? 3   Climbing 3-5 steps with a railing? 3   6 clicks Mobility Score 19   Activity Tolerance   Sitting in Chair NT   Sitting Edge of Bed 5 mins   Standing 8 mins   Comments limited due to pain   Edema / Skin Assessment   Edema / Skin  Not Assessed   Patient / Family Goals    Patient / Family Goal #1 to go home   Short Term Goals    Short Term Goal # 1 pt will go sit<>stand w/fww w/Mod I in 6tx for safe d/c home   Short Term Goal # 2 pt will ambulate for 150ft w/fww w/Mod I in 6tx for safe d/c home   Short Term Goal # 3 pt will go up/down 2 steps w/LRD w/Mod I in 6tx for safe d/c home   Education Group   Education Provided Role of Physical Therapist;Spine Precautions   Spine Precautions Patient Response  Patient;Acceptance;Explanation;Demonstration;Verbal Demonstration;Action Demonstration   Role of Physical Therapist Patient Response Patient;Acceptance;Explanation;Demonstration;Verbal Demonstration;Action Demonstration   Problem List    Problems Pain;Impaired Bed Mobility;Impaired Transfers;Impaired Ambulation;Impaired Balance;Decreased Activity Tolerance   Anticipated Discharge Equipment and Recommendations   DC Equipment Recommendations Front-Wheel Walker   Discharge Recommendations Recommend outpatient physical therapy services to address higher level deficits   Interdisciplinary Plan of Care Collaboration   IDT Collaboration with  Nursing   Patient Position at End of Therapy In Bed;Call Light within Reach;Tray Table within Reach;Phone within Reach   Collaboration Comments aware of visit and recs   Session Information   Date / Session Number  8/16-1 (1/4, 8/22)

## 2022-08-16 NOTE — PROGRESS NOTES
Hospital Medicine Daily Progress Note    Date of Service  8/16/2022    Chief Complaint  low back pain    Hospital Course  Sampson is a 47-year-old gentleman with complex regional pain syndrome, comes into the hospital complaining of low back pain after he took a trip to [place].  About a year ago he had the same issue at which point he was found to have multiple lumbar vertebrae with disc protrusion and he was recommended to have neurosurgical intervention right away.  He never followed up with them and the pain disappeared at that time.  The pain now is back 10 out of 10 and is rating down the left leg.  The pain is unable to be relieved.  Patient at this point will need an MRI to evaluate the degree of injury and then possible neurosurgical intervention will need to be planned.    Interval Problem Update  8/16/2022 - I reviewed the patient's chart. There were no significant overnight events. Remains hemodynamically stable and afebrile. Stable on RA.  CBC and BMP were unremarkable today.  CT of the thoracic spine showed no acute traumatic bone injury.  CT of the lumbar spine also showed no acute traumatic bone injury, with right nephrolithiasis without visualized obstructive changes.  MRI unable to be done due to incompatibility of the spine stimulator wires per [facility].  I discussed the case with Dr. Devries of neurosurgery, who recommended conservative management with pain control, PT/OT, and then outpatient follow-up with him in the office.  He gave the opinion that there is no medical necessity for urgent neurosurgical intervention.    > I have personally seen and examined the patient today.  He continues to have low mid back pain, rated 8-9 out of 10, accompanied with shooting pain to the left thigh, along with intermittent numbness, exacerbated by moving his legs.  He denies any leg weakness or any other neurologic complaints.  He had no bowel or urinary incontinence, and had bowel movement yesterday.   No other complaint such as nausea, vomiting, abdominal pain, fevers or chills.      I have discussed this patient's plan of care and discharge plan at IDT rounds today with Case Management, Nursing, Nursing leadership, and other members of the IDT team.    Consultants/Specialty  None    Code Status  Full Code    Disposition  Patient is not medically cleared for discharge.   Anticipate discharge to to home with close outpatient follow-up. PT/OT pending. ?transfer to [facility] pending MRI results.  I have placed the appropriate orders for post-discharge needs.    Review of Systems  ROS     Pertinent positives/negatives as mentioned above.     A complete review of systems was personally done by me. All other systems were negative.       Physical Exam  Temp:  [36.4 °C (97.6 °F)-37.1 °C (98.7 °F)] 37.1 °C (98.7 °F)  Pulse:  [51-83] 63  Resp:  [18-20] 18  BP: (118-144)/(67-94) 133/92  SpO2:  [91 %-97 %] 96 %    Physical Exam  Vitals and nursing note reviewed. Exam conducted with a chaperone present.   Constitutional:       Appearance: He is well-developed. He is obese. He is ill-appearing and diaphoretic.   HENT:      Head: Normocephalic and atraumatic.      Right Ear: External ear normal.      Left Ear: External ear normal.      Nose: Nose normal.      Mouth/Throat:      Mouth: Mucous membranes are moist.      Pharynx: Oropharynx is clear.   Eyes:      Extraocular Movements: Extraocular movements intact.      Conjunctiva/sclera: Conjunctivae normal.      Pupils: Pupils are equal, round, and reactive to light.   Neck:      Thyroid: No thyromegaly.      Vascular: No JVD.   Cardiovascular:      Rate and Rhythm: Normal rate and regular rhythm.      Pulses: Normal pulses.      Heart sounds: Normal heart sounds.   Pulmonary:      Effort: Pulmonary effort is normal.      Breath sounds: Normal breath sounds.   Chest:      Chest wall: No tenderness.   Abdominal:      General: Abdomen is flat. Bowel sounds are normal. There is no  distension.      Palpations: There is no mass.      Tenderness: There is no abdominal tenderness. There is no guarding or rebound.   Musculoskeletal:      Cervical back: Normal range of motion and neck supple.      Lumbar back: Tenderness present. Decreased range of motion.   Lymphadenopathy:      Cervical: No cervical adenopathy.   Skin:     General: Skin is warm.      Capillary Refill: Capillary refill takes less than 2 seconds.      Coloration: Skin is not jaundiced.      Findings: No lesion or rash.   Neurological:      Mental Status: He is alert and oriented to person, place, and time.      Cranial Nerves: No cranial nerve deficit.      Deep Tendon Reflexes: Reflexes are normal and symmetric.   Psychiatric:         Mood and Affect: Mood normal.         Behavior: Behavior normal.         Thought Content: Thought content normal.         Judgment: Judgment normal.       I have performed the physical examination today 8/16/2022.  In review of yesterday's note, there are no new changes except as documented above.      Fluids    Intake/Output Summary (Last 24 hours) at 8/16/2022 1257  Last data filed at 8/16/2022 0700  Gross per 24 hour   Intake 840 ml   Output 1200 ml   Net -360 ml       Laboratory  Recent Labs     08/15/22  0531 08/16/22  0221   WBC 5.5 5.3   RBC 4.53* 4.61*   HEMOGLOBIN 15.0 15.3   HEMATOCRIT 42.4 43.3   MCV 93.6 93.9   MCH 33.1* 33.2*   MCHC 35.4* 35.3   RDW 40.7 39.5   PLATELETCT 193 201   MPV 9.0 9.6     Recent Labs     08/15/22  0531 08/16/22  0221   SODIUM 140 137   POTASSIUM 4.0 3.9   CHLORIDE 106 100   CO2 22 27   GLUCOSE 94 90   BUN 15 14   CREATININE 0.68 0.76   CALCIUM 8.6 8.8                   Imaging  CT-TSPINE W/O PLUS RECONS   Final Result         1.  No acute traumatic bony injury of the thoracic spine.      CT-LSPINE W/O PLUS RECONS   Final Result         1.  No acute traumatic bony injury of the lumbar spine.   2.  Right nephrolithiasis without visualized obstructive changes       OUTSIDE IMAGES-CT LUMBAR SPINE    (Results Pending)   OUTSIDE IMAGES-CT THORACIC SPINE    (Results Pending)   MR-LUMBAR SPINE-W/O    (Results Pending)   DX-MYELOGRAPHY VIA LUMBAR INJ LUMBOSACRAL    (Results Pending)        Assessment/Plan  * Intractable back pain with lumbosacral radiculopathy- (present on admission)  Assessment & Plan  No alarm signs.  No saddle anesthesia, or urinary retention, and radicular signs are unilateral.  Also no high risk mechanism.  Unfortunately, he is not able to get an MRI due to incompatibility of spine stimulator wires.  Discussed with Dr. Devries of neurosurgery, will get CT myelogram as an alternative. No medical necessity for urgent neurosurgical intervention, and this can be evaluated for as an outpatient.  Will get CT myelogram. I was told that CT myelogram is not done at [facility], and will need a leave of absence to get that done at the [facility]. Discussed with CM to start process.  Continue pain management.  Continue as needed IV Dilaudid, and oral oxycodone.  Start systemic glucocorticoid with solumedrol 62.5mg IV x 1 followed by prednisone 60mg daily x 5 days.   PT/OT eval.           Elevated blood pressure reading- (present on admission)  Assessment & Plan  Secondary to the pain.  He does not have an established diagnosis of hypertension.  Continue control pain which should improve blood pressure control.  Monitor blood pressure trend closely.       VTE prophylaxis: SCDs/TEDs

## 2022-08-17 ENCOUNTER — APPOINTMENT (OUTPATIENT)
Dept: RADIOLOGY | Facility: MEDICAL CENTER | Age: 47
DRG: 552 | End: 2022-08-17
Attending: HOSPITALIST
Payer: COMMERCIAL

## 2022-08-17 ENCOUNTER — APPOINTMENT (OUTPATIENT)
Dept: RADIOLOGY | Facility: MEDICAL CENTER | Age: 47
DRG: 552 | End: 2022-08-17
Attending: INTERNAL MEDICINE
Payer: COMMERCIAL

## 2022-08-17 PROCEDURE — 94760 N-INVAS EAR/PLS OXIMETRY 1: CPT

## 2022-08-17 PROCEDURE — 97535 SELF CARE MNGMENT TRAINING: CPT

## 2022-08-17 PROCEDURE — 62284 INJECTION FOR MYELOGRAM: CPT

## 2022-08-17 PROCEDURE — 72132 CT LUMBAR SPINE W/DYE: CPT

## 2022-08-17 PROCEDURE — 99232 SBSQ HOSP IP/OBS MODERATE 35: CPT | Performed by: INTERNAL MEDICINE

## 2022-08-17 PROCEDURE — 700111 HCHG RX REV CODE 636 W/ 250 OVERRIDE (IP): Performed by: INTERNAL MEDICINE

## 2022-08-17 PROCEDURE — A9270 NON-COVERED ITEM OR SERVICE: HCPCS | Performed by: HOSPITALIST

## 2022-08-17 PROCEDURE — 700102 HCHG RX REV CODE 250 W/ 637 OVERRIDE(OP): Performed by: HOSPITALIST

## 2022-08-17 PROCEDURE — 700111 HCHG RX REV CODE 636 W/ 250 OVERRIDE (IP): Performed by: HOSPITALIST

## 2022-08-17 PROCEDURE — 700117 HCHG RX CONTRAST REV CODE 255: Performed by: INTERNAL MEDICINE

## 2022-08-17 PROCEDURE — 770006 HCHG ROOM/CARE - MED/SURG/GYN SEMI*

## 2022-08-17 RX ORDER — LIDOCAINE HYDROCHLORIDE 10 MG/ML
INJECTION, SOLUTION INFILTRATION; PERINEURAL
Status: DISCONTINUED
Start: 2022-08-17 | End: 2022-08-17

## 2022-08-17 RX ADMIN — HYDROMORPHONE HYDROCHLORIDE 1 MG: 1 INJECTION, SOLUTION INTRAMUSCULAR; INTRAVENOUS; SUBCUTANEOUS at 01:59

## 2022-08-17 RX ADMIN — HYDROMORPHONE HYDROCHLORIDE 1 MG: 1 INJECTION, SOLUTION INTRAMUSCULAR; INTRAVENOUS; SUBCUTANEOUS at 16:42

## 2022-08-17 RX ADMIN — HYDROMORPHONE HYDROCHLORIDE 1 MG: 1 INJECTION, SOLUTION INTRAMUSCULAR; INTRAVENOUS; SUBCUTANEOUS at 08:40

## 2022-08-17 RX ADMIN — IOHEXOL 10 ML: 180 INJECTION INTRAVENOUS at 11:44

## 2022-08-17 RX ADMIN — OXYCODONE HYDROCHLORIDE 10 MG: 10 TABLET ORAL at 21:43

## 2022-08-17 RX ADMIN — HYDROMORPHONE HYDROCHLORIDE 1 MG: 1 INJECTION, SOLUTION INTRAMUSCULAR; INTRAVENOUS; SUBCUTANEOUS at 13:12

## 2022-08-17 RX ADMIN — HYDROMORPHONE HYDROCHLORIDE 1 MG: 1 INJECTION, SOLUTION INTRAMUSCULAR; INTRAVENOUS; SUBCUTANEOUS at 20:13

## 2022-08-17 RX ADMIN — HYDROMORPHONE HYDROCHLORIDE 1 MG: 1 INJECTION, SOLUTION INTRAMUSCULAR; INTRAVENOUS; SUBCUTANEOUS at 05:31

## 2022-08-17 RX ADMIN — PREDNISONE 60 MG: 50 TABLET ORAL at 05:31

## 2022-08-17 RX ADMIN — HYDROMORPHONE HYDROCHLORIDE 1 MG: 1 INJECTION, SOLUTION INTRAMUSCULAR; INTRAVENOUS; SUBCUTANEOUS at 23:56

## 2022-08-17 ASSESSMENT — PAIN DESCRIPTION - PAIN TYPE
TYPE: ACUTE PAIN
TYPE: CHRONIC PAIN
TYPE: CHRONIC PAIN
TYPE: ACUTE PAIN
TYPE: CHRONIC PAIN
TYPE: ACUTE PAIN
TYPE: ACUTE PAIN

## 2022-08-17 ASSESSMENT — ACTIVITIES OF DAILY LIVING (ADL): TOILETING: INDEPENDENT

## 2022-08-17 NOTE — CARE PLAN
The patient is Stable - Low risk of patient condition declining or worsening    Shift Goals  Clinical Goals: pt will remain at stated comfort goal of 5/10 pain this shift. pt will not sustain a fall this shift.  Patient Goals: pain management  Family Goals: n/a    Progress made toward(s) clinical / shift goals:      Pt required multiple doses of dilaudid, heat packs, and frequent repositioning overnight to manage pain. Pt states pain has been tolerable overnight.      Problem: Pain - Standard  Goal: Alleviation of pain or a reduction in pain to the patient’s comfort goal  Outcome: Progressing     Problem: Knowledge Deficit - Standard  Goal: Patient and family/care givers will demonstrate understanding of plan of care, disease process/condition, diagnostic tests and medications  Outcome: Progressing       Patient is not progressing towards the following goals:

## 2022-08-17 NOTE — CARE PLAN
The patient is Stable - Low risk of patient condition declining or worsening    Shift Goals  Clinical Goals: Patient will have CT myelogram complete today  Patient Goals: Patient will experience a decrease in pain  Family Goals: n/a    Progress made toward(s) clinical / shift goals:  Patient's CT myelogram is complete.    Patient is not progressing towards the following goals: Patient's pain is not well controlled with IV pain medication per MAR. Dr. Robertson aware. Discussed non pharmacological pain relief methods with patient, such as hot/cold packs, repositioning with pillows, distraction, etc.       Problem: Pain - Standard  Goal: Alleviation of pain or a reduction in pain to the patient’s comfort goal  8/17/2022 1304 by Josee Rich RTavonNTavon  Outcome: Not Progressing

## 2022-08-17 NOTE — PROGRESS NOTES
Hospital Medicine Daily Progress Note    Date of Service  8/17/2022    Chief Complaint  low back pain    Hospital Course  Sampson is a 47-year-old gentleman with complex regional pain syndrome, comes into the hospital complaining of low back pain after he took a trip to [place].  About a year ago he had the same issue at which point he was found to have multiple lumbar vertebrae with disc protrusion and he was recommended to have neurosurgical intervention right away.  He never followed up with them and the pain disappeared at that time.  The pain now is back 10 out of 10 and is rating down the left leg.  The pain is unable to be relieved.  CT of the thoracic spine showed no acute traumatic bone injury.  CT of the lumbar spine also showed no acute traumatic bone injury, with right nephrolithiasis without visualized obstructive changes.  MRI unable to be done due to incompatibility of the spine stimulator wires per [facility].  CT myelogram is being pursued instead.  I discussed the case with Dr. Devries of neurosurgery, who recommended conservative management with pain control, PT/OT, and then outpatient follow-up with him in the office.  He gave the opinion that there is no medical necessity for urgent neurosurgical intervention.  He is started on systemic corticosteroids.  PT/OT were ordered as well.    Interval Problem Update  8/17/2022 - I reviewed the patient's chart today. Uneventful night. VSS. Afebrile. Saturating well on RA.      > I have personally seen and examined the patient today.  Low back pain is a little better today, and feels that the steroid is definitely helping.  He is just concerned about the effect this to his current job.  Otherwise he has no other complaints.  He is breathing well.  No GI complaints.  No nausea, vomiting, abdominal symptoms.  No new neurologic complaints.    I have discussed this patient's plan of care and discharge plan at IDT rounds today with Case Management,  Nursing, Nursing leadership, and other members of the IDT team.    Consultants/Specialty  None    Code Status  Full Code    Disposition  Patient is not medically cleared for discharge.   Anticipate discharge to to home with close outpatient follow-up. PT/OT while in-house. I have placed the appropriate orders for post-discharge needs.    Review of Systems  ROS     Pertinent positives/negatives as mentioned above.     A complete review of systems was personally done by me. All other systems were negative.       Physical Exam  Temp:  [36.7 °C (98 °F)-37.1 °C (98.7 °F)] 36.7 °C (98.1 °F)  Pulse:  [51-88] 85  Resp:  [16-19] 16  BP: (128-154)/(68-98) 142/68  SpO2:  [93 %-98 %] 96 %    Physical Exam  Vitals and nursing note reviewed. Exam conducted with a chaperone present.   Constitutional:       General: He is not in acute distress.     Appearance: Normal appearance. He is well-developed. He is obese. He is not toxic-appearing or diaphoretic.   HENT:      Head: Normocephalic and atraumatic.      Right Ear: External ear normal.      Left Ear: External ear normal.      Nose: Nose normal.      Mouth/Throat:      Mouth: Mucous membranes are moist.      Pharynx: Oropharynx is clear. No oropharyngeal exudate.   Eyes:      General: No scleral icterus.     Extraocular Movements: Extraocular movements intact.      Conjunctiva/sclera: Conjunctivae normal.      Pupils: Pupils are equal, round, and reactive to light.   Neck:      Thyroid: No thyromegaly.      Vascular: No JVD.   Cardiovascular:      Rate and Rhythm: Normal rate and regular rhythm.      Pulses: Normal pulses.      Heart sounds: Normal heart sounds. No murmur heard.    No gallop.   Pulmonary:      Effort: Pulmonary effort is normal. No respiratory distress.      Breath sounds: Normal breath sounds. No stridor. No wheezing, rhonchi or rales.   Chest:      Chest wall: No tenderness.   Abdominal:      General: Abdomen is flat. Bowel sounds are normal. There is no  distension.      Palpations: Abdomen is soft. There is no mass.      Tenderness: There is no abdominal tenderness. There is no guarding or rebound.   Musculoskeletal:         General: No swelling.      Cervical back: Normal range of motion and neck supple.      Comments: Improved tenderness on the left lower back, with better range of motion of the left leg   Lymphadenopathy:      Cervical: No cervical adenopathy.   Skin:     General: Skin is warm.      Capillary Refill: Capillary refill takes less than 2 seconds.      Coloration: Skin is not jaundiced.      Findings: No lesion or rash.   Neurological:      General: No focal deficit present.      Mental Status: He is alert and oriented to person, place, and time.      Cranial Nerves: No cranial nerve deficit.      Deep Tendon Reflexes: Reflexes are normal and symmetric.   Psychiatric:         Mood and Affect: Mood normal.         Behavior: Behavior normal.         Thought Content: Thought content normal.         Judgment: Judgment normal.         Fluids    Intake/Output Summary (Last 24 hours) at 8/17/2022 1133  Last data filed at 8/17/2022 0758  Gross per 24 hour   Intake 1700 ml   Output 300 ml   Net 1400 ml       Laboratory  Recent Labs     08/15/22  0531 08/16/22  0221   WBC 5.5 5.3   RBC 4.53* 4.61*   HEMOGLOBIN 15.0 15.3   HEMATOCRIT 42.4 43.3   MCV 93.6 93.9   MCH 33.1* 33.2*   MCHC 35.4* 35.3   RDW 40.7 39.5   PLATELETCT 193 201   MPV 9.0 9.6     Recent Labs     08/15/22  0531 08/16/22  0221   SODIUM 140 137   POTASSIUM 4.0 3.9   CHLORIDE 106 100   CO2 22 27   GLUCOSE 94 90   BUN 15 14   CREATININE 0.68 0.76   CALCIUM 8.6 8.8     Recent Labs     08/16/22  1627   INR 1.17*               Imaging  CT-TSPINE W/O PLUS RECONS   Final Result         1.  No acute traumatic bony injury of the thoracic spine.      CT-LSPINE W/O PLUS RECONS   Final Result         1.  No acute traumatic bony injury of the lumbar spine.   2.  Right nephrolithiasis without visualized  obstructive changes      OUTSIDE IMAGES-CT LUMBAR SPINE    (Results Pending)   OUTSIDE IMAGES-CT THORACIC SPINE    (Results Pending)   MR-LUMBAR SPINE-W/O    (Results Pending)   DX-LUMBAR PUNCTURE FOR CT ONLY    (Results Pending)   CT-LSPINE WITH PLUS RECONS    (Results Pending)        Assessment/Plan  * Intractable back pain with lumbosacral radiculopathy- (present on admission)  Assessment & Plan  -No alarm signs.  No saddle anesthesia, or urinary retention, and radicular signs are unilateral.  Also no high risk mechanism.  -Unfortunately, he is not able to get an MRI due to incompatibility of spine stimulator wires.  Discussed with Dr. Devries of neurosurgery, no medical necessity for urgent neurosurgical intervention, and this can be evaluated for as an outpatient.  Pending CT myelogram as an alternative to MRI to rule-out acute pathology. Discussed with IR (Dr. Tenorio), who will do it here at [facility].  -Continue pain management.  Continue as needed IV Dilaudid, and oral oxycodone.  -He is doing better with systemic local corticoid.  Continue prednisone 60 mg daily for 5 days.  -Continue PT/OT, I encouraged the patient to work extensively with them.          Elevated blood pressure reading- (present on admission)  Assessment & Plan  -Secondary to the pain.  He does not have an established diagnosis of hypertension.  -Continue control pain which should improve blood pressure control.  Monitor blood pressure trend closely.  If elevated blood pressure persists despite adequate pain control, consider starting low-dose antihypertensives.       VTE prophylaxis: SCDs/TEDs

## 2022-08-17 NOTE — CARE PLAN
The patient is Watcher - Medium risk of patient condition declining or worsening    Shift Goals  Clinical Goals: pain managment, up and ambulatory, MRI completed  Patient Goals: pain management  Family Goals: n/a    Progress made toward(s) clinical / shift goals:  Pain managed with PRN medications, good results. Pt up and ambulatory with PT. Will continue to monitor.     Patient is not progressing towards the following goals:

## 2022-08-17 NOTE — THERAPY
Occupational Therapy Contact Note      Patient Name: Jarod Fields  Age:  47 y.o., Sex:  male  Medical Record #: [id number]  Today's Date: 8/17/2022    OT carmelo attempted, pt reported he is on bed rest for 2 hours after imaging and a shot (unclear, and RN unaware). Pt reported occasional difficulty with toileting, and concern for working (travels often). Provided extensive education on spinal precautions, DME recommendations, and maintaining neurtral spinal alignment during ADLs and work. Full OT evaluation needed.      08/17/22 1416   Prior Living Situation   Prior Services None   Housing / Facility [address]   Bathroom Set up Walk In Shower;Bathtub / Shower Combination   Lives with - Patient's Self Care Capacity Spouse   Comments Pt lives with spouse and two kids (10 and 13). Pt travels often for work and a .   Prior Level of ADL Function   Self Feeding Independent   Grooming / Hygiene Independent   Bathing Independent   Dressing Independent   Toileting Independent   Prior Level of IADL Function   Medication Management Independent   Laundry Independent   Kitchen Mobility Independent   Finances Independent   Home Management Independent   Shopping Independent   Prior Level Of Mobility Independent Without Device in Community   History of Falls   History of Falls Yes   Precautions   Precautions Spinal / Back Precautions    Education Group   Education Provided Back Safety;Activities of Daily Living   Role of Occupational Therapist Patient Response Patient;Acceptance;Explanation   Back Safety Patient Response Patient;Acceptance;Explanation;Handout   ADL Patient Response Patient;Acceptance;Explanation;Handout   Interdisciplinary Plan of Care Collaboration   IDT Collaboration with  Nursing   Patient Position at End of Therapy In Bed;Call Light within Reach;Tray Table within Reach;Phone within Reach   Collaboration Comments OT findings and recs

## 2022-08-17 NOTE — PROGRESS NOTES
Pt is awake in bed. Pt stated back pain is tolerable at this time at 5/10 pain. RN reviewed PRN medications for the evening. Pt verbalized understanding. All questions answered. Pt is requesting to rest. Fall precautions in place.

## 2022-08-18 PROCEDURE — A9270 NON-COVERED ITEM OR SERVICE: HCPCS | Performed by: HOSPITALIST

## 2022-08-18 PROCEDURE — 700111 HCHG RX REV CODE 636 W/ 250 OVERRIDE (IP): Performed by: HOSPITALIST

## 2022-08-18 PROCEDURE — 97535 SELF CARE MNGMENT TRAINING: CPT

## 2022-08-18 PROCEDURE — 700102 HCHG RX REV CODE 250 W/ 637 OVERRIDE(OP): Performed by: INTERNAL MEDICINE

## 2022-08-18 PROCEDURE — 770006 HCHG ROOM/CARE - MED/SURG/GYN SEMI*

## 2022-08-18 PROCEDURE — 94760 N-INVAS EAR/PLS OXIMETRY 1: CPT

## 2022-08-18 PROCEDURE — 700111 HCHG RX REV CODE 636 W/ 250 OVERRIDE (IP): Performed by: INTERNAL MEDICINE

## 2022-08-18 PROCEDURE — A9270 NON-COVERED ITEM OR SERVICE: HCPCS | Performed by: INTERNAL MEDICINE

## 2022-08-18 PROCEDURE — 99232 SBSQ HOSP IP/OBS MODERATE 35: CPT | Performed by: INTERNAL MEDICINE

## 2022-08-18 PROCEDURE — 700102 HCHG RX REV CODE 250 W/ 637 OVERRIDE(OP): Performed by: HOSPITALIST

## 2022-08-18 RX ORDER — OXYCODONE HCL 40 MG/1
40 TABLET, FILM COATED, EXTENDED RELEASE ORAL EVERY 12 HOURS
Status: DISCONTINUED | OUTPATIENT
Start: 2022-08-18 | End: 2022-08-18

## 2022-08-18 RX ORDER — HYDROMORPHONE HYDROCHLORIDE 1 MG/ML
1 INJECTION, SOLUTION INTRAMUSCULAR; INTRAVENOUS; SUBCUTANEOUS ONCE
Status: COMPLETED | OUTPATIENT
Start: 2022-08-18 | End: 2022-08-18

## 2022-08-18 RX ORDER — GABAPENTIN 100 MG/1
100 CAPSULE ORAL 3 TIMES DAILY
Status: DISCONTINUED | OUTPATIENT
Start: 2022-08-18 | End: 2022-08-19

## 2022-08-18 RX ADMIN — GABAPENTIN 100 MG: 100 CAPSULE ORAL at 11:59

## 2022-08-18 RX ADMIN — HYDROMORPHONE HYDROCHLORIDE 1 MG: 1 INJECTION, SOLUTION INTRAMUSCULAR; INTRAVENOUS; SUBCUTANEOUS at 23:51

## 2022-08-18 RX ADMIN — OXYCODONE HYDROCHLORIDE 30 MG: 20 TABLET, FILM COATED, EXTENDED RELEASE ORAL at 17:11

## 2022-08-18 RX ADMIN — HYDROMORPHONE HYDROCHLORIDE 1 MG: 1 INJECTION, SOLUTION INTRAMUSCULAR; INTRAVENOUS; SUBCUTANEOUS at 20:06

## 2022-08-18 RX ADMIN — HYDROMORPHONE HYDROCHLORIDE 1 MG: 1 INJECTION, SOLUTION INTRAMUSCULAR; INTRAVENOUS; SUBCUTANEOUS at 03:47

## 2022-08-18 RX ADMIN — OXYCODONE HYDROCHLORIDE 10 MG: 10 TABLET ORAL at 21:44

## 2022-08-18 RX ADMIN — HYDROMORPHONE HYDROCHLORIDE 1 MG: 1 INJECTION, SOLUTION INTRAMUSCULAR; INTRAVENOUS; SUBCUTANEOUS at 08:22

## 2022-08-18 RX ADMIN — PREDNISONE 60 MG: 50 TABLET ORAL at 05:03

## 2022-08-18 RX ADMIN — OXYCODONE HYDROCHLORIDE 10 MG: 10 TABLET ORAL at 09:45

## 2022-08-18 RX ADMIN — HYDROMORPHONE HYDROCHLORIDE 1 MG: 1 INJECTION, SOLUTION INTRAMUSCULAR; INTRAVENOUS; SUBCUTANEOUS at 11:59

## 2022-08-18 RX ADMIN — HYDROMORPHONE HYDROCHLORIDE 1 MG: 1 INJECTION, SOLUTION INTRAMUSCULAR; INTRAVENOUS; SUBCUTANEOUS at 16:01

## 2022-08-18 RX ADMIN — GABAPENTIN 100 MG: 100 CAPSULE ORAL at 17:11

## 2022-08-18 RX ADMIN — OXYCODONE HYDROCHLORIDE 10 MG: 10 TABLET ORAL at 17:14

## 2022-08-18 RX ADMIN — HYDROMORPHONE HYDROCHLORIDE 1 MG: 1 INJECTION, SOLUTION INTRAMUSCULAR; INTRAVENOUS; SUBCUTANEOUS at 09:47

## 2022-08-18 ASSESSMENT — PAIN DESCRIPTION - PAIN TYPE
TYPE: ACUTE PAIN
TYPE: ACUTE PAIN

## 2022-08-18 ASSESSMENT — PAIN SCALES - WONG BAKER
WONGBAKER_NUMERICALRESPONSE: HURTS A LITTLE MORE
WONGBAKER_NUMERICALRESPONSE: HURTS EVEN MORE

## 2022-08-18 NOTE — PROGRESS NOTES
Received bedside report from warren chandrat RN at 19:15. Assumed pt care.   Pt seen AO4, requiring aggressive pain management, pain rated at 9/10. Administered pain meds per mar (Dilaudid 1mg), ice packs given and repositioned. POC discussed. Chart check completed. Safety and fall precautions in place. Instructed pt to use call light, verbalized understanding. Call light kept within reach.  No other needs at this time.   Will continue to monitor.

## 2022-08-18 NOTE — PROGRESS NOTES
Hospital Medicine Daily Progress Note    Date of Service  8/18/2022    Chief Complaint  low back pain    Hospital Course  Sampson is a 47-year-old gentleman with complex regional pain syndrome, comes into the hospital complaining of low back pain after he took a trip to [place].  About a year ago he had the same issue at which point he was found to have multiple lumbar vertebrae with disc protrusion and he was recommended to have neurosurgical intervention right away.  He never followed up with them and the pain disappeared at that time.  The pain now is back 10 out of 10 and is rating down the left leg.  The pain is unable to be relieved.  CT of the thoracic spine showed no acute traumatic bone injury.  CT of the lumbar spine also showed no acute traumatic bone injury, with right nephrolithiasis without visualized obstructive changes.  MRI unable to be done due to incompatibility of the spine stimulator wires per [facility].  CT myelogram is being pursued instead.  I discussed the case with Dr. Devries of neurosurgery, who recommended conservative management with pain control, PT/OT, and then outpatient follow-up with him in the office.  He gave the opinion that there is no medical necessity for urgent neurosurgical intervention.  He is started on systemic corticosteroids.  PT/OT were ordered as well.  His symptoms slowly improved, and he felt that the steroids definitely helped.    Interval Problem Update  8/18/2022 - I reviewed the patient's chart. There were no significant overnight events. Remains hemodynamically stable and afebrile. Stable on RA.  CT myelogram only showed mild L4/L5 central stenosis related to disc bulging, and mild L4/L5/S1 foraminal stenosis, however interval decreased volume of intrathecal fluid prevents characterization of the nerve root/intrathecal contents from the mid L5 level caudally.  Pending OT evaluation.    > I have personally seen and examined the patient today.  His pain is  exacerbated today, states that he used a heat pack last night and felt that that caused the worsening of his pain this morning.  Otherwise he has no new neurologic symptoms.  Denies any nausea, vomiting, chest pain, shortness of breath.  No urinary or bladder incontinence or retention.      I have discussed this patient's plan of care and discharge plan at IDT rounds today with Case Management, Nursing, Nursing leadership, and other members of the IDT team.    Consultants/Specialty  None    Code Status  Full Code    Disposition  Patient is not medically cleared for discharge.   Anticipate discharge to to home with close outpatient follow-up. PT/OT while in-house. I have placed the appropriate orders for post-discharge needs.    Review of Systems  ROS     Pertinent positives/negatives as mentioned above.     A complete review of systems was personally done by me. All other systems were negative.       Physical Exam  Temp:  [36.2 °C (97.1 °F)-36.5 °C (97.7 °F)] 36.4 °C (97.5 °F)  Pulse:  [68-74] 68  Resp:  [18] 18  BP: (128-138)/(69-88) 129/88  SpO2:  [93 %-96 %] 96 %    Physical Exam  Vitals and nursing note reviewed. Exam conducted with a chaperone present.   Constitutional:       General: He is not in acute distress.     Appearance: Normal appearance. He is well-developed. He is obese. He is not toxic-appearing or diaphoretic.   HENT:      Head: Normocephalic and atraumatic.      Right Ear: External ear normal.      Left Ear: External ear normal.      Nose: Nose normal.      Mouth/Throat:      Mouth: Mucous membranes are moist.      Pharynx: Oropharynx is clear. No oropharyngeal exudate.   Eyes:      General: No scleral icterus.     Extraocular Movements: Extraocular movements intact.      Conjunctiva/sclera: Conjunctivae normal.      Pupils: Pupils are equal, round, and reactive to light.   Neck:      Thyroid: No thyromegaly.      Vascular: No JVD.   Cardiovascular:      Rate and Rhythm: Normal rate and regular  rhythm.      Pulses: Normal pulses.      Heart sounds: Normal heart sounds. No murmur heard.    No gallop.   Pulmonary:      Effort: Pulmonary effort is normal. No respiratory distress.      Breath sounds: Normal breath sounds. No stridor. No wheezing, rhonchi or rales.   Chest:      Chest wall: No tenderness.   Abdominal:      General: Abdomen is flat. Bowel sounds are normal. There is no distension.      Palpations: Abdomen is soft. There is no mass.      Tenderness: There is no abdominal tenderness. There is no guarding or rebound.   Musculoskeletal:         General: No swelling or tenderness.      Cervical back: Normal range of motion and neck supple.      Right lower leg: No edema.      Left lower leg: No edema.      Comments: Limited ROM LLE due to pain   Lymphadenopathy:      Cervical: No cervical adenopathy.   Skin:     General: Skin is warm.      Capillary Refill: Capillary refill takes less than 2 seconds.      Coloration: Skin is not jaundiced.      Findings: No lesion or rash.   Neurological:      General: No focal deficit present.      Mental Status: He is alert and oriented to person, place, and time.      Cranial Nerves: No cranial nerve deficit.      Deep Tendon Reflexes: Reflexes are normal and symmetric.   Psychiatric:         Mood and Affect: Mood normal.         Behavior: Behavior normal.         Thought Content: Thought content normal.         Judgment: Judgment normal.         Fluids    Intake/Output Summary (Last 24 hours) at 8/18/2022 1018  Last data filed at 8/17/2022 1508  Gross per 24 hour   Intake 240 ml   Output 500 ml   Net -260 ml       Laboratory  Recent Labs     08/16/22 0221   WBC 5.3   RBC 4.61*   HEMOGLOBIN 15.3   HEMATOCRIT 43.3   MCV 93.9   MCH 33.2*   MCHC 35.3   RDW 39.5   PLATELETCT 201   MPV 9.6     Recent Labs     08/16/22  0221   SODIUM 137   POTASSIUM 3.9   CHLORIDE 100   CO2 27   GLUCOSE 90   BUN 14   CREATININE 0.76   CALCIUM 8.8     Recent Labs     08/16/22  3827    INR 1.17*               Imaging  CT-LSPINE WITH PLUS RECONS   Final Result      Mild L4/L5 central stenosis related to disc bulging      Mild L4/5/S1 foraminal stenoses      Interval decreased volume of intrathecal fluid prevents characterization of the nerve roots/intrathecal contents from the mid L5 level caudally.      DX-LUMBAR PUNCTURE FOR CT ONLY   Final Result      Successful fluoroscopic-guided lumbar puncture with intrathecal contrast administration for subsequent CT myelogram.      CT-TSPINE W/O PLUS RECONS   Final Result         1.  No acute traumatic bony injury of the thoracic spine.      CT-LSPINE W/O PLUS RECONS   Final Result         1.  No acute traumatic bony injury of the lumbar spine.   2.  Right nephrolithiasis without visualized obstructive changes      OUTSIDE IMAGES-CT LUMBAR SPINE    (Results Pending)   OUTSIDE IMAGES-CT THORACIC SPINE    (Results Pending)   MR-LUMBAR SPINE-W/O    (Results Pending)        Assessment/Plan  * Intractable back pain with lumbosacral radiculopathy- (present on admission)  Assessment & Plan  -No alarm signs.  No saddle anesthesia, or urinary retention, and radicular signs are unilateral.  Also no high risk mechanism.  -Unfortunately, he is not able to get an MRI due to incompatibility of spine stimulator wires.  Discussed with Dr. Devries of neurosurgery, no medical necessity for urgent neurosurgical intervention, and this can be evaluated for as an outpatient.  CT myelogram only showed mild L4/L5 central stenosis related to disc bulging, and mild L4/L5/S1 foraminal stenosis, however interval decreased volume of intrathecal fluid prevents characterization of the nerve root/intrathecal contents from the mid L5 level caudally.-Continue pain management.  Continue as needed IV Dilaudid, and oral oxycodone.  -Continues to have frequent exacerbation of pain, mostly with movement.  Continue prednisone 60 mg daily for 5 days.  I will add Neurontin 100 mg 3 times daily to  help with neuropathic pain.  I will also start him on OxyContin 30 mg twice daily for more sustained pain control.  Continue as needed Dilaudid and oxycodone for breakthrough pain.  -Continue PT/OT, I encouraged the patient to work extensively with them.          Elevated blood pressure reading- (present on admission)  Assessment & Plan  -Secondary to the pain.  He does not have an established diagnosis of hypertension.  -Continue control pain which should improve blood pressure control.  Monitor blood pressure trend closely.  If elevated blood pressure persists despite adequate pain control, consider starting low-dose antihypertensives.       VTE prophylaxis: SCDs/TEDs

## 2022-08-18 NOTE — THERAPY
Physical Therapy   Daily Treatment     Patient Name: Jarod Fields  Age:  47 y.o., Sex:  male  Medical Record #: [id number]  Today's Date: 8/18/2022     Precautions  Precautions: (P) Spinal / Back Precautions     Assessment    Pt was only provided with education this session regarding spinal precautions, ice vs. Heat, and inflammatory process. Pt encouraged to apply ice 15-20 mins every 30 mins to hour to assist with pain management and dec inflammation. Pt stated traction from last session appeared to worsen symptoms after a few hours after and was not interested in attempting again. Pt encouraged to ambulate with therapist, however, pt states he has [place] [facility] [place] FWW. Pt states he still has not received a walker for home and RN was notified for FWW recs. Pt states he is no needs for therapy at this time and is comfortable d/c home with OP therapy services. Will only monitor pt at this time as pt states he is not interested in therapy at this time.      Plan    Patient will not be actively followed for physical therapy services at this time, however may be seen if requested by physician for 1 more visit within 30 days to address any discharge or equipment needs.    DC Equipment Recommendations: (P) Front-Wheel Walker  Discharge Recommendations: (P) Recommend outpatient physical therapy services to address higher level deficits    Objective       08/18/22 1415   Precautions   Precautions Spinal / Back Precautions    Education Group   Spine Precautions Patient Response Patient;Acceptance;Explanation;Demonstration;Verbal Demonstration;Action Demonstration   Role of Physical Therapist Patient Response Patient;Acceptance;Explanation;Demonstration;Verbal Demonstration;Action Demonstration   Anticipated Discharge Equipment and Recommendations   DC Equipment Recommendations Front-Wheel Walker   Discharge Recommendations Recommend outpatient physical therapy services to address higher level deficits    Interdisciplinary Plan of Care Collaboration   IDT Collaboration with  Nursing   Patient Position at End of Therapy In Bed;Call Light within Reach;Tray Table within Reach;Phone within Reach   Collaboration Comments aware of visit and recs for FWW   Session Information   Date / Session Number  8/18 d/c needs only

## 2022-08-18 NOTE — CARE PLAN
The patient is Stable - Low risk of patient condition declining or worsening    Shift Goals  Clinical Goals: pain control  Patient Goals: pain control  Family Goals:  (MAHSA)    Progress made toward(s) clinical / shift goals:  Progressing  Pts pain has been difficult to control. Pts pain was relieved for a few hours after getting an extra dose of dilaudid. Pt to go home tomorrow if pain is controlled.     Patient is not progressing towards the following goals:

## 2022-08-18 NOTE — PROGRESS NOTES
Received report from Cheryl VALLE and Sebastian RN. Pt AOx4 and reports 10/10 pain. Pt offered Prn dilaudid, Pt accepted, dilaudid PRN given. Pt updated on Poc for the day. Pt has no further questions or needs at this time.

## 2022-08-18 NOTE — DISCHARGE PLANNING
Case Management Discharge Planning    Admission Date: 8/14/2022  GMLOS: 2.9  ALOS: 2    6-Clicks ADL Score: 18  6-Clicks Mobility Score: 19      Anticipated Discharge Dispo: Discharge Disposition: Discharged to home/self care (01)    DME Needed: No    Action(s) Taken: Updated Provider/Nurse on Discharge Plan    No anticipated DC needs at this time. Full OT eval pending.     Escalations Completed: None    Medically Clear: No    Next Steps: Medical clearance    Barriers to Discharge: Medical clearance

## 2022-08-18 NOTE — THERAPY
Occupational Therapy  Contact Note    Patient Name: Jarod Fields  Age:  47 y.o., Sex:  male  Medical Record #: [id number]  Today's Date: 8/18/2022      OT eval order received and acknowledged, chart reviewed. Attempted full OT evaluation today, met with pt, who denies need for acute OT services. Per pt, he has been dealing with his back pains for the past year, and has a follow up with his neuro MD on Monday. Pt also states he is well-versed with all compensatory strategies and techniques to maintain neutral spine, and has been Mod I with ADLs here and at home when his back pain is flaring up. Pt requests OT eval be canceled at this time. Mom is a retired RN and dad is a PA -- both living locally as well.     Barbara Cody, OT  [id number]

## 2022-08-18 NOTE — CARE PLAN
The patient is Stable - Low risk of patient condition declining or worsening    Shift Goals  Clinical Goals: Pain control and wean off Dilaudid  Patient Goals: Pain management  Family Goals:  (MAHSA)    Progress made toward(s) clinical / shift goals:    Problem: Knowledge Deficit - Standard  Goal: Patient and family/care givers will demonstrate understanding of plan of care, disease process/condition, diagnostic tests and medications  Outcome: Progressing: pt verbalized understanding of POC       Patient is not progressing towards the following goals:      Problem: Pain - Standard  Goal: Alleviation of pain or a reduction in pain to the patient’s comfort goal  Outcome: Not Progressing: pt pain unable to be controlled under 9-10/10

## 2022-08-19 PROCEDURE — 700102 HCHG RX REV CODE 250 W/ 637 OVERRIDE(OP): Performed by: HOSPITALIST

## 2022-08-19 PROCEDURE — 700102 HCHG RX REV CODE 250 W/ 637 OVERRIDE(OP): Performed by: INTERNAL MEDICINE

## 2022-08-19 PROCEDURE — 94760 N-INVAS EAR/PLS OXIMETRY 1: CPT

## 2022-08-19 PROCEDURE — A9270 NON-COVERED ITEM OR SERVICE: HCPCS | Performed by: INTERNAL MEDICINE

## 2022-08-19 PROCEDURE — 700111 HCHG RX REV CODE 636 W/ 250 OVERRIDE (IP): Performed by: HOSPITALIST

## 2022-08-19 PROCEDURE — 700111 HCHG RX REV CODE 636 W/ 250 OVERRIDE (IP): Performed by: INTERNAL MEDICINE

## 2022-08-19 PROCEDURE — A9270 NON-COVERED ITEM OR SERVICE: HCPCS | Performed by: HOSPITALIST

## 2022-08-19 PROCEDURE — 770006 HCHG ROOM/CARE - MED/SURG/GYN SEMI*

## 2022-08-19 PROCEDURE — 99232 SBSQ HOSP IP/OBS MODERATE 35: CPT | Performed by: INTERNAL MEDICINE

## 2022-08-19 RX ORDER — OXYCODONE HYDROCHLORIDE 10 MG/1
10 TABLET ORAL
Status: DISCONTINUED | OUTPATIENT
Start: 2022-08-19 | End: 2022-08-20 | Stop reason: HOSPADM

## 2022-08-19 RX ORDER — KETOROLAC TROMETHAMINE 30 MG/ML
15 INJECTION, SOLUTION INTRAMUSCULAR; INTRAVENOUS ONCE
Status: COMPLETED | OUTPATIENT
Start: 2022-08-19 | End: 2022-08-19

## 2022-08-19 RX ORDER — HYDROMORPHONE HYDROCHLORIDE 1 MG/ML
1 INJECTION, SOLUTION INTRAMUSCULAR; INTRAVENOUS; SUBCUTANEOUS ONCE
Status: COMPLETED | OUTPATIENT
Start: 2022-08-19 | End: 2022-08-19

## 2022-08-19 RX ORDER — GABAPENTIN 100 MG/1
200 CAPSULE ORAL 3 TIMES DAILY
Status: DISCONTINUED | OUTPATIENT
Start: 2022-08-19 | End: 2022-08-20

## 2022-08-19 RX ADMIN — OXYCODONE HYDROCHLORIDE 10 MG: 10 TABLET ORAL at 22:46

## 2022-08-19 RX ADMIN — OXYCODONE HYDROCHLORIDE 10 MG: 10 TABLET ORAL at 15:02

## 2022-08-19 RX ADMIN — HYDROMORPHONE HYDROCHLORIDE 1 MG: 1 INJECTION, SOLUTION INTRAMUSCULAR; INTRAVENOUS; SUBCUTANEOUS at 07:52

## 2022-08-19 RX ADMIN — HYDROMORPHONE HYDROCHLORIDE 1 MG: 1 INJECTION, SOLUTION INTRAMUSCULAR; INTRAVENOUS; SUBCUTANEOUS at 16:03

## 2022-08-19 RX ADMIN — KETOROLAC TROMETHAMINE 15 MG: 30 INJECTION, SOLUTION INTRAMUSCULAR; INTRAVENOUS at 20:03

## 2022-08-19 RX ADMIN — OXYCODONE HYDROCHLORIDE 30 MG: 20 TABLET, FILM COATED, EXTENDED RELEASE ORAL at 06:05

## 2022-08-19 RX ADMIN — HYDROMORPHONE HYDROCHLORIDE 1 MG: 1 INJECTION, SOLUTION INTRAMUSCULAR; INTRAVENOUS; SUBCUTANEOUS at 19:19

## 2022-08-19 RX ADMIN — GABAPENTIN 100 MG: 100 CAPSULE ORAL at 06:05

## 2022-08-19 RX ADMIN — PREDNISONE 60 MG: 50 TABLET ORAL at 06:05

## 2022-08-19 RX ADMIN — HYDROMORPHONE HYDROCHLORIDE 1 MG: 1 INJECTION, SOLUTION INTRAMUSCULAR; INTRAVENOUS; SUBCUTANEOUS at 02:55

## 2022-08-19 RX ADMIN — OXYCODONE HYDROCHLORIDE 10 MG: 10 TABLET ORAL at 04:03

## 2022-08-19 RX ADMIN — OXYCODONE HYDROCHLORIDE 10 MG: 10 TABLET ORAL at 11:31

## 2022-08-19 RX ADMIN — HYDROMORPHONE HYDROCHLORIDE 1 MG: 1 INJECTION, SOLUTION INTRAMUSCULAR; INTRAVENOUS; SUBCUTANEOUS at 09:13

## 2022-08-19 RX ADMIN — OXYCODONE HYDROCHLORIDE 10 MG: 10 TABLET ORAL at 01:03

## 2022-08-19 RX ADMIN — OXYCODONE HYDROCHLORIDE 30 MG: 20 TABLET, FILM COATED, EXTENDED RELEASE ORAL at 17:43

## 2022-08-19 RX ADMIN — GABAPENTIN 200 MG: 100 CAPSULE ORAL at 11:31

## 2022-08-19 RX ADMIN — GABAPENTIN 200 MG: 100 CAPSULE ORAL at 17:43

## 2022-08-19 ASSESSMENT — PAIN DESCRIPTION - PAIN TYPE
TYPE: ACUTE PAIN

## 2022-08-19 ASSESSMENT — PAIN SCALES - PAIN ASSESSMENT IN ADVANCED DEMENTIA (PAINAD)
BODYLANGUAGE: RELAXED
BODYLANGUAGE: RELAXED

## 2022-08-19 ASSESSMENT — PATIENT HEALTH QUESTIONNAIRE - PHQ9
2. FEELING DOWN, DEPRESSED, IRRITABLE, OR HOPELESS: NOT AT ALL
1. LITTLE INTEREST OR PLEASURE IN DOING THINGS: NOT AT ALL
SUM OF ALL RESPONSES TO PHQ9 QUESTIONS 1 AND 2: 0

## 2022-08-19 NOTE — CARE PLAN
The patient is Stable - Low risk of patient condition declining or worsening    Shift Goals  Clinical Goals: Pt pain will be less than a 5/10 by end of shift  Patient Goals: Patient will be able to report a decrease oin pain after pain medication administration  Family Goals:  (MAHSA)    Progress made toward(s) clinical / shift goals:  Patient able to report some relief from pain this shift after medication administration.     Patient is not progressing towards the following goals: Continues to need IV pain medication for pain control.

## 2022-08-19 NOTE — CARE PLAN
Problem: Pain - Standard  Goal: Alleviation of pain or a reduction in pain to the patient’s comfort goal  Outcome: Progressing     Problem: Knowledge Deficit - Standard  Goal: Patient and family/care givers will demonstrate understanding of plan of care, disease process/condition, diagnostic tests and medications  Outcome: Progressing   The patient is Stable - Low risk of patient condition declining or worsening    Shift Goals  Clinical Goals: Pt pain will be less than a 5/10 by end of shift  Patient Goals: rest comfortably  Family Goals:  (MAHSA)    Progress made toward(s) clinical / shift goals:  all goals     Patient is not progressing towards the following goals:

## 2022-08-19 NOTE — PROGRESS NOTES
Hospital Medicine Daily Progress Note    Date of Service  8/19/2022    Chief Complaint  low back pain    Hospital Course  Sampson is a 47-year-old gentleman with complex regional pain syndrome, comes into the hospital complaining of low back pain after he took a trip to [place].  About a year ago he had the same issue at which point he was found to have multiple lumbar vertebrae with disc protrusion and he was recommended to have neurosurgical intervention right away.  He never followed up with them and the pain disappeared at that time.  The pain now is back 10 out of 10 and is rating down the left leg.  The pain is unable to be relieved.  CT of the thoracic spine showed no acute traumatic bone injury.  CT of the lumbar spine also showed no acute traumatic bone injury, with right nephrolithiasis without visualized obstructive changes.  MRI unable to be done due to incompatibility of the spine stimulator wires per [employer].  CT myelogram was pursued instead and only showed mild L4/L5 central stenosis related to disc bulging, and mild L4/L5/S1 foraminal stenosis, however interval decreased volume of intrathecal fluid prevents characterization of the nerve root/intrathecal contents from the mid L5 level caudally. .  I discussed the case with Dr. Devries of neurosurgery, who recommended conservative management with pain control, PT/OT, and then outpatient follow-up with him in the office.  He gave the opinion that there is no medical necessity for urgent neurosurgical intervention.  He is started on systemic corticosteroids, neurontin and oxycontin.  PT/OT were ordered as well.  His symptoms slowly improved, and he felt that the steroids definitely helped.    Interval Problem Update  8/19/2022 - I reviewed the patient's chart today. Uneventful night. VSS. Afebrile. Saturating well on RA.      > I have personally seen and examined the patient today.  He is still in pain, worse with movement.  He states the oral  OxyContin and Neurontin helped, but pain subsequently returns.  Denies any nausea, vomiting, abdominal pain, bowel movement changes.      I have discussed this patient's plan of care and discharge plan at IDT rounds today with Case Management, Nursing, Nursing leadership, and other members of the IDT team.    Consultants/Specialty  None    Code Status  Full Code    Disposition  Patient is not medically cleared for discharge.   Anticipate discharge to to home with close outpatient follow-up. PT/OT while in-house. I have placed the appropriate orders for post-discharge needs.    Review of Systems  ROS     Pertinent positives/negatives as mentioned above.     A complete review of systems was personally done by me. All other systems were negative.       Physical Exam  Temp:  [36.4 °C (97.6 °F)-37.3 °C (99.2 °F)] 36.5 °C (97.7 °F)  Pulse:  [60-91] 64  Resp:  [17-18] 18  BP: (127-152)/(79-91) 152/91  SpO2:  [91 %-94 %] 91 %    Physical Exam  Vitals and nursing note reviewed. Exam conducted with a chaperone present.   Constitutional:       General: He is not in acute distress.     Appearance: Normal appearance. He is well-developed. He is obese. He is not toxic-appearing or diaphoretic.   HENT:      Head: Normocephalic and atraumatic.      Right Ear: External ear normal.      Left Ear: External ear normal.      Nose: Nose normal.      Mouth/Throat:      Mouth: Mucous membranes are moist.      Pharynx: Oropharynx is clear. No oropharyngeal exudate.   Eyes:      General: No scleral icterus.     Extraocular Movements: Extraocular movements intact.      Conjunctiva/sclera: Conjunctivae normal.      Pupils: Pupils are equal, round, and reactive to light.   Neck:      Thyroid: No thyromegaly.      Vascular: No JVD.   Cardiovascular:      Rate and Rhythm: Normal rate and regular rhythm.      Pulses: Normal pulses.      Heart sounds: Normal heart sounds. No murmur heard.    No gallop.   Pulmonary:      Effort: Pulmonary effort is  normal. No respiratory distress.      Breath sounds: Normal breath sounds. No stridor. No wheezing, rhonchi or rales.   Chest:      Chest wall: No tenderness.   Abdominal:      General: Abdomen is flat. Bowel sounds are normal. There is no distension.      Palpations: Abdomen is soft. There is no mass.      Tenderness: There is no abdominal tenderness. There is no guarding or rebound.   Musculoskeletal:         General: No swelling or tenderness.      Cervical back: Normal range of motion and neck supple.      Right lower leg: No edema.      Left lower leg: No edema.      Comments: Limited ROM LLE due to pain   Lymphadenopathy:      Cervical: No cervical adenopathy.   Skin:     General: Skin is warm.      Capillary Refill: Capillary refill takes less than 2 seconds.      Coloration: Skin is not jaundiced.      Findings: No lesion or rash.   Neurological:      General: No focal deficit present.      Mental Status: He is alert and oriented to person, place, and time.      Cranial Nerves: No cranial nerve deficit.      Deep Tendon Reflexes: Reflexes are normal and symmetric.   Psychiatric:         Mood and Affect: Mood normal.         Behavior: Behavior normal.         Thought Content: Thought content normal.         Judgment: Judgment normal.     I have performed the physical examination today 8/19/2022.  In review of yesterday's note, there are no new changes except as documented above.        Fluids    Intake/Output Summary (Last 24 hours) at 8/19/2022 0839  Last data filed at 8/19/2022 0800  Gross per 24 hour   Intake 460 ml   Output 250 ml   Net 210 ml       Laboratory              Recent Labs     08/16/22  1627   INR 1.17*               Imaging  CT-LSPINE WITH PLUS RECONS   Final Result      Mild L4/L5 central stenosis related to disc bulging      Mild L4/5/S1 foraminal stenoses      Interval decreased volume of intrathecal fluid prevents characterization of the nerve roots/intrathecal contents from the mid L5  level caudally.      DX-LUMBAR PUNCTURE FOR CT ONLY   Final Result      Successful fluoroscopic-guided lumbar puncture with intrathecal contrast administration for subsequent CT myelogram.      CT-TSPINE W/O PLUS RECONS   Final Result         1.  No acute traumatic bony injury of the thoracic spine.      CT-LSPINE W/O PLUS RECONS   Final Result         1.  No acute traumatic bony injury of the lumbar spine.   2.  Right nephrolithiasis without visualized obstructive changes      OUTSIDE IMAGES-CT LUMBAR SPINE    (Results Pending)   OUTSIDE IMAGES-CT THORACIC SPINE    (Results Pending)   MR-LUMBAR SPINE-W/O    (Results Pending)        Assessment/Plan  * Intractable back pain with lumbosacral radiculopathy- (present on admission)  Assessment & Plan  -No alarm signs.  No saddle anesthesia, or urinary retention, and radicular signs are unilateral.  Also no high risk mechanism.  -Unfortunately, he is not able to get an MRI due to incompatibility of spine stimulator wires.  Discussed with Dr. Devries of neurosurgery, no medical necessity for urgent neurosurgical intervention, and this can be evaluated for as an outpatient.  CT myelogram only showed mild L4/L5 central stenosis related to disc bulging, and mild L4/L5/S1 foraminal stenosis, however interval decreased volume of intrathecal fluid prevents characterization of the nerve root/intrathecal contents from the mid L5 level caudally.-Continue pain management.  Continue as needed IV Dilaudid, and oral oxycodone.  -Continues to have frequent exacerbation of pain, mostly with movement.  Continue prednisone 60 mg daily.  Continue Neurontin, increase to 200 mg 3 times daily to help with neuropathic pain.  Continue OxyContin 30 mg twice daily for more sustained pain control.  Continue as needed Dilaudid and oxycodone for breakthrough pain.  -Continue PT/OT, I encouraged the patient to work extensively with them.          Elevated blood pressure reading- (present on  admission)  Assessment & Plan  -Secondary to the pain.  He does not have an established diagnosis of hypertension.  -Continue control pain which should improve blood pressure control.  Monitor blood pressure trend closely.  If elevated blood pressure persists despite adequate pain control, consider starting low-dose antihypertensives.       VTE prophylaxis: SCDs/TEDs

## 2022-08-19 NOTE — PROGRESS NOTES
Received report from night shift RN. Patient reporting pain, medicated per MAR. POC discussed. Safety precautions in place. Call light, belongings in reach.

## 2022-08-20 VITALS
RESPIRATION RATE: 20 BRPM | SYSTOLIC BLOOD PRESSURE: 136 MMHG | HEART RATE: 78 BPM | HEIGHT: 71 IN | BODY MASS INDEX: 30.8 KG/M2 | WEIGHT: 220 LBS | OXYGEN SATURATION: 93 % | DIASTOLIC BLOOD PRESSURE: 85 MMHG | TEMPERATURE: 98 F

## 2022-08-20 PROCEDURE — 700111 HCHG RX REV CODE 636 W/ 250 OVERRIDE (IP): Performed by: INTERNAL MEDICINE

## 2022-08-20 PROCEDURE — 700102 HCHG RX REV CODE 250 W/ 637 OVERRIDE(OP): Performed by: INTERNAL MEDICINE

## 2022-08-20 PROCEDURE — 94760 N-INVAS EAR/PLS OXIMETRY 1: CPT

## 2022-08-20 PROCEDURE — A9270 NON-COVERED ITEM OR SERVICE: HCPCS | Performed by: INTERNAL MEDICINE

## 2022-08-20 PROCEDURE — 99239 HOSP IP/OBS DSCHRG MGMT >30: CPT | Performed by: INTERNAL MEDICINE

## 2022-08-20 RX ORDER — PREDNISONE 50 MG/1
50 TABLET ORAL DAILY
Qty: 5 TABLET | Refills: 0 | Status: SHIPPED | OUTPATIENT
Start: 2022-08-20 | End: 2022-08-25

## 2022-08-20 RX ORDER — METHOCARBAMOL 750 MG/1
1500 TABLET, FILM COATED ORAL 3 TIMES DAILY PRN
Qty: 30 TABLET | Refills: 0 | Status: SHIPPED
Start: 2022-08-20 | End: 2022-12-14

## 2022-08-20 RX ORDER — OXYCODONE HCL 40 MG/1
40 TABLET, FILM COATED, EXTENDED RELEASE ORAL EVERY 12 HOURS
Qty: 10 TABLET | Refills: 0 | Status: SHIPPED | OUTPATIENT
Start: 2022-08-20 | End: 2022-08-25

## 2022-08-20 RX ORDER — OXYCODONE HYDROCHLORIDE 10 MG/1
10 TABLET ORAL EVERY 6 HOURS PRN
Qty: 20 TABLET | Refills: 0 | Status: SHIPPED | OUTPATIENT
Start: 2022-08-20 | End: 2022-08-25

## 2022-08-20 RX ORDER — OXYCODONE HYDROCHLORIDE 10 MG/1
10 TABLET ORAL EVERY 6 HOURS PRN
Qty: 20 TABLET | Refills: 0 | Status: SHIPPED | OUTPATIENT
Start: 2022-08-20 | End: 2022-08-20 | Stop reason: SDUPTHER

## 2022-08-20 RX ORDER — GABAPENTIN 300 MG/1
300 CAPSULE ORAL 3 TIMES DAILY
Status: DISCONTINUED | OUTPATIENT
Start: 2022-08-20 | End: 2022-08-20 | Stop reason: HOSPADM

## 2022-08-20 RX ORDER — OXYCODONE HCL 40 MG/1
40 TABLET, FILM COATED, EXTENDED RELEASE ORAL EVERY 12 HOURS
Status: DISCONTINUED | OUTPATIENT
Start: 2022-08-20 | End: 2022-08-20 | Stop reason: HOSPADM

## 2022-08-20 RX ORDER — OXYCODONE HCL 40 MG/1
40 TABLET, FILM COATED, EXTENDED RELEASE ORAL EVERY 12 HOURS
Qty: 10 TABLET | Refills: 0 | Status: SHIPPED | OUTPATIENT
Start: 2022-08-20 | End: 2022-08-20 | Stop reason: SDUPTHER

## 2022-08-20 RX ORDER — GABAPENTIN 300 MG/1
300 CAPSULE ORAL 3 TIMES DAILY
Qty: 60 CAPSULE | Refills: 0 | Status: SHIPPED | OUTPATIENT
Start: 2022-08-20 | End: 2022-09-09

## 2022-08-20 RX ORDER — PREDNISONE 50 MG/1
50 TABLET ORAL DAILY
Qty: 5 TABLET | Refills: 0 | Status: SHIPPED | OUTPATIENT
Start: 2022-08-20 | End: 2022-08-20 | Stop reason: SDUPTHER

## 2022-08-20 RX ORDER — GABAPENTIN 300 MG/1
300 CAPSULE ORAL 3 TIMES DAILY
Qty: 60 CAPSULE | Refills: 0 | Status: SHIPPED | OUTPATIENT
Start: 2022-08-20 | End: 2022-08-20 | Stop reason: SDUPTHER

## 2022-08-20 RX ORDER — KETOROLAC TROMETHAMINE 30 MG/ML
15 INJECTION, SOLUTION INTRAMUSCULAR; INTRAVENOUS EVERY 6 HOURS PRN
Status: DISCONTINUED | OUTPATIENT
Start: 2022-08-20 | End: 2022-08-20 | Stop reason: HOSPADM

## 2022-08-20 RX ORDER — PREDNISONE 1 MG/1
50 TABLET ORAL DAILY
Qty: 250 TABLET | Refills: 0 | Status: SHIPPED | OUTPATIENT
Start: 2022-08-20 | End: 2022-08-20 | Stop reason: SDUPTHER

## 2022-08-20 RX ADMIN — GABAPENTIN 200 MG: 100 CAPSULE ORAL at 05:02

## 2022-08-20 RX ADMIN — PREDNISONE 60 MG: 50 TABLET ORAL at 05:02

## 2022-08-20 RX ADMIN — OXYCODONE HYDROCHLORIDE 30 MG: 20 TABLET, FILM COATED, EXTENDED RELEASE ORAL at 05:03

## 2022-08-20 RX ADMIN — OXYCODONE HYDROCHLORIDE 10 MG: 10 TABLET ORAL at 08:26

## 2022-08-20 RX ADMIN — OXYCODONE HYDROCHLORIDE 10 MG: 10 TABLET ORAL at 13:00

## 2022-08-20 RX ADMIN — GABAPENTIN 300 MG: 300 CAPSULE ORAL at 11:29

## 2022-08-20 RX ADMIN — HYDROMORPHONE HYDROCHLORIDE 1 MG: 1 INJECTION, SOLUTION INTRAMUSCULAR; INTRAVENOUS; SUBCUTANEOUS at 11:29

## 2022-08-20 NOTE — DISCHARGE PLANNING
Case Management Discharge Planning    Admission Date: 8/14/2022  GMLOS: 2.9  ALOS: 4    6-Clicks ADL Score: 18  6-Clicks Mobility Score: 19      Anticipated Discharge Dispo: Discharge Disposition: Discharged to home/self care (01)    DME Needed: Yes    DME Ordered: Yes    Action(s) Taken: Choice form completed for walker from [facility] continuity of care and faxed to [facility]. Bedside RN Josee to deliver walker to pt's bedside.    Escalations Completed: None    Medically Clear: Yes    Next Steps: DC today with FWW.    Barriers to Discharge: None    Is the patient up for discharge tomorrow: No, today

## 2022-08-20 NOTE — PROGRESS NOTES
Patient is being discharged home. Patient is A&Ox4. IV removed. FWW provided to patient via [employer]. Discharge instructions provided to patient and reviewed. Patient verbalized understanding and all questions and concerns were addressed. All belongings were packed and taken with. Patient escorted off unit by staff.

## 2022-08-20 NOTE — CARE PLAN
The patient is Stable - Low risk of patient condition declining or worsening    Shift Goals  Clinical Goals: Patient will not require IV pain medication this shift  Patient Goals: Patient will have an increase in pain relief  Family Goals: n/a    Progress made toward(s) clinical / shift goals:  Patient is medically cleared for discharge home in self care with assistance from family. Discussed with patient new medications prescribed at details and patient verbalized understanding.     Patient is not progressing towards the following goals: Patient required both PO and IV pain medication for pain relief this shift. Discussed with the patient the importance of following up with PCP for appropriate referrals in next steps of plan of care. Patient verbalized understanding.

## 2022-08-20 NOTE — DISCHARGE SUMMARY
Discharge Summary    CHIEF COMPLAINT ON ADMISSION  Chief Complaint   Patient presents with    Back Pain     Pt c/o lower back Pn, Pt stated that they performed an MRI earlier this year - L3-5 is bulging, compressed and lacerated, hasnt gotten it treated yet but was seen by Dr. Devries at [facility] for concerns; Pt stated that he did fall today because of his back Pn - denies hitting his head and/or LOC; Pt denies taking any medication to treat;        Reason for Admission  Back Pain     Admission Date  8/14/2022    CODE STATUS  Full Code    HPI & HOSPITAL COURSE  Sampson is a 47-year-old gentleman with complex regional pain syndrome, comes into the hospital complaining of low back pain after he took a trip to [place].  About a year ago he had the same issue at which point he was found to have multiple lumbar vertebrae with disc protrusion and he was recommended to have neurosurgical intervention right away.  He never followed up with them and the pain disappeared at that time.  The pain now came back 10 out of 10 and was radiating down the left leg.  The pain was unable to be relieved.  CT of the thoracic spine showed no acute traumatic bone injury.  CT of the lumbar spine also showed no acute traumatic bone injury, with right nephrolithiasis without visualized obstructive changes.  MRI was unable to be done due to incompatibility of the spine stimulator wires per [employer].  CT myelogram was pursued instead and only showed mild L4/L5 central stenosis related to disc bulging, and mild L4/L5/S1 foraminal stenosis, however interval decreased volume of intrathecal fluid prevents characterization of the nerve root/intrathecal contents from the mid L5 level caudally.  I discussed the case with Dr. Devries of neurosurgery, who recommended conservative management with pain control, PT/OT, and then outpatient follow-up with him in the office.  He gave the opinion that there is no medical necessity for urgent  neurosurgical intervention.  He is started on systemic corticosteroids, neurontin and oxycontin, along with as needed analgesics.  PT/OT were ordered as well.  His symptoms slowly improved, to the point that he was able to tolerate the pain and do activities of daily living.  He has arranged follow-up for himself with the neurosurgery office.    He remained hemodynamically stable and afebrile. I have personally seen and examined the patient on the day of discharge. With his clinical improvement, he was deemed ready to discharge from the hospital as he did not have any further hospitalization needs. Patient felt comfortable going home. The discharge plan was discussed with the patient, with which he was agreeable to.       Therefore, he is discharged in fair and stable condition to home with close outpatient follow-up.    The patient met 2-midnight criteria for an inpatient stay at the time of discharge.    Discharge Date  8/20/2022      FOLLOW UP ITEMS POST DISCHARGE  -He will continue on Neurontin, OxyContin, Robaxin, along with as needed oxycodone.    -He will follow up with outpatient neurosurgery on Monday for further definitive management.  - counseled to seek immediate medical attention, or return to the ED for recurrent or worsening symptoms.      DISCHARGE DIAGNOSES  Principal Problem:    Intractable back pain with lumbosacral radiculopathy POA: Yes  Active Problems:    Elevated blood pressure reading POA: Yes    Intractable low back pain POA: Yes  Resolved Problems:    * No resolved hospital problems. *      FOLLOW UP  Future Appointments   Date Time Provider Department Center   9/13/2022  8:20 AM JOSE JUAN Re[place]     No follow-up provider specified.    MEDICATIONS ON DISCHARGE     Medication List        START taking these medications        Instructions   gabapentin 300 MG Caps  Commonly known as: NEURONTIN   Take 1 Capsule by mouth 3 times a day for 20 days.  Dose: 300 mg      methocarbamol 750 MG Tabs  Commonly known as: ROBAXIN   Take 2 Tablets by mouth 3 times a day as needed (MUSCLE SPASM).  Dose: 1,500 mg     * oxyCODONE immediate release 10 MG immediate release tablet  Commonly known as: ROXICODONE   Take 1 Tablet by mouth every 6 hours as needed for Severe Pain for up to 5 days.  Dose: 10 mg     * oxyCODONE CR 40 MG T12a tablet  Commonly known as: OXYCONTIN   Take 1 Tablet by mouth every 12 hours for 5 days.  Dose: 40 mg     predniSONE 1 MG Tabs  Commonly known as: DELTASONE   Take 50 Tablets by mouth every day for 5 days.  Dose: 50 mg           * This list has 2 medication(s) that are the same as other medications prescribed for you. Read the directions carefully, and ask your doctor or other care provider to review them with you.                CHANGE how you take these medications        Instructions   Medi-Patch-Lidocaine 0.5-0.035-5-20 % Ptch  What changed:   medication strength  reasons to take this  Generic drug: Lido-Capsaicin-Men-Methyl Sal   Apply 1 Patch topically as needed (low back pain).  Dose: 1 Patch            CONTINUE taking these medications        Instructions   ibuprofen 600 MG Tabs  Commonly known as: MOTRIN   Take 600 mg by mouth every 6 hours as needed.  Dose: 600 mg            STOP taking these medications      methylPREDNISolone 4 MG Tbpk  Commonly known as: MEDROL DOSEPAK              Allergies  Allergies   Allergen Reactions    Morphine Swelling     Redness and itching    Morphine Sulfate [Morphine]      unknown       DIET  Orders Placed This Encounter   Procedures    Diet Order Diet: Cardiac     Standing Status:   Standing     Number of Occurrences:   1     Order Specific Question:   Diet:     Answer:   Cardiac [6]       ACTIVITY  As tolerated.  Weight bearing as tolerated    CONSULTATIONS  None    PROCEDURES  As above    LABORATORY  Lab Results   Component Value Date    SODIUM 137 08/16/2022    POTASSIUM 3.9 08/16/2022    CHLORIDE 100 08/16/2022     CO2 27 08/16/2022    GLUCOSE 90 08/16/2022    BUN 14 08/16/2022    CREATININE 0.76 08/16/2022        Lab Results   Component Value Date    WBC 5.3 08/16/2022    HEMOGLOBIN 15.3 08/16/2022    HEMATOCRIT 43.3 08/16/2022    PLATELETCT 201 08/16/2022        Total time of the discharge process = 40 minutes.

## 2022-08-20 NOTE — PROGRESS NOTES
Notified Dr. Rajan I'd given Dilaudid at 1919 and after 15-20 patient was still in excruciating pain.  Ordered Toradol 15 mg IV once.  Given Toradol in a matter of minutes patient has stated relief.      Ordered also to wait 3 hours before given another dose of narcotics.

## 2022-08-20 NOTE — CARE PLAN
The patient is Stable - Low risk of patient condition declining or worsening    Shift Goals  Clinical Goals: Pain management  Patient Goals: Pain management  Family Goals: Comfort    Progress made toward(s) clinical / shift goals:  Patient's pain has been managed, resting comfortably in bed.      Patient is not progressing towards the following goals:  N/A.

## 2022-08-20 NOTE — DISCHARGE PLANNING
Received Choice form at [phone]  Agency/Facility Name: [facility]   Referral sent per Choice form @ [id number]

## 2022-12-14 ENCOUNTER — OFFICE VISIT (OUTPATIENT)
Dept: MEDICAL GROUP | Facility: PHYSICIAN GROUP | Age: 47
End: 2022-12-14
Payer: COMMERCIAL

## 2022-12-14 ENCOUNTER — TELEPHONE (OUTPATIENT)
Dept: SCHEDULING | Facility: IMAGING CENTER | Age: 47
End: 2022-12-14

## 2022-12-14 VITALS
HEART RATE: 86 BPM | DIASTOLIC BLOOD PRESSURE: 78 MMHG | SYSTOLIC BLOOD PRESSURE: 130 MMHG | TEMPERATURE: 97.2 F | WEIGHT: 222 LBS | RESPIRATION RATE: 20 BRPM | HEIGHT: 71 IN | BODY MASS INDEX: 31.08 KG/M2 | OXYGEN SATURATION: 98 %

## 2022-12-14 DIAGNOSIS — E78.00 ELEVATED LDL CHOLESTEROL LEVEL: ICD-10-CM

## 2022-12-14 DIAGNOSIS — Z13.1 SCREENING FOR DIABETES MELLITUS: ICD-10-CM

## 2022-12-14 DIAGNOSIS — M79.602 PAIN OF LEFT UPPER EXTREMITY: ICD-10-CM

## 2022-12-14 DIAGNOSIS — R06.02 SHORTNESS OF BREATH: ICD-10-CM

## 2022-12-14 DIAGNOSIS — M79.605 PAIN OF LEFT LOWER EXTREMITY: ICD-10-CM

## 2022-12-14 DIAGNOSIS — R53.82 CHRONIC FATIGUE: ICD-10-CM

## 2022-12-14 PROCEDURE — 99214 OFFICE O/P EST MOD 30 MIN: CPT | Performed by: NURSE PRACTITIONER

## 2022-12-14 PROCEDURE — 93000 ELECTROCARDIOGRAM COMPLETE: CPT | Performed by: NURSE PRACTITIONER

## 2022-12-14 RX ORDER — METHOCARBAMOL 750 MG/1
750 TABLET, FILM COATED ORAL 3 TIMES DAILY PRN
Qty: 120 TABLET | Refills: 0 | Status: ON HOLD | OUTPATIENT
Start: 2022-12-14 | End: 2023-06-30

## 2022-12-14 RX ORDER — OXYCODONE HYDROCHLORIDE 30 MG/1
TABLET, FILM COATED, EXTENDED RELEASE ORAL
COMMUNITY
Start: 2022-09-27 | End: 2022-12-14

## 2022-12-14 RX ORDER — LIDOCAINE 50 MG/G
1 PATCH TOPICAL EVERY 24 HOURS
COMMUNITY
Start: 2022-09-26 | End: 2023-09-08

## 2022-12-14 ASSESSMENT — FIBROSIS 4 INDEX: FIB4 SCORE: 0.78

## 2022-12-15 NOTE — PROGRESS NOTES
Subjective  Chief Complaint  Establish Care    History of Present Illness  Jarod Fields is a 47 y.o. male. This patient is here today to establish care.    Pain of left upper extremity  Acute and ongoing. He states that for the past month he has noticed a squeezing type pain to his left upper arm. He states that it will come and go. He notices that the pain sometimes feels worse when he lays down. He has tried a lidocaine patch but did not feel much improvement.    Shortness of breath  Acute and ongoing. He states that for the past month every time he lays down, mostly at night he has some shortness of breath. He as been jolted awake from the shortness of breath at night. He said a few times he did have some chest pain with it but not all the time.    Past Medical History    Allergies: Morphine and Morphine sulfate [morphine]  Past Medical History:   Diagnosis Date    CRPS (complex regional pain syndrome type I) 2013    Pain 2/18/15    right arm     Past Surgical History:   Procedure Laterality Date    SPINAL CORD STIMULATOR  2/19/2015    Performed by Ghassan Coy M.D. at SURGERY [facility]    TN PERCUT IMPLNT NEUROELECT,EPIDURAL  2/4/2015    Performed by Ghassan Coy M.D. at [facility]    TN PERCUT IMPLNT NEUROELECT,EPIDURAL  2/4/2015    Performed by Ghassan Coy M.D. at [facility]    PB INJECT NERV BLCK,STELLATE GANGLION  12/10/2014    Performed by Ghassan Coy M.D. at [facility] ORS    PB INJECT NERV BLCK,STELLATE GANGLION  12/3/2014    Performed by Ghassan Coy M.D. at [facility] ORS    PB INJECT NERV BLCK,STELLATE GANGLION  11/26/2014    Performed by Ghassan Coy M.D. at [facility]    GASTROSCOPY WITH BIOPSY  6/22/2013    Performed by Robbie Nesbitt M.D. at ENDOSCOPY [facility] ORS    OTHER ORTHOPEDIC SURGERY      L tib/fib w/hardware-removed     Current Outpatient Medications Ordered in Epic   Medication Sig  "Dispense Refill    lidocaine (LIDODERM) 5 % Patch APPLY 1 PATCH BY TRANSDERMAL ROUTE ONCE DAILY (MAY WEAR UP TO 12HOURS.)      methocarbamol (ROBAXIN) 750 MG Tab Take 1 Tablet by mouth 3 times a day as needed (Moderate Pain). 120 Tablet 0    Lido-Capsaicin-Men-Methyl Sal (MEDI-PATCH-LIDOCAINE) 0.5-0.035-5-20 % Patch Apply 1 Patch topically as needed (low back pain). 30 Patch 0    ibuprofen (MOTRIN) 600 MG Tab Take 600 mg by mouth every 6 hours as needed.       No current Epic-ordered facility-administered medications on file.     Family History:    Family History   Problem Relation Age of Onset    Hypertension Other     Cancer Other       Personal/Social History:    Social History     Tobacco Use    Smoking status: Former     Packs/day: 0.50     Types: Cigarettes     Quit date: 2020     Years since quittin.9    Smokeless tobacco: Never    Tobacco comments:     1/2 ppd since 13yo, on chantix   Vaping Use    Vaping Use: Never used   Substance Use Topics    Alcohol use: Not Currently    Drug use: No     Comment: denies      Social History     Social History Narrative    ** Merged History Encounter **           Review of Systems:     General: Negative for fever/chills and unexpected weight change.    Eyes:  Negative for vision changes, eye pain.   Respiratory:  Negative for cough. Positive for dyspnea.   Cardiovascular:  Negative for chest pain and palpitations.   Musculoskeletal: Positive for arm pain.   Skin:  Negative for rash.    Neurological:  Negative for numbness/tingling and headaches.     Objective  Physical Exam:   /78 (BP Location: Left arm, Patient Position: Sitting, BP Cuff Size: Adult)   Pulse 86   Temp 36.2 °C (97.2 °F) (Temporal)   Resp 20   Ht 1.803 m (5' 11\")   Wt 101 kg (222 lb)   SpO2 98%  Body mass index is 30.96 kg/m².  General:  Alert and oriented.  Well appearing.  NAD.  Head:  Normocephalic.   Neck: Supple without JVD. No lymphadenopathy.  Pulmonary:  Normal effort.  Clear " to ausculation without rales, ronchi, or wheezing.  Cardiovascular:  Regular rate and rhythm without murmur, rubs or gallop.  Radial pulses are intact and equal bilaterally.  Musculoskeletal: Left upper extremity, bicep area, tender to palpation.  Skin:  Warm and dry.  No obvious lesions.  Neurologic: Grossly intact.  DTRs 2+/3 bilaterally.  Sensation intact.   Psych: Normal mood and affect. Alert and oriented x3. Judgment and insight is normal.      EKG Interpretation-HR is 72 normal EKG, normal sinus rhythm, there are no previous tracings available for comparison      Assessment/Plan   1. Pain of left upper extremity  Acute and ongoing.  Discussed Methocarbamol risks, benefits and side effects, he verbalized understanding.  Discussed to try the muscle relaxer for at least a week to see if it makes a difference in his arm pain, he verbalized understanding.  - methocarbamol (ROBAXIN) 750 MG Tab; Take 1 Tablet by mouth 3 times a day as needed (Moderate Pain).  Dispense: 120 Tablet; Refill: 0    2. Shortness of breath  Acute and ongoing.  Discussed with him that his EKG was normal sinus rhythm and at this time I am not concerned about a cardiac cause for his shortness of breath.  Discussed in length with him about getting a sleep study done, he is agreeable to this and would he like this referral placed at this time.  - EKG - Clinic Performed  - Referral to Pulmonary and Sleep Medicine    3. Pain of left lower extremity  Acute and ongoing.  He had a previous surgery to his left lower leg and is starting to have some pain from the possible hardware that was placed, he would like to see an Orthopedic specialist to work on a plan of care to get it fixed.  - Referral to Orthopedics    4. Chronic fatigue  Chronic and ongoing.  Due for updated labs.  - VITAMIN D,25 HYDROXY (DEFICIENCY); Future  - TSH WITH REFLEX TO FT4; Future    5. Elevated LDL cholesterol level  Due for updated labs.  - Lipid Profile; Future    6.  Screening for diabetes mellitus  Due for updated labs.  - Comp Metabolic Panel; Future      Health Maintenance: Discussed with patient.    Return in about 4 weeks (around 1/11/2023) for F/U Labs.    I have placed the above orders and discussed them with an approved delegating provider.  The MA is performing the below orders under the direction of Dr. Tu Laurent MD/DO.     Please note that this dictation was created using voice recognition software. I have made every reasonable attempt to correct obvious errors, but I expect that there are errors of grammar and possibly content that I did not discover before finalizing the note.    SHELLEY Maurer  [facility]

## 2022-12-15 NOTE — ASSESSMENT & PLAN NOTE
Acute and ongoing. He states that for the past month every time he lays down, mostly at night he has some shortness of breath. He as been jolted awake from the shortness of breath at night. He said a few times he did have some chest pain with it but not all the time.

## 2022-12-15 NOTE — ASSESSMENT & PLAN NOTE
Acute and ongoing. He states that for the past month he has noticed a squeezing type pain to his left upper arm. He states that it will come and go. He notices that the pain sometimes feels worse when he lays down. He has tried a lidocaine patch but did not feel much improvement.

## 2022-12-19 ENCOUNTER — HOSPITAL ENCOUNTER (OUTPATIENT)
Dept: LAB | Facility: MEDICAL CENTER | Age: 47
End: 2022-12-19
Attending: NURSE PRACTITIONER
Payer: COMMERCIAL

## 2022-12-19 DIAGNOSIS — E78.00 ELEVATED LDL CHOLESTEROL LEVEL: ICD-10-CM

## 2022-12-19 DIAGNOSIS — R53.82 CHRONIC FATIGUE: ICD-10-CM

## 2022-12-19 DIAGNOSIS — Z13.1 SCREENING FOR DIABETES MELLITUS: ICD-10-CM

## 2022-12-19 LAB
25(OH)D3 SERPL-MCNC: 26 NG/ML (ref 30–100)
ALBUMIN SERPL BCP-MCNC: 4.9 G/DL (ref 3.2–4.9)
ALBUMIN/GLOB SERPL: 1.8 G/DL
ALP SERPL-CCNC: 69 U/L (ref 30–99)
ALT SERPL-CCNC: 82 U/L (ref 2–50)
ANION GAP SERPL CALC-SCNC: 12 MMOL/L (ref 7–16)
AST SERPL-CCNC: 39 U/L (ref 12–45)
BILIRUB SERPL-MCNC: 0.7 MG/DL (ref 0.1–1.5)
BUN SERPL-MCNC: 17 MG/DL (ref 8–22)
CALCIUM ALBUM COR SERPL-MCNC: 9 MG/DL (ref 8.5–10.5)
CALCIUM SERPL-MCNC: 9.7 MG/DL (ref 8.5–10.5)
CHLORIDE SERPL-SCNC: 104 MMOL/L (ref 96–112)
CHOLEST SERPL-MCNC: 197 MG/DL (ref 100–199)
CO2 SERPL-SCNC: 27 MMOL/L (ref 20–33)
CREAT SERPL-MCNC: 0.99 MG/DL (ref 0.5–1.4)
FASTING STATUS PATIENT QL REPORTED: NORMAL
GFR SERPLBLD CREATININE-BSD FMLA CKD-EPI: 95 ML/MIN/1.73 M 2
GLOBULIN SER CALC-MCNC: 2.8 G/DL (ref 1.9–3.5)
GLUCOSE SERPL-MCNC: 97 MG/DL (ref 65–99)
HDLC SERPL-MCNC: 39 MG/DL
LDLC SERPL CALC-MCNC: 127 MG/DL
POTASSIUM SERPL-SCNC: 4.4 MMOL/L (ref 3.6–5.5)
PROT SERPL-MCNC: 7.7 G/DL (ref 6–8.2)
SODIUM SERPL-SCNC: 143 MMOL/L (ref 135–145)
TRIGL SERPL-MCNC: 154 MG/DL (ref 0–149)
TSH SERPL DL<=0.005 MIU/L-ACNC: 2.01 UIU/ML (ref 0.38–5.33)

## 2022-12-19 PROCEDURE — 80053 COMPREHEN METABOLIC PANEL: CPT

## 2022-12-19 PROCEDURE — 84443 ASSAY THYROID STIM HORMONE: CPT

## 2022-12-19 PROCEDURE — 82306 VITAMIN D 25 HYDROXY: CPT

## 2022-12-19 PROCEDURE — 80061 LIPID PANEL: CPT

## 2022-12-19 PROCEDURE — 36415 COLL VENOUS BLD VENIPUNCTURE: CPT

## 2022-12-20 PROBLEM — E55.9 VITAMIN D DEFICIENCY: Status: ACTIVE | Noted: 2022-12-20

## 2023-02-16 ENCOUNTER — HOSPITAL ENCOUNTER (OUTPATIENT)
Dept: RADIOLOGY | Facility: MEDICAL CENTER | Age: 48
End: 2023-02-16
Attending: ANESTHESIOLOGY
Payer: COMMERCIAL

## 2023-02-16 ENCOUNTER — HOSPITAL ENCOUNTER (OUTPATIENT)
Dept: LAB | Facility: MEDICAL CENTER | Age: 48
End: 2023-02-16
Attending: ANESTHESIOLOGY
Payer: COMMERCIAL

## 2023-02-16 ENCOUNTER — HOSPITAL ENCOUNTER (OUTPATIENT)
Dept: CARDIOLOGY | Facility: MEDICAL CENTER | Age: 48
End: 2023-02-16
Attending: ANESTHESIOLOGY
Payer: COMMERCIAL

## 2023-02-16 DIAGNOSIS — M79.606 PAIN OF LOWER EXTREMITY, UNSPECIFIED LATERALITY: ICD-10-CM

## 2023-02-16 DIAGNOSIS — Z01.810 PRE-OPERATIVE CARDIOVASCULAR EXAMINATION: ICD-10-CM

## 2023-02-16 LAB
ANION GAP SERPL CALC-SCNC: 10 MMOL/L (ref 7–16)
APPEARANCE UR: CLEAR
APTT PPP: 25.9 SEC (ref 24.7–36)
BASOPHILS # BLD AUTO: 0.3 % (ref 0–1.8)
BASOPHILS # BLD: 0.02 K/UL (ref 0–0.12)
BILIRUB UR QL STRIP.AUTO: NEGATIVE
BUN SERPL-MCNC: 17 MG/DL (ref 8–22)
CALCIUM SERPL-MCNC: 9.7 MG/DL (ref 8.5–10.5)
CHLORIDE SERPL-SCNC: 106 MMOL/L (ref 96–112)
CO2 SERPL-SCNC: 26 MMOL/L (ref 20–33)
COLOR UR: YELLOW
CREAT SERPL-MCNC: 0.91 MG/DL (ref 0.5–1.4)
EKG IMPRESSION: NORMAL
EOSINOPHIL # BLD AUTO: 0.03 K/UL (ref 0–0.51)
EOSINOPHIL NFR BLD: 0.5 % (ref 0–6.9)
ERYTHROCYTE [DISTWIDTH] IN BLOOD BY AUTOMATED COUNT: 41.9 FL (ref 35.9–50)
GFR SERPLBLD CREATININE-BSD FMLA CKD-EPI: 105 ML/MIN/1.73 M 2
GLUCOSE SERPL-MCNC: 97 MG/DL (ref 65–99)
GLUCOSE UR STRIP.AUTO-MCNC: NEGATIVE MG/DL
HCT VFR BLD AUTO: 48.3 % (ref 42–52)
HGB BLD-MCNC: 17.3 G/DL (ref 14–18)
IMM GRANULOCYTES # BLD AUTO: 0.02 K/UL (ref 0–0.11)
IMM GRANULOCYTES NFR BLD AUTO: 0.3 % (ref 0–0.9)
INR PPP: 1.13 (ref 0.87–1.13)
KETONES UR STRIP.AUTO-MCNC: ABNORMAL MG/DL
LEUKOCYTE ESTERASE UR QL STRIP.AUTO: NEGATIVE
LYMPHOCYTES # BLD AUTO: 1.25 K/UL (ref 1–4.8)
LYMPHOCYTES NFR BLD: 21.6 % (ref 22–41)
MCH RBC QN AUTO: 33.7 PG (ref 27–33)
MCHC RBC AUTO-ENTMCNC: 35.8 G/DL (ref 33.7–35.3)
MCV RBC AUTO: 94 FL (ref 81.4–97.8)
MICRO URNS: ABNORMAL
MONOCYTES # BLD AUTO: 0.38 K/UL (ref 0–0.85)
MONOCYTES NFR BLD AUTO: 6.6 % (ref 0–13.4)
NEUTROPHILS # BLD AUTO: 4.09 K/UL (ref 1.82–7.42)
NEUTROPHILS NFR BLD: 70.7 % (ref 44–72)
NITRITE UR QL STRIP.AUTO: NEGATIVE
NRBC # BLD AUTO: 0 K/UL
NRBC BLD-RTO: 0 /100 WBC
PH UR STRIP.AUTO: 7 [PH] (ref 5–8)
PLATELET # BLD AUTO: 226 K/UL (ref 164–446)
PMV BLD AUTO: 9.4 FL (ref 9–12.9)
POTASSIUM SERPL-SCNC: 3.9 MMOL/L (ref 3.6–5.5)
PROT UR QL STRIP: NEGATIVE MG/DL
PROTHROMBIN TIME: 14.3 SEC (ref 12–14.6)
RBC # BLD AUTO: 5.14 M/UL (ref 4.7–6.1)
RBC UR QL AUTO: NEGATIVE
SODIUM SERPL-SCNC: 142 MMOL/L (ref 135–145)
SP GR UR STRIP.AUTO: 1.03
UROBILINOGEN UR STRIP.AUTO-MCNC: 1 MG/DL
WBC # BLD AUTO: 5.8 K/UL (ref 4.8–10.8)

## 2023-02-16 PROCEDURE — 36415 COLL VENOUS BLD VENIPUNCTURE: CPT

## 2023-02-16 PROCEDURE — 80048 BASIC METABOLIC PNL TOTAL CA: CPT

## 2023-02-16 PROCEDURE — 85610 PROTHROMBIN TIME: CPT

## 2023-02-16 PROCEDURE — 93005 ELECTROCARDIOGRAM TRACING: CPT

## 2023-02-16 PROCEDURE — 85025 COMPLETE CBC W/AUTO DIFF WBC: CPT

## 2023-02-16 PROCEDURE — 93010 ELECTROCARDIOGRAM REPORT: CPT | Performed by: INTERNAL MEDICINE

## 2023-02-16 PROCEDURE — 85730 THROMBOPLASTIN TIME PARTIAL: CPT

## 2023-02-16 PROCEDURE — 81003 URINALYSIS AUTO W/O SCOPE: CPT

## 2023-02-16 PROCEDURE — 71046 X-RAY EXAM CHEST 2 VIEWS: CPT

## 2023-06-28 ENCOUNTER — APPOINTMENT (OUTPATIENT)
Dept: ADMISSIONS | Facility: MEDICAL CENTER | Age: 48
End: 2023-06-28
Attending: ORTHOPAEDIC SURGERY
Payer: COMMERCIAL

## 2023-06-28 PROBLEM — T79.A0XA COMPARTMENT SYNDROME (HCC): Status: ACTIVE | Noted: 2023-06-28

## 2023-06-29 ENCOUNTER — ANESTHESIA EVENT (OUTPATIENT)
Dept: SURGERY | Facility: MEDICAL CENTER | Age: 48
End: 2023-06-29
Payer: COMMERCIAL

## 2023-06-29 ENCOUNTER — HOSPITAL ENCOUNTER (OUTPATIENT)
Facility: MEDICAL CENTER | Age: 48
End: 2023-06-30
Attending: ORTHOPAEDIC SURGERY | Admitting: ORTHOPAEDIC SURGERY
Payer: COMMERCIAL

## 2023-06-29 ENCOUNTER — ANESTHESIA (OUTPATIENT)
Dept: SURGERY | Facility: MEDICAL CENTER | Age: 48
End: 2023-06-29
Payer: COMMERCIAL

## 2023-06-29 DIAGNOSIS — M79.A22 NONTRAUMATIC COMPARTMENT SYNDROME OF LEFT LOWER EXTREMITY: ICD-10-CM

## 2023-06-29 DIAGNOSIS — M79.A11 NON-TRAUMATIC COMPARTMENT SYNDROME OF RIGHT UPPER EXTREMITY: ICD-10-CM

## 2023-06-29 PROCEDURE — 160038 HCHG SURGERY MINUTES - EA ADDL 1 MIN LEVEL 2: Performed by: ORTHOPAEDIC SURGERY

## 2023-06-29 PROCEDURE — 700111 HCHG RX REV CODE 636 W/ 250 OVERRIDE (IP): Performed by: INTERNAL MEDICINE

## 2023-06-29 PROCEDURE — 160009 HCHG ANES TIME/MIN: Performed by: ORTHOPAEDIC SURGERY

## 2023-06-29 PROCEDURE — A9270 NON-COVERED ITEM OR SERVICE: HCPCS | Performed by: ORTHOPAEDIC SURGERY

## 2023-06-29 PROCEDURE — 160036 HCHG PACU - EA ADDL 30 MINS PHASE I: Performed by: ORTHOPAEDIC SURGERY

## 2023-06-29 PROCEDURE — 700105 HCHG RX REV CODE 258: Mod: JZ | Performed by: ORTHOPAEDIC SURGERY

## 2023-06-29 PROCEDURE — 700101 HCHG RX REV CODE 250: Performed by: INTERNAL MEDICINE

## 2023-06-29 PROCEDURE — 700111 HCHG RX REV CODE 636 W/ 250 OVERRIDE (IP): Performed by: ORTHOPAEDIC SURGERY

## 2023-06-29 PROCEDURE — G0378 HOSPITAL OBSERVATION PER HR: HCPCS

## 2023-06-29 PROCEDURE — 160048 HCHG OR STATISTICAL LEVEL 1-5: Performed by: ORTHOPAEDIC SURGERY

## 2023-06-29 PROCEDURE — 27656 REPAIR LEG FASCIA DEFECT: CPT | Mod: LT | Performed by: ORTHOPAEDIC SURGERY

## 2023-06-29 PROCEDURE — 160027 HCHG SURGERY MINUTES - 1ST 30 MINS LEVEL 2: Performed by: ORTHOPAEDIC SURGERY

## 2023-06-29 PROCEDURE — 700111 HCHG RX REV CODE 636 W/ 250 OVERRIDE (IP): Mod: JZ | Performed by: ORTHOPAEDIC SURGERY

## 2023-06-29 PROCEDURE — 96375 TX/PRO/DX INJ NEW DRUG ADDON: CPT

## 2023-06-29 PROCEDURE — 27656 REPAIR LEG FASCIA DEFECT: CPT | Mod: 80ROC,LT | Performed by: STUDENT IN AN ORGANIZED HEALTH CARE EDUCATION/TRAINING PROGRAM

## 2023-06-29 PROCEDURE — 160035 HCHG PACU - 1ST 60 MINS PHASE I: Performed by: ORTHOPAEDIC SURGERY

## 2023-06-29 PROCEDURE — 01470 ANES PX NRV MSC LW L/A/F NOS: CPT | Performed by: INTERNAL MEDICINE

## 2023-06-29 PROCEDURE — A9270 NON-COVERED ITEM OR SERVICE: HCPCS | Performed by: INTERNAL MEDICINE

## 2023-06-29 PROCEDURE — 700105 HCHG RX REV CODE 258: Performed by: INTERNAL MEDICINE

## 2023-06-29 PROCEDURE — 700102 HCHG RX REV CODE 250 W/ 637 OVERRIDE(OP): Performed by: ORTHOPAEDIC SURGERY

## 2023-06-29 PROCEDURE — 160002 HCHG RECOVERY MINUTES (STAT): Performed by: ORTHOPAEDIC SURGERY

## 2023-06-29 PROCEDURE — 700102 HCHG RX REV CODE 250 W/ 637 OVERRIDE(OP): Performed by: INTERNAL MEDICINE

## 2023-06-29 PROCEDURE — 96376 TX/PRO/DX INJ SAME DRUG ADON: CPT

## 2023-06-29 PROCEDURE — 96374 THER/PROPH/DIAG INJ IV PUSH: CPT

## 2023-06-29 RX ORDER — IBUPROFEN 800 MG/1
800 TABLET ORAL 3 TIMES DAILY PRN
Status: DISCONTINUED | OUTPATIENT
Start: 2023-07-02 | End: 2023-06-30 | Stop reason: HOSPADM

## 2023-06-29 RX ORDER — HYDROMORPHONE HYDROCHLORIDE 2 MG/ML
INJECTION, SOLUTION INTRAMUSCULAR; INTRAVENOUS; SUBCUTANEOUS PRN
Status: DISCONTINUED | OUTPATIENT
Start: 2023-06-29 | End: 2023-06-29 | Stop reason: SURG

## 2023-06-29 RX ORDER — ACETAMINOPHEN 10 MG/ML
1000 INJECTION, SOLUTION INTRAVENOUS ONCE
Status: COMPLETED | OUTPATIENT
Start: 2023-06-29 | End: 2023-06-29

## 2023-06-29 RX ORDER — CEFAZOLIN SODIUM 1 G/3ML
2 INJECTION, POWDER, FOR SOLUTION INTRAMUSCULAR; INTRAVENOUS ONCE
Status: COMPLETED | OUTPATIENT
Start: 2023-06-29 | End: 2023-06-29

## 2023-06-29 RX ORDER — HYDRALAZINE HYDROCHLORIDE 20 MG/ML
5 INJECTION INTRAMUSCULAR; INTRAVENOUS
Status: DISCONTINUED | OUTPATIENT
Start: 2023-06-29 | End: 2023-06-29 | Stop reason: HOSPADM

## 2023-06-29 RX ORDER — OXYCODONE HYDROCHLORIDE 5 MG/1
5 TABLET ORAL EVERY 4 HOURS PRN
Qty: 20 TABLET | Refills: 0 | Status: SHIPPED | OUTPATIENT
Start: 2023-06-29 | End: 2023-06-30

## 2023-06-29 RX ORDER — HYDROMORPHONE HYDROCHLORIDE 1 MG/ML
0.4 INJECTION, SOLUTION INTRAMUSCULAR; INTRAVENOUS; SUBCUTANEOUS
Status: DISCONTINUED | OUTPATIENT
Start: 2023-06-29 | End: 2023-06-29 | Stop reason: HOSPADM

## 2023-06-29 RX ORDER — ACETAMINOPHEN 500 MG
1000 TABLET ORAL EVERY 6 HOURS
Status: DISCONTINUED | OUTPATIENT
Start: 2023-06-30 | End: 2023-06-30

## 2023-06-29 RX ORDER — HYDROMORPHONE HYDROCHLORIDE 1 MG/ML
0.5 INJECTION, SOLUTION INTRAMUSCULAR; INTRAVENOUS; SUBCUTANEOUS
Status: DISCONTINUED | OUTPATIENT
Start: 2023-06-29 | End: 2023-06-30 | Stop reason: HOSPADM

## 2023-06-29 RX ORDER — HYDROMORPHONE HYDROCHLORIDE 1 MG/ML
0.1 INJECTION, SOLUTION INTRAMUSCULAR; INTRAVENOUS; SUBCUTANEOUS
Status: DISCONTINUED | OUTPATIENT
Start: 2023-06-29 | End: 2023-06-29 | Stop reason: HOSPADM

## 2023-06-29 RX ORDER — HALOPERIDOL 5 MG/ML
1 INJECTION INTRAMUSCULAR
Status: DISCONTINUED | OUTPATIENT
Start: 2023-06-29 | End: 2023-06-29 | Stop reason: HOSPADM

## 2023-06-29 RX ORDER — OXYCODONE HYDROCHLORIDE 10 MG/1
10 TABLET ORAL
Status: DISCONTINUED | OUTPATIENT
Start: 2023-06-29 | End: 2023-06-30

## 2023-06-29 RX ORDER — KETOROLAC TROMETHAMINE 30 MG/ML
INJECTION, SOLUTION INTRAMUSCULAR; INTRAVENOUS PRN
Status: DISCONTINUED | OUTPATIENT
Start: 2023-06-29 | End: 2023-06-29 | Stop reason: SURG

## 2023-06-29 RX ORDER — DIPHENHYDRAMINE HYDROCHLORIDE 50 MG/ML
12.5 INJECTION INTRAMUSCULAR; INTRAVENOUS
Status: DISCONTINUED | OUTPATIENT
Start: 2023-06-29 | End: 2023-06-29 | Stop reason: HOSPADM

## 2023-06-29 RX ORDER — MEPERIDINE HYDROCHLORIDE 25 MG/ML
12.5 INJECTION INTRAMUSCULAR; INTRAVENOUS; SUBCUTANEOUS
Status: DISCONTINUED | OUTPATIENT
Start: 2023-06-29 | End: 2023-06-29 | Stop reason: HOSPADM

## 2023-06-29 RX ORDER — DEXAMETHASONE SODIUM PHOSPHATE 4 MG/ML
INJECTION, SOLUTION INTRA-ARTICULAR; INTRALESIONAL; INTRAMUSCULAR; INTRAVENOUS; SOFT TISSUE PRN
Status: DISCONTINUED | OUTPATIENT
Start: 2023-06-29 | End: 2023-06-29 | Stop reason: SURG

## 2023-06-29 RX ORDER — MIDAZOLAM HYDROCHLORIDE 1 MG/ML
INJECTION INTRAMUSCULAR; INTRAVENOUS PRN
Status: DISCONTINUED | OUTPATIENT
Start: 2023-06-29 | End: 2023-06-29 | Stop reason: SURG

## 2023-06-29 RX ORDER — DIAZEPAM 5 MG/ML
5 INJECTION, SOLUTION INTRAMUSCULAR; INTRAVENOUS ONCE
Status: COMPLETED | OUTPATIENT
Start: 2023-06-29 | End: 2023-06-29

## 2023-06-29 RX ORDER — KETOROLAC TROMETHAMINE 30 MG/ML
30 INJECTION, SOLUTION INTRAMUSCULAR; INTRAVENOUS EVERY 6 HOURS
Status: DISCONTINUED | OUTPATIENT
Start: 2023-06-29 | End: 2023-06-30 | Stop reason: HOSPADM

## 2023-06-29 RX ORDER — ACETAMINOPHEN 500 MG
1000 TABLET ORAL EVERY 6 HOURS PRN
Status: DISCONTINUED | OUTPATIENT
Start: 2023-07-05 | End: 2023-06-30

## 2023-06-29 RX ORDER — LABETALOL HYDROCHLORIDE 5 MG/ML
5 INJECTION, SOLUTION INTRAVENOUS
Status: DISCONTINUED | OUTPATIENT
Start: 2023-06-29 | End: 2023-06-29 | Stop reason: HOSPADM

## 2023-06-29 RX ORDER — LIDOCAINE HYDROCHLORIDE 20 MG/ML
INJECTION, SOLUTION EPIDURAL; INFILTRATION; INTRACAUDAL; PERINEURAL PRN
Status: DISCONTINUED | OUTPATIENT
Start: 2023-06-29 | End: 2023-06-29 | Stop reason: SURG

## 2023-06-29 RX ORDER — ONDANSETRON 2 MG/ML
INJECTION INTRAMUSCULAR; INTRAVENOUS PRN
Status: DISCONTINUED | OUTPATIENT
Start: 2023-06-29 | End: 2023-06-29 | Stop reason: SURG

## 2023-06-29 RX ORDER — OXYCODONE HYDROCHLORIDE 5 MG/1
5 TABLET ORAL
Status: DISCONTINUED | OUTPATIENT
Start: 2023-06-29 | End: 2023-06-30

## 2023-06-29 RX ORDER — SODIUM CHLORIDE, SODIUM LACTATE, POTASSIUM CHLORIDE, CALCIUM CHLORIDE 600; 310; 30; 20 MG/100ML; MG/100ML; MG/100ML; MG/100ML
INJECTION, SOLUTION INTRAVENOUS CONTINUOUS
Status: ACTIVE | OUTPATIENT
Start: 2023-06-29 | End: 2023-06-29

## 2023-06-29 RX ORDER — HYDROMORPHONE HYDROCHLORIDE 1 MG/ML
0.2 INJECTION, SOLUTION INTRAMUSCULAR; INTRAVENOUS; SUBCUTANEOUS
Status: DISCONTINUED | OUTPATIENT
Start: 2023-06-29 | End: 2023-06-29 | Stop reason: HOSPADM

## 2023-06-29 RX ORDER — ONDANSETRON 2 MG/ML
4 INJECTION INTRAMUSCULAR; INTRAVENOUS
Status: DISCONTINUED | OUTPATIENT
Start: 2023-06-29 | End: 2023-06-29 | Stop reason: HOSPADM

## 2023-06-29 RX ORDER — OXYCODONE HCL 5 MG/5 ML
5 SOLUTION, ORAL ORAL
Status: COMPLETED | OUTPATIENT
Start: 2023-06-29 | End: 2023-06-29

## 2023-06-29 RX ORDER — OXYCODONE HCL 5 MG/5 ML
10 SOLUTION, ORAL ORAL
Status: COMPLETED | OUTPATIENT
Start: 2023-06-29 | End: 2023-06-29

## 2023-06-29 RX ADMIN — PROPOFOL 200 MG: 10 INJECTION, EMULSION INTRAVENOUS at 14:14

## 2023-06-29 RX ADMIN — HYDROMORPHONE HYDROCHLORIDE 0.4 MG: 1 INJECTION, SOLUTION INTRAMUSCULAR; INTRAVENOUS; SUBCUTANEOUS at 16:08

## 2023-06-29 RX ADMIN — MIDAZOLAM 2 MG: 1 INJECTION, SOLUTION INTRAMUSCULAR; INTRAVENOUS at 14:10

## 2023-06-29 RX ADMIN — ACETAMINOPHEN 1000 MG: 10 INJECTION INTRAVENOUS at 17:13

## 2023-06-29 RX ADMIN — HYDROMORPHONE HYDROCHLORIDE 0.5 MG: 2 INJECTION INTRAMUSCULAR; INTRAVENOUS; SUBCUTANEOUS at 14:23

## 2023-06-29 RX ADMIN — HYDROMORPHONE HYDROCHLORIDE 0.4 MG: 1 INJECTION, SOLUTION INTRAMUSCULAR; INTRAVENOUS; SUBCUTANEOUS at 15:13

## 2023-06-29 RX ADMIN — OXYCODONE HYDROCHLORIDE 10 MG: 10 TABLET ORAL at 23:37

## 2023-06-29 RX ADMIN — HYDROMORPHONE HYDROCHLORIDE 0.4 MG: 1 INJECTION, SOLUTION INTRAMUSCULAR; INTRAVENOUS; SUBCUTANEOUS at 15:35

## 2023-06-29 RX ADMIN — HYDROMORPHONE HYDROCHLORIDE 0.4 MG: 1 INJECTION, SOLUTION INTRAMUSCULAR; INTRAVENOUS; SUBCUTANEOUS at 15:30

## 2023-06-29 RX ADMIN — HYDROMORPHONE HYDROCHLORIDE 0.2 MG: 1 INJECTION, SOLUTION INTRAMUSCULAR; INTRAVENOUS; SUBCUTANEOUS at 16:20

## 2023-06-29 RX ADMIN — ACETAMINOPHEN 1000 MG: 500 TABLET, FILM COATED ORAL at 23:40

## 2023-06-29 RX ADMIN — OXYCODONE HYDROCHLORIDE 10 MG: 10 TABLET ORAL at 19:55

## 2023-06-29 RX ADMIN — FENTANYL CITRATE 50 MCG: 50 INJECTION, SOLUTION INTRAMUSCULAR; INTRAVENOUS at 14:33

## 2023-06-29 RX ADMIN — DIAZEPAM 5 MG: 5 INJECTION, SOLUTION INTRAMUSCULAR; INTRAVENOUS at 17:52

## 2023-06-29 RX ADMIN — OXYCODONE HYDROCHLORIDE 10 MG: 5 SOLUTION ORAL at 14:59

## 2023-06-29 RX ADMIN — HYDROMORPHONE HYDROCHLORIDE 0.5 MG: 1 INJECTION, SOLUTION INTRAMUSCULAR; INTRAVENOUS; SUBCUTANEOUS at 21:06

## 2023-06-29 RX ADMIN — KETOROLAC TROMETHAMINE 30 MG: 30 INJECTION, SOLUTION INTRAMUSCULAR; INTRAVENOUS at 23:40

## 2023-06-29 RX ADMIN — FENTANYL CITRATE 50 MCG: 50 INJECTION, SOLUTION INTRAMUSCULAR; INTRAVENOUS at 16:51

## 2023-06-29 RX ADMIN — FENTANYL CITRATE 50 MCG: 50 INJECTION, SOLUTION INTRAMUSCULAR; INTRAVENOUS at 15:11

## 2023-06-29 RX ADMIN — HYDROMORPHONE HYDROCHLORIDE 0.4 MG: 1 INJECTION, SOLUTION INTRAMUSCULAR; INTRAVENOUS; SUBCUTANEOUS at 15:20

## 2023-06-29 RX ADMIN — KETOROLAC TROMETHAMINE 15 MG: 30 INJECTION, SOLUTION INTRAMUSCULAR; INTRAVENOUS at 14:39

## 2023-06-29 RX ADMIN — HYDROMORPHONE HYDROCHLORIDE 0.5 MG: 2 INJECTION INTRAMUSCULAR; INTRAVENOUS; SUBCUTANEOUS at 14:14

## 2023-06-29 RX ADMIN — FENTANYL CITRATE 50 MCG: 50 INJECTION, SOLUTION INTRAMUSCULAR; INTRAVENOUS at 14:41

## 2023-06-29 RX ADMIN — FENTANYL CITRATE 50 MCG: 50 INJECTION, SOLUTION INTRAMUSCULAR; INTRAVENOUS at 15:05

## 2023-06-29 RX ADMIN — ONDANSETRON 4 MG: 2 INJECTION INTRAMUSCULAR; INTRAVENOUS at 14:39

## 2023-06-29 RX ADMIN — CEFAZOLIN 2 G: 1 INJECTION, POWDER, FOR SOLUTION INTRAMUSCULAR; INTRAVENOUS at 14:17

## 2023-06-29 RX ADMIN — SODIUM CHLORIDE, POTASSIUM CHLORIDE, SODIUM LACTATE AND CALCIUM CHLORIDE: 600; 310; 30; 20 INJECTION, SOLUTION INTRAVENOUS at 14:10

## 2023-06-29 RX ADMIN — KETOROLAC TROMETHAMINE 30 MG: 30 INJECTION, SOLUTION INTRAMUSCULAR; INTRAVENOUS at 19:54

## 2023-06-29 RX ADMIN — HYDROMORPHONE HYDROCHLORIDE 0.4 MG: 1 INJECTION, SOLUTION INTRAMUSCULAR; INTRAVENOUS; SUBCUTANEOUS at 15:59

## 2023-06-29 RX ADMIN — LIDOCAINE HYDROCHLORIDE 100 MG: 20 INJECTION, SOLUTION EPIDURAL; INFILTRATION; INTRACAUDAL at 14:14

## 2023-06-29 RX ADMIN — HYDROMORPHONE HYDROCHLORIDE 0.2 MG: 1 INJECTION, SOLUTION INTRAMUSCULAR; INTRAVENOUS; SUBCUTANEOUS at 15:42

## 2023-06-29 RX ADMIN — DEXAMETHASONE SODIUM PHOSPHATE 8 MG: 4 INJECTION INTRA-ARTICULAR; INTRALESIONAL; INTRAMUSCULAR; INTRAVENOUS; SOFT TISSUE at 14:14

## 2023-06-29 RX ADMIN — SODIUM CHLORIDE, POTASSIUM CHLORIDE, SODIUM LACTATE AND CALCIUM CHLORIDE: 600; 310; 30; 20 INJECTION, SOLUTION INTRAVENOUS at 14:38

## 2023-06-29 RX ADMIN — HYDROMORPHONE HYDROCHLORIDE 0.2 MG: 1 INJECTION, SOLUTION INTRAMUSCULAR; INTRAVENOUS; SUBCUTANEOUS at 15:25

## 2023-06-29 RX ADMIN — METHOCARBAMOL 1000 MG: 100 INJECTION, SOLUTION INTRAMUSCULAR; INTRAVENOUS at 16:22

## 2023-06-29 RX ADMIN — FENTANYL CITRATE 50 MCG: 50 INJECTION, SOLUTION INTRAMUSCULAR; INTRAVENOUS at 16:46

## 2023-06-29 ASSESSMENT — LIFESTYLE VARIABLES
AVERAGE NUMBER OF DAYS PER WEEK YOU HAVE A DRINK CONTAINING ALCOHOL: 0
HOW MANY TIMES IN THE PAST YEAR HAVE YOU HAD 5 OR MORE DRINKS IN A DAY: 0
EVER HAD A DRINK FIRST THING IN THE MORNING TO STEADY YOUR NERVES TO GET RID OF A HANGOVER: NO
HAVE PEOPLE ANNOYED YOU BY CRITICIZING YOUR DRINKING: NO
CONSUMPTION TOTAL: NEGATIVE
ALCOHOL_USE: NO
HAVE YOU EVER FELT YOU SHOULD CUT DOWN ON YOUR DRINKING: NO
TOTAL SCORE: 0
TOTAL SCORE: 0
ON A TYPICAL DAY WHEN YOU DRINK ALCOHOL HOW MANY DRINKS DO YOU HAVE: 0
TOTAL SCORE: 0
EVER FELT BAD OR GUILTY ABOUT YOUR DRINKING: NO

## 2023-06-29 ASSESSMENT — PAIN DESCRIPTION - PAIN TYPE
TYPE: SURGICAL PAIN

## 2023-06-29 ASSESSMENT — PATIENT HEALTH QUESTIONNAIRE - PHQ9
2. FEELING DOWN, DEPRESSED, IRRITABLE, OR HOPELESS: NOT AT ALL
SUM OF ALL RESPONSES TO PHQ9 QUESTIONS 1 AND 2: 0
1. LITTLE INTEREST OR PLEASURE IN DOING THINGS: NOT AT ALL

## 2023-06-29 ASSESSMENT — FIBROSIS 4 INDEX: FIB4 SCORE: 0.9

## 2023-06-29 NOTE — DISCHARGE INSTRUCTIONS
HOME CARE INSTRUCTIONS    ACTIVITY: Rest and take it easy for the first 24 hours.  A responsible adult is recommended to remain with you during that time.  It is normal to feel sleepy.  We encourage you to not do anything that requires balance, judgment or coordination.    FOR 24 HOURS DO NOT:  Drive, operate machinery or run household appliances.  Drink beer or alcoholic beverages.  Make important decisions or sign legal documents.    SPECIAL INSTRUCTIONS:        Post-Operative Plan:     1.  The patient should remain full weightbearing through heel  on their operative extremity.  Gait aids (crutch or crutches, cane, walker) may be used as needed, and may be discontinued when no longer required.  2.  IV antibiotics - will be given postoperatively and adjusted by the Infectious Disease service as dictated by culture results.  3.  The patient may transition to Aspirin 325 mg PO BID as an outpatient    DIET: To avoid nausea, slowly advance diet as tolerated, avoiding spicy or greasy foods for the first day.  Add more substantial food to your diet according to your physician's instructions.  Babies can be fed formula or breast milk as soon as they are hungry.  INCREASE FLUIDS AND FIBER TO AVOID CONSTIPATION.    SURGICAL DRESSING/BATHING: maintain dressing to be clean and dry. Okay to shower with dressing covered. Do not submerge in water until cleared by MD.    MEDICATIONS: Resume taking daily medication.  Take prescribed pain medication with food.  If no medication is prescribed, you may take non-aspirin pain medication if needed.  PAIN MEDICATION CAN BE VERY CONSTIPATING.  Take a stool softener or laxative such as senokot, pericolace, or milk of magnesia if needed.    Prescription given for oxycodone.  Last pain medication given at _______.    A follow-up appointment should be arranged with your doctor in 1-2 weeks; call to schedule.    You should CALL YOUR PHYSICIAN if you develop:  Fever greater than 101 degrees  F.  Pain not relieved by medication, or persistent nausea or vomiting.  Excessive bleeding (blood soaking through dressing) or unexpected drainage from the wound.  Extreme redness or swelling around the incision site, drainage of pus or foul smelling drainage.  Inability to urinate or empty your bladder within 8 hours.  Problems with breathing or chest pain.    You should call 911 if you develop problems with breathing or chest pain.  If you are unable to contact your doctor or surgical center, you should go to the nearest emergency room or urgent care center.  Physician's telephone #: Dr. Arias [phone].    MILD FLU-LIKE SYMPTOMS ARE NORMAL.  YOU MAY EXPERIENCE GENERALIZED MUSCLE ACHES, THROAT IRRITATION, HEADACHE AND/OR SOME NAUSEA.    If any questions arise, call your doctor.  If your doctor is not available, please feel free to call the Surgical Center at [phone].  The Center is open Monday through Friday from 7AM to 7PM.      A registered nurse may call you a few days after your surgery to see how you are doing after your procedure.    You may also receive a survey in the mail within the next two weeks and we ask that you take a few moments to complete the survey and return it to us.  Our goal is to provide you with very good care and we value your comments.     Depression / Suicide Risk    As you are discharged from this [facility] facility, it is important to learn how to keep safe from harming yourself.    Recognize the warning signs:  Abrupt changes in personality, positive or negative- including increase in energy   Giving away possessions  Change in eating patterns- significant weight changes-  positive or negative  Change in sleeping patterns- unable to sleep or sleeping all the time   Unwillingness or inability to communicate  Depression  Unusual sadness, discouragement and loneliness  Talk of wanting to die  Neglect of personal appearance   Rebelliousness- reckless  behavior  Withdrawal from people/activities they love  Confusion- inability to concentrate     If you or a loved one observes any of these behaviors or has concerns about self-harm, here's what you can do:  Talk about it- your feelings and reasons for harming yourself  Remove any means that you might use to hurt yourself (examples: pills, rope, extension cords, firearm)  Get professional help from the community (Mental Health, Substance Abuse, psychological counseling)  Do not be alone:Call your Safe Contact- someone whom you trust who will be there for you.  Call your local CRISIS HOTLINE 425-2027 or 506-926-3118  Call your local Children's Mobile Crisis Response Team [place] (705) 642-1290 or www.goTaja.com  Call the toll free National Suicide Prevention Hotlines   National Suicide Prevention Lifeline 284-950-PKLI (0954)  National Hope Line Network 800-SUICIDE (058-6825)    I acknowledge receipt and understanding of these Home Care instructions.

## 2023-06-29 NOTE — ANESTHESIA PROCEDURE NOTES
Airway    Date/Time: 6/29/2023 2:15 PM    Performed by: Schuyler Whyte M.D.  Authorized by: Schuyler Whyte M.D.    Location:  OR  Urgency:  Elective  Indications for Airway Management:  Anesthesia      Spontaneous Ventilation: absent    Sedation Level:  Deep  Preoxygenated: Yes    Final Airway Type:  Supraglottic airway  Final Supraglottic Airway:  Standard LMA    SGA Size:  4  Number of Attempts at Approach:  1

## 2023-06-29 NOTE — ANESTHESIA TIME REPORT
Anesthesia Start and Stop Event Times     Date Time Event    6/29/2023 1400 Ready for Procedure     1410 Anesthesia Start     1500 Anesthesia Stop        Responsible Staff  06/29/23    Name Role Begin End    Schuyler Whyte M.D. Anesth 1410 1500        Overtime Reason:  no overtime (within assigned shift)    Comments:

## 2023-06-29 NOTE — LETTER
June 28, 2023    Patient Name: Jarod Fields  Surgeon Name: Max Arias M.D.  Surgery Facility: [facility] ([address] [facility])  Surgery Date: 6/30/2023    The time of your surgery is not final and may change up to and until the day of your surgery. You will be contacted 24-48 hours prior to your surgery date with your check-in and surgery time.    If you will not be at one of the below numbers please call the surgery scheduler at [phone]  Preferred Phone: [phone]    BEFORE YOUR SURGERY   Pre Registration and/or Lab Work must be done within and no earlier than 28 days prior to your surgery date. Please call [facility] at [phone] for an appointment as soon as possible.    DAY OF YOUR SURGERY  Nothing to eat or drink after midnight     Refrain from smoking any substance after midnight prior to surgery. Smoking may interfere with the anesthetic and frequently produces nausea during the recovery period.    Continue taking all lifesaving medications. Including the morning of your surgery with small sip of water.    An adult will need to bring you and take you home after your surgery.     AFTER YOUR SURGERY  Post op Appointment:   Date: 7.12.23   Time: 9:30 AM   With: Yaniv Rider PA-C    Location: [address] [place] [address]    - Post Surgery - You will need someone to drive you home  - Post Surgery - You will need someone to stay with you for 24 hours       TIME OFF WORK  FMLA or Disability forms can be faxed directly to: [phone] or you may drop them off at [address] [place] [address]. Our office charges a $35.00 fee per form. Forms will be completed within 10 business days of drop off and payment received. For the status of your forms you may contact our disability office directly [phone].    MEDICATION INSTRUCTIONS **Please read section completely**    The following medications should be stopped a minimum of 10 days prior  to surgery:  All over the counter, Supplements & Herbal medications    Anorectics: Phentermine (Adipex-P, Lomaira and Suprenza), Phentermine-topiramate (Qsymia), Bupropion-naltrexone (Contrave)    Opiod Partial Agonists/Opioid Antagonists: Buprenorphine (Subocone, Belbuca, Butrans, Probuphine Implant, Sublocade), Naltrexone (ReVia, Vivitrol), Naloxone    Amphetamines: Dextroamphetamine/Amphetamine (Adderall, Mydayis), Methylphenidate Hydrochloride (Concerta, Metadate, Methylin, Ritalin)    The following medications should be stopped 5 days prior to surgery:  Blood Thinners: Any Aspirin, Aspirin products, anti-inflammatories such as ibuprofen and any blood thinners such as Coumadin and Plavix. Please consult your prescribing physician if you are on life saving blood thinners, in regards to when to stop medications prior to surgery.     The following medications should be stopped a minimum of 3 days prior to surgery:  PDE-5 inhibitors: Sildenafil (Viagra), Tadalafil (Cialis), Vardenafil (Levitra), Avanafil (Stendra)    MAO Inhibitors: Rasagiline (Azilect), Selegiline (Eldepryl, Emsam, Selapar), Isocarboxazid (Marplan), Phenelzine (Nardil)

## 2023-06-29 NOTE — OR NURSING
1456- Patient arrival to PACU from OR. Restless, moaning and guarding left lower leg with c/o 10/10 pressure and throbbing pain. Anesthesia at bedside, medicated patient. Emotional support provided.     1500- Patient c/o persistent 10/10 LLE pain, moaning and crying. Oral and IV pain medication administered, emotional support provided. Left LE elevated on pillows and ice applied. Surgical dressing CDI. Toes and foot pink, warm, brisk cap refill, full sensation with palpable pedal pulses. VSS, afebrile, room air 96% breathing even and unlabored.     1535- Patient having consistent 10/10 LLE pressure and throbbing pain despite medication, emotional support and breathing exercises. Dr Whyte aware, order received for robaxin IV. Dr Arias notified of patients uncontrollable pain. Will come assess patient once out of surgery. Patients significant other updated on status and plan of care.     1630- Dr Whyte notified that LLE pain is unchanged, 10/10. Order for IV acetaminophen placed. Left LE elevated, dressing CDI. CMS intact, skin pink, warm, brisk cap refill, no pain in toes with movement. VSS, afebrile, 97% 2L nasal cannula.      1658- Dr Arias at bedside to assess patient. Plan to admit for observation for pain control.     1707-Patients wife at bedside, supportive of patient. Will continue to monitor closely.

## 2023-06-29 NOTE — OP REPORT
DATE: 6/29/2023    PREOPERATIVE DIAGNOSIS: Left leg fascial defect    POSTOPERATIVE DIAGNOSIS: Same    PROCEDURE PERFORMED: Repair left leg fascial defect                                                      SURGEON: Max Arias M.D.     ASSISTANT: Grupo Pacheco    ANESTHESIOLOGIST: MD Isabell    ANESTHESIA: General    ESTIMATED BLOOD LOSS: 0 mL    INDICATIONS: The patient is a 48 y.o. male with a left tibialis anterior fascial defect after surgery 20 years ago.  This has been bothering him cosmetically and causing him pain with activity..  I discussed the risks and benefits of the procedure, including the risks of infection, wound healing complication, neurovascular injury, compartment syndrome and the medical risks of anesthesia including DVT, PE, MI, stroke, and death.  .  Alternatives to surgery were also discussed, including non-operative management.  The patient signed the informed consent and the operative extremity was marked.      PROCEDURE:  The patient underwent anesthesia, and was positioned supine on the operating room table and all bony prominences were well padded.  Preoperative antibiotics were held until cultures could be obtained. Sequential compression devices were employed. The correct operative site was prepped and draped into a sterile field. A procedural pause was conducted to verify correct patient, correct extremity, presence of the surgeons initials on the operative extremity.    The previous incision was reopened and extended proximally and distally to expose intact fascia medially laterally superiorly and inferiorly.  The muscle was gently freed from the fascial scar and the fascia was able to be closed with 0 Vicryl suture.  Skin was closed with 2-0 Vicryl suture and 3-0 nylon suture.  Sterile dressings were applied.     The patient tolerated the procedure well. There were no apparent complications. All sponge, needle, and instrument counts were correct on two separate  occasions. They were awakened, extubated, and transferred to the recovery room in satisfactory condition.     The use of [employer] Pacheco as a surgical assistant was necessary for assistance with exposure, retraction, and closure.    Post-Operative Plan:    1.  The patient should remain full weightbearing through heel  on their operative extremity.  Gait aids (crutch or crutches, cane, walker) may be used as needed, and may be discontinued when no longer required.  2.  IV antibiotics - will be given postoperatively and adjusted by the Infectious Disease service as dictated by culture results.  3.  DVT prophylaxis - SCD's and Lovenox 40 mg SQ daily while inpatient.  The patient may transition to Aspirin 325 mg PO BID as an outpatient  4.  Discharge planning   ____________________________________   Max Arias M.D.   DD: 6/29/2023  3:00 PM

## 2023-06-29 NOTE — ANESTHESIA PREPROCEDURE EVALUATION
Case: [id number] Date/Time: 06/29/23 1245    Procedures:       INCISION AND DRAINAGE,ABSCESS,DEEP,LOWER LEG. FASCIOTOMY LEFT LEG.      FASCIOTOMY (Left)    Diagnosis: Compartment syndrome (HCC) [T79.A0XA]    Location: [place] [address] / SURGERY [facility]    Surgeons: Max Arias M.D.      Fascial defect, attempting closure, risk for compartment syndrome, surgeon requesting no block. Hx of CRPS.     Relevant Problems   Other   (positive) Compartment syndrome (HCC)       Physical Exam    Airway   Mallampati: II  TM distance: >3 FB  Neck ROM: full       Cardiovascular - normal exam  Rhythm: regular  Rate: normal  (-) murmur     Dental - normal exam           Pulmonary - normal exam  Breath sounds clear to auscultation     Abdominal    Neurological - normal exam                 Anesthesia Plan    ASA 2       Plan - general       Airway plan will be LMA          Induction: intravenous    Postoperative Plan: Postoperative administration of opioids is intended.    Pertinent diagnostic labs and testing reviewed    Informed Consent:    Anesthetic plan and risks discussed with patient.    Use of blood products discussed with: patient whom consented to blood products.

## 2023-06-30 ENCOUNTER — PHARMACY VISIT (OUTPATIENT)
Dept: PHARMACY | Facility: MEDICAL CENTER | Age: 48
End: 2023-06-30
Payer: COMMERCIAL

## 2023-06-30 VITALS
BODY MASS INDEX: 33.33 KG/M2 | HEART RATE: 66 BPM | OXYGEN SATURATION: 96 % | WEIGHT: 232.81 LBS | HEIGHT: 70 IN | RESPIRATION RATE: 18 BRPM | SYSTOLIC BLOOD PRESSURE: 160 MMHG | TEMPERATURE: 97.8 F | DIASTOLIC BLOOD PRESSURE: 86 MMHG

## 2023-06-30 PROBLEM — T79.A0XA COMPARTMENT SYNDROME (HCC): Status: RESOLVED | Noted: 2023-06-28 | Resolved: 2023-06-30

## 2023-06-30 PROCEDURE — 700102 HCHG RX REV CODE 250 W/ 637 OVERRIDE(OP): Performed by: ORTHOPAEDIC SURGERY

## 2023-06-30 PROCEDURE — 96376 TX/PRO/DX INJ SAME DRUG ADON: CPT

## 2023-06-30 PROCEDURE — RXMED WILLOW AMBULATORY MEDICATION CHARGE: Performed by: EMERGENCY MEDICAL TECHNICIAN, INTERMEDIATE

## 2023-06-30 PROCEDURE — A9270 NON-COVERED ITEM OR SERVICE: HCPCS | Performed by: EMERGENCY MEDICAL TECHNICIAN, INTERMEDIATE

## 2023-06-30 PROCEDURE — A9270 NON-COVERED ITEM OR SERVICE: HCPCS | Performed by: ORTHOPAEDIC SURGERY

## 2023-06-30 PROCEDURE — 700102 HCHG RX REV CODE 250 W/ 637 OVERRIDE(OP): Performed by: EMERGENCY MEDICAL TECHNICIAN, INTERMEDIATE

## 2023-06-30 PROCEDURE — G0378 HOSPITAL OBSERVATION PER HR: HCPCS

## 2023-06-30 PROCEDURE — 700111 HCHG RX REV CODE 636 W/ 250 OVERRIDE (IP): Mod: JZ | Performed by: ORTHOPAEDIC SURGERY

## 2023-06-30 RX ORDER — ACETAMINOPHEN 325 MG/1
650 TABLET ORAL EVERY 6 HOURS PRN
Status: DISCONTINUED | OUTPATIENT
Start: 2023-07-05 | End: 2023-06-30 | Stop reason: HOSPADM

## 2023-06-30 RX ORDER — ACETAMINOPHEN 325 MG/1
650 TABLET ORAL EVERY 6 HOURS
Status: DISCONTINUED | OUTPATIENT
Start: 2023-06-30 | End: 2023-06-30 | Stop reason: HOSPADM

## 2023-06-30 RX ORDER — OXYCODONE AND ACETAMINOPHEN 10; 325 MG/1; MG/1
1 TABLET ORAL EVERY 4 HOURS PRN
Qty: 12 TABLET | Refills: 0 | Status: ON HOLD | OUTPATIENT
Start: 2023-06-30 | End: 2023-07-02

## 2023-06-30 RX ORDER — OXYCODONE AND ACETAMINOPHEN 10; 325 MG/1; MG/1
1 TABLET ORAL EVERY 4 HOURS PRN
Status: DISCONTINUED | OUTPATIENT
Start: 2023-06-30 | End: 2023-06-30 | Stop reason: HOSPADM

## 2023-06-30 RX ADMIN — HYDROMORPHONE HYDROCHLORIDE 0.5 MG: 1 INJECTION, SOLUTION INTRAMUSCULAR; INTRAVENOUS; SUBCUTANEOUS at 01:35

## 2023-06-30 RX ADMIN — HYDROMORPHONE HYDROCHLORIDE 0.5 MG: 1 INJECTION, SOLUTION INTRAMUSCULAR; INTRAVENOUS; SUBCUTANEOUS at 05:26

## 2023-06-30 RX ADMIN — OXYCODONE HYDROCHLORIDE AND ACETAMINOPHEN 1 TABLET: 10; 325 TABLET ORAL at 10:55

## 2023-06-30 RX ADMIN — ACETAMINOPHEN 1000 MG: 500 TABLET, FILM COATED ORAL at 05:07

## 2023-06-30 RX ADMIN — HYDROMORPHONE HYDROCHLORIDE 0.5 MG: 1 INJECTION, SOLUTION INTRAMUSCULAR; INTRAVENOUS; SUBCUTANEOUS at 08:35

## 2023-06-30 RX ADMIN — OXYCODONE HYDROCHLORIDE 10 MG: 10 TABLET ORAL at 04:35

## 2023-06-30 RX ADMIN — OXYCODONE HYDROCHLORIDE 10 MG: 10 TABLET ORAL at 07:32

## 2023-06-30 RX ADMIN — KETOROLAC TROMETHAMINE 30 MG: 30 INJECTION, SOLUTION INTRAMUSCULAR; INTRAVENOUS at 05:07

## 2023-06-30 ASSESSMENT — PAIN DESCRIPTION - PAIN TYPE
TYPE: SURGICAL PAIN

## 2023-06-30 ASSESSMENT — PAIN SCALES - GENERAL: PAIN_LEVEL: 6

## 2023-06-30 NOTE — PROGRESS NOTES
Percocet helped with patients pain and he is agreeable to go home. Patient to transfer to d/c [facility].

## 2023-06-30 NOTE — ANESTHESIA POSTPROCEDURE EVALUATION
Patient: Jarod Fields    Procedure Summary     Date: 06/29/23 Room / Location: [place] [address] / SURGERY [facility]    Anesthesia Start: 1410 Anesthesia Stop: 1500    Procedures:       INCISION AND DRAINAGE, LOWER LEG (Leg Lower)      FASCIAL CLOSURE (Left: Leg Lower) Diagnosis:       Compartment syndrome (HCC)      (Compartment syndrome (HCC) [233499])    Surgeons: Max Arias M.D. Responsible Provider: Schuyler Whyte M.D.    Anesthesia Type: general ASA Status: 2          Final Anesthesia Type: general  Last vitals  BP   Blood Pressure: (!) 160/86 (Rn notifed)    Temp   36.6 °C (97.8 °F)    Pulse   66   Resp   18    SpO2   96 %      Anesthesia Post Evaluation    Patient location during evaluation: PACU  Patient participation: complete - patient participated  Level of consciousness: awake and alert  Pain score: 6    Airway patency: patent  Anesthetic complications: no  Cardiovascular status: hemodynamically stable  Respiratory status: acceptable  Hydration status: euvolemic    PONV: none          No notable events documented.     Nurse Pain Score: 6 (NPRS)

## 2023-06-30 NOTE — PROGRESS NOTES
2 RN Skin Assessment Completed by Ally RN and LEONARD Lai.     Head: WDL  Ears: WDL  Nose: WDL  Mouth: WDL  Neck: WDL  Breasts/Chest: WDL  Shoulder Blades: WDL  Spine: WDL  (R) Arm/Elbow/hand: WDL  (L) Arm/Elbow/hand: WDL  Abdomen:WDL  Groin: WDL  Sacrum/Coccyx/Buttocks: blanching  (R) Leg: WDL  (L) Leg:  ace wrapped  (R) Heel/Foot/Toe: blanching  (L) Heel/Foot/Toe: blanching              Devices in place: Pulse Ox     Interventions in place: Gray ear foams and Pillows     Possible skin injury found: No     Pictures uploaded into Epic: N/A  Wound Consult Placed: N/A

## 2023-06-30 NOTE — OR NURSING
Patient states he is prepared to be transferred to his room. Patient wife at beside. Surgical dressing CDI. Report provided to Dara Greene RN. Patient transported off unit with RN on 2L O2 @ 96%. All personal belongings transported with patient.

## 2023-06-30 NOTE — PROGRESS NOTES
PIV removed with tip intact and site covered with gauze and coban. Armband removed. Discussed dc instructions including medications, and follow-up appointments. Meds to beds delivered.

## 2023-06-30 NOTE — CARE PLAN
Problem: Pain - Standard  Goal: Alleviation of pain or a reduction in pain to the patient’s comfort goal  Outcome: Progressing     Problem: Knowledge Deficit - Standard  Goal: Patient and family/care givers will demonstrate understanding of plan of care, disease process/condition, diagnostic tests and medications  Outcome: Progressing   The patient is Stable - Low risk of patient condition declining or worsening    Shift Goals  Clinical Goals: pain control  Patient Goals: pain control  Family Goals: carlos    Progress made toward(s) clinical / shift goals:  a+o x 4, pain uncontrolled this am - will try percocet 10/325 now and reevaluate in a few hours - if pain is controlled on new med he is ok to be discharged home, [facility]    Patient is not progressing towards the following goals: n/a    Fall precautions/hourly rounding maintained, call light within reach and functioning, all items within reach.  Patient encouraged to call for assistance, poc reviewed with patient, ?'s/concerns answered.

## 2023-06-30 NOTE — CARE PLAN
The patient is Watcher - Medium risk of patient condition declining or worsening    Shift Goals  Clinical Goals: pain relief  Patient Goals: pain relief  Family Goals: feel better    Progress made toward(s) clinical / shift goals:      Problem: Pain - Standard  Goal: Alleviation of pain or a reduction in pain to the patient’s comfort goal  Outcome: Not Progressing     Problem: Knowledge Deficit - Standard  Goal: Patient and family/care givers will demonstrate understanding of plan of care, disease process/condition, diagnostic tests and medications  Outcome: Progressing       Patient is not progressing towards the following goals:      Problem: Pain - Standard  Goal: Alleviation of pain or a reduction in pain to the patient’s comfort goal  Outcome: Not Progressing

## 2023-06-30 NOTE — PROGRESS NOTES
Assessment completed. Pt A&Ox4. Respirations are even and unlabored on 3L n/c. Pt reports 10/10 L leg pain at this time-medication per MAR. Monitors applied, call light and belongings within reach. POC updated (pain control). Pt educated on room and call light, pt verbalized understanding. Communication board updated. Needs met. Wife at bedside.

## 2023-06-30 NOTE — DISCHARGE SUMMARY
DISCHARGE SUMMARY    PATIENTS NAME: Jarod Fields    MRN: [id number]    CSN: [id number]    ADMIT DATE:  6/29/2023    DISCHARGE DATE: 6/30/2023    ADMIT MD: Max Arias M.D.    DISCHARGE MD: Max Arias M.D.    REASON FOR ADMIT: Postop pain control    PRINCIPLE DIAGNOSIS: Exertional compartment syndrome left lower leg s/p fasciotomy    SECONDARY DIAGNOSIS: Hx CRPS    PROCEDURES: 6/29/2023  Max Arias M.D.   Repair left leg fascial defect    CONSULTATIONS: Max Arias M.D.    Patient Active Problem List    Diagnosis Date Noted    Vitamin D deficiency 12/20/2022    Pain of left upper extremity 12/14/2022    Shortness of breath 12/14/2022    Pain of left lower extremity 12/14/2022    Elevated LDL cholesterol level 12/14/2022    Intractable low back pain 08/16/2022    Intractable back pain with lumbosacral radiculopathy 08/15/2022    Elevated blood pressure reading 08/15/2022    Dysuria 05/15/2021    Reflex sympathetic dystrophy of lower limb 02/19/2015    Reflex sympathetic dystrophy of upper extremity 12/03/2014    Back pain 11/26/2014    Hyponatremia 06/22/2013    Hypokalemia 06/22/2013    Abdominal pain, other specified site 06/21/2013    Transaminitis 06/21/2013       HOSPITAL COURSE: Patient is a 49 yo male.  He was initially seen by Dr. Arias in the [facility] ER.  Dr Arias was consulted for orthopaedics.   He felt that the nature of the patients diagnosis necessitated surgical intervention.  After explaining the indications, risks, benefits, and alternatives the patient wished to proceed with surgical intervention.  The patient was taken to the OR for the above mentioned procedure.  He had no complications and minimal blood loss. He has done well with mobilization and his pain has been well controlled with oral medications. He is now ready for DC at this time.     DISCHARGE LOCATION: Home    DVT PROPHYLAXSIS:None     ANTIBIOTICS: None    WEIGHT BEARING: WBAT LLE    FOLLOW UP:  10-14 days post operatively with  Max Arias M.D.    DISCHARGE DIAGNOSIS:Exertional compartment syndrome s/p fasciotomy with repair    MEDICATIONS:   Current Outpatient Medications   Medication Sig Dispense Refill    oxyCODONE-acetaminophen (PERCOCET-10)  MG Tab Take 1 Tablet by mouth every four hours as needed for Severe Pain for up to 5 days. 12 Tablet 0    lidocaine (LIDODERM) 5 % Patch APPLY 1 PATCH BY TRANSDERMAL ROUTE ONCE DAILY (MAY WEAR UP TO 12HOURS.)      Lido-Capsaicin-Men-Methyl Sal (MEDI-PATCH-LIDOCAINE) 0.5-0.035-5-20 % Patch Apply 1 Patch topically as needed (low back pain). 30 Patch 0    ibuprofen (MOTRIN) 600 MG Tab Take 600 mg by mouth every 6 hours as needed.

## 2023-07-02 ENCOUNTER — ANESTHESIA EVENT (OUTPATIENT)
Dept: SURGERY | Facility: MEDICAL CENTER | Age: 48
End: 2023-07-02
Payer: COMMERCIAL

## 2023-07-02 ENCOUNTER — ANESTHESIA (OUTPATIENT)
Dept: SURGERY | Facility: MEDICAL CENTER | Age: 48
End: 2023-07-02
Payer: COMMERCIAL

## 2023-07-02 ENCOUNTER — HOSPITAL ENCOUNTER (EMERGENCY)
Facility: MEDICAL CENTER | Age: 48
End: 2023-07-02
Attending: EMERGENCY MEDICINE
Payer: COMMERCIAL

## 2023-07-02 ENCOUNTER — APPOINTMENT (OUTPATIENT)
Dept: RADIOLOGY | Facility: MEDICAL CENTER | Age: 48
End: 2023-07-02
Attending: EMERGENCY MEDICINE
Payer: COMMERCIAL

## 2023-07-02 VITALS
RESPIRATION RATE: 14 BRPM | WEIGHT: 232 LBS | BODY MASS INDEX: 33.21 KG/M2 | DIASTOLIC BLOOD PRESSURE: 87 MMHG | OXYGEN SATURATION: 94 % | HEART RATE: 72 BPM | SYSTOLIC BLOOD PRESSURE: 163 MMHG | HEIGHT: 70 IN | TEMPERATURE: 98.6 F

## 2023-07-02 DIAGNOSIS — M79.A22 NONTRAUMATIC COMPARTMENT SYNDROME OF LEFT LOWER EXTREMITY: ICD-10-CM

## 2023-07-02 LAB
ALBUMIN SERPL BCP-MCNC: 4.2 G/DL (ref 3.2–4.9)
ALBUMIN/GLOB SERPL: 1.4 G/DL
ALP SERPL-CCNC: 58 U/L (ref 30–99)
ALT SERPL-CCNC: 90 U/L (ref 2–50)
ANION GAP SERPL CALC-SCNC: 11 MMOL/L (ref 7–16)
AST SERPL-CCNC: 202 U/L (ref 12–45)
BASOPHILS # BLD AUTO: 0.3 % (ref 0–1.8)
BASOPHILS # BLD: 0.03 K/UL (ref 0–0.12)
BILIRUB SERPL-MCNC: 0.9 MG/DL (ref 0.1–1.5)
BUN SERPL-MCNC: 10 MG/DL (ref 8–22)
CALCIUM ALBUM COR SERPL-MCNC: 8.9 MG/DL (ref 8.5–10.5)
CALCIUM SERPL-MCNC: 9.1 MG/DL (ref 8.4–10.2)
CHLORIDE SERPL-SCNC: 103 MMOL/L (ref 96–112)
CO2 SERPL-SCNC: 24 MMOL/L (ref 20–33)
CREAT SERPL-MCNC: 0.71 MG/DL (ref 0.5–1.4)
CRP SERPL HS-MCNC: 3.33 MG/DL (ref 0–0.75)
EOSINOPHIL # BLD AUTO: 0.06 K/UL (ref 0–0.51)
EOSINOPHIL NFR BLD: 0.6 % (ref 0–6.9)
ERYTHROCYTE [DISTWIDTH] IN BLOOD BY AUTOMATED COUNT: 41.6 FL (ref 35.9–50)
GFR SERPLBLD CREATININE-BSD FMLA CKD-EPI: 114 ML/MIN/1.73 M 2
GLOBULIN SER CALC-MCNC: 2.9 G/DL (ref 1.9–3.5)
GLUCOSE SERPL-MCNC: 90 MG/DL (ref 65–99)
HCT VFR BLD AUTO: 44.9 % (ref 42–52)
HGB BLD-MCNC: 15.9 G/DL (ref 14–18)
IMM GRANULOCYTES # BLD AUTO: 0.05 K/UL (ref 0–0.11)
IMM GRANULOCYTES NFR BLD AUTO: 0.5 % (ref 0–0.9)
LACTATE SERPL-SCNC: 1 MMOL/L (ref 0.5–2)
LYMPHOCYTES # BLD AUTO: 1.26 K/UL (ref 1–4.8)
LYMPHOCYTES NFR BLD: 12.4 % (ref 22–41)
MCH RBC QN AUTO: 33.8 PG (ref 27–33)
MCHC RBC AUTO-ENTMCNC: 35.4 G/DL (ref 32.3–36.5)
MCV RBC AUTO: 95.3 FL (ref 81.4–97.8)
MONOCYTES # BLD AUTO: 1.01 K/UL (ref 0–0.85)
MONOCYTES NFR BLD AUTO: 9.9 % (ref 0–13.4)
NEUTROPHILS # BLD AUTO: 7.76 K/UL (ref 1.82–7.42)
NEUTROPHILS NFR BLD: 76.3 % (ref 44–72)
NRBC # BLD AUTO: 0 K/UL
NRBC BLD-RTO: 0 /100 WBC (ref 0–0.2)
PLATELET # BLD AUTO: 218 K/UL (ref 164–446)
PMV BLD AUTO: 9 FL (ref 9–12.9)
POTASSIUM SERPL-SCNC: 3.9 MMOL/L (ref 3.6–5.5)
PROT SERPL-MCNC: 7.1 G/DL (ref 6–8.2)
RBC # BLD AUTO: 4.71 M/UL (ref 4.7–6.1)
SODIUM SERPL-SCNC: 138 MMOL/L (ref 135–145)
WBC # BLD AUTO: 10.2 K/UL (ref 4.8–10.8)

## 2023-07-02 PROCEDURE — 160046 HCHG PACU - 1ST 60 MINS PHASE II: Performed by: ORTHOPAEDIC SURGERY

## 2023-07-02 PROCEDURE — 96376 TX/PRO/DX INJ SAME DRUG ADON: CPT

## 2023-07-02 PROCEDURE — 99140 ANES COMP EMERGENCY COND: CPT | Performed by: ANESTHESIOLOGY

## 2023-07-02 PROCEDURE — 96374 THER/PROPH/DIAG INJ IV PUSH: CPT

## 2023-07-02 PROCEDURE — 93971 EXTREMITY STUDY: CPT | Mod: LT

## 2023-07-02 PROCEDURE — 160027 HCHG SURGERY MINUTES - 1ST 30 MINS LEVEL 2: Performed by: ORTHOPAEDIC SURGERY

## 2023-07-02 PROCEDURE — 01470 ANES PX NRV MSC LW L/A/F NOS: CPT | Performed by: ANESTHESIOLOGY

## 2023-07-02 PROCEDURE — 85025 COMPLETE CBC W/AUTO DIFF WBC: CPT

## 2023-07-02 PROCEDURE — 99291 CRITICAL CARE FIRST HOUR: CPT

## 2023-07-02 PROCEDURE — 83605 ASSAY OF LACTIC ACID: CPT

## 2023-07-02 PROCEDURE — 160035 HCHG PACU - 1ST 60 MINS PHASE I: Performed by: ORTHOPAEDIC SURGERY

## 2023-07-02 PROCEDURE — 80053 COMPREHEN METABOLIC PANEL: CPT

## 2023-07-02 PROCEDURE — 700101 HCHG RX REV CODE 250

## 2023-07-02 PROCEDURE — 700101 HCHG RX REV CODE 250: Performed by: ANESTHESIOLOGY

## 2023-07-02 PROCEDURE — 160048 HCHG OR STATISTICAL LEVEL 1-5: Performed by: ORTHOPAEDIC SURGERY

## 2023-07-02 PROCEDURE — 700105 HCHG RX REV CODE 258: Mod: JZ | Performed by: ORTHOPAEDIC SURGERY

## 2023-07-02 PROCEDURE — 99024 POSTOP FOLLOW-UP VISIT: CPT | Mod: 57 | Performed by: ORTHOPAEDIC SURGERY

## 2023-07-02 PROCEDURE — 86140 C-REACTIVE PROTEIN: CPT

## 2023-07-02 PROCEDURE — 700111 HCHG RX REV CODE 636 W/ 250 OVERRIDE (IP): Mod: JZ | Performed by: EMERGENCY MEDICINE

## 2023-07-02 PROCEDURE — 96375 TX/PRO/DX INJ NEW DRUG ADDON: CPT

## 2023-07-02 PROCEDURE — 36415 COLL VENOUS BLD VENIPUNCTURE: CPT

## 2023-07-02 PROCEDURE — 700111 HCHG RX REV CODE 636 W/ 250 OVERRIDE (IP): Mod: JZ | Performed by: ANESTHESIOLOGY

## 2023-07-02 PROCEDURE — 160038 HCHG SURGERY MINUTES - EA ADDL 1 MIN LEVEL 2: Performed by: ORTHOPAEDIC SURGERY

## 2023-07-02 PROCEDURE — 160002 HCHG RECOVERY MINUTES (STAT): Performed by: ORTHOPAEDIC SURGERY

## 2023-07-02 PROCEDURE — 27600 DECOMPRESSION OF LOWER LEG: CPT | Mod: 58,LT | Performed by: ORTHOPAEDIC SURGERY

## 2023-07-02 PROCEDURE — 160025 RECOVERY II MINUTES (STATS): Performed by: ORTHOPAEDIC SURGERY

## 2023-07-02 PROCEDURE — 160009 HCHG ANES TIME/MIN: Performed by: ORTHOPAEDIC SURGERY

## 2023-07-02 PROCEDURE — 700101 HCHG RX REV CODE 250: Performed by: ORTHOPAEDIC SURGERY

## 2023-07-02 RX ORDER — SODIUM CHLORIDE, SODIUM LACTATE, POTASSIUM CHLORIDE, CALCIUM CHLORIDE 600; 310; 30; 20 MG/100ML; MG/100ML; MG/100ML; MG/100ML
INJECTION, SOLUTION INTRAVENOUS CONTINUOUS
Status: DISCONTINUED | OUTPATIENT
Start: 2023-07-02 | End: 2023-07-02 | Stop reason: HOSPADM

## 2023-07-02 RX ORDER — DIPHENHYDRAMINE HYDROCHLORIDE 50 MG/ML
12.5 INJECTION INTRAMUSCULAR; INTRAVENOUS
Status: DISCONTINUED | OUTPATIENT
Start: 2023-07-02 | End: 2023-07-02 | Stop reason: HOSPADM

## 2023-07-02 RX ORDER — ONDANSETRON 2 MG/ML
4 INJECTION INTRAMUSCULAR; INTRAVENOUS
Status: DISCONTINUED | OUTPATIENT
Start: 2023-07-02 | End: 2023-07-02 | Stop reason: HOSPADM

## 2023-07-02 RX ORDER — EPHEDRINE SULFATE 50 MG/ML
5 INJECTION, SOLUTION INTRAVENOUS
Status: DISCONTINUED | OUTPATIENT
Start: 2023-07-02 | End: 2023-07-02 | Stop reason: HOSPADM

## 2023-07-02 RX ORDER — HYDROMORPHONE HYDROCHLORIDE 1 MG/ML
1 INJECTION, SOLUTION INTRAMUSCULAR; INTRAVENOUS; SUBCUTANEOUS ONCE
Status: COMPLETED | OUTPATIENT
Start: 2023-07-02 | End: 2023-07-02

## 2023-07-02 RX ORDER — CEFAZOLIN SODIUM 1 G/3ML
INJECTION, POWDER, FOR SOLUTION INTRAMUSCULAR; INTRAVENOUS PRN
Status: DISCONTINUED | OUTPATIENT
Start: 2023-07-02 | End: 2023-07-02 | Stop reason: SURG

## 2023-07-02 RX ORDER — OXYCODONE AND ACETAMINOPHEN 10; 325 MG/1; MG/1
1 TABLET ORAL EVERY 4 HOURS PRN
Qty: 30 TABLET | Refills: 0 | Status: SHIPPED | OUTPATIENT
Start: 2023-07-02 | End: 2023-07-02 | Stop reason: SDUPTHER

## 2023-07-02 RX ORDER — HALOPERIDOL 5 MG/ML
1 INJECTION INTRAMUSCULAR
Status: DISCONTINUED | OUTPATIENT
Start: 2023-07-02 | End: 2023-07-02 | Stop reason: HOSPADM

## 2023-07-02 RX ORDER — DEXMEDETOMIDINE HYDROCHLORIDE 100 UG/ML
INJECTION, SOLUTION INTRAVENOUS PRN
Status: DISCONTINUED | OUTPATIENT
Start: 2023-07-02 | End: 2023-07-02 | Stop reason: SURG

## 2023-07-02 RX ORDER — OXYCODONE HCL 5 MG/5 ML
5 SOLUTION, ORAL ORAL
Status: DISCONTINUED | OUTPATIENT
Start: 2023-07-02 | End: 2023-07-02 | Stop reason: HOSPADM

## 2023-07-02 RX ORDER — HYDROMORPHONE HYDROCHLORIDE 1 MG/ML
0.5 INJECTION, SOLUTION INTRAMUSCULAR; INTRAVENOUS; SUBCUTANEOUS ONCE
Status: COMPLETED | OUTPATIENT
Start: 2023-07-02 | End: 2023-07-02

## 2023-07-02 RX ORDER — LORAZEPAM 2 MG/ML
0.5 INJECTION INTRAMUSCULAR ONCE
Status: COMPLETED | OUTPATIENT
Start: 2023-07-02 | End: 2023-07-02

## 2023-07-02 RX ORDER — BUPIVACAINE HYDROCHLORIDE 5 MG/ML
INJECTION, SOLUTION EPIDURAL; INTRACAUDAL
Status: DISCONTINUED | OUTPATIENT
Start: 2023-07-02 | End: 2023-07-02 | Stop reason: HOSPADM

## 2023-07-02 RX ORDER — EPHEDRINE SULFATE 50 MG/ML
INJECTION, SOLUTION INTRAVENOUS
Status: COMPLETED
Start: 2023-07-02 | End: 2023-07-02

## 2023-07-02 RX ORDER — MIDAZOLAM HYDROCHLORIDE 1 MG/ML
INJECTION INTRAMUSCULAR; INTRAVENOUS PRN
Status: DISCONTINUED | OUTPATIENT
Start: 2023-07-02 | End: 2023-07-02 | Stop reason: SURG

## 2023-07-02 RX ORDER — OXYCODONE HCL 5 MG/5 ML
10 SOLUTION, ORAL ORAL
Status: DISCONTINUED | OUTPATIENT
Start: 2023-07-02 | End: 2023-07-02 | Stop reason: HOSPADM

## 2023-07-02 RX ADMIN — LORAZEPAM 0.5 MG: 2 INJECTION INTRAMUSCULAR; INTRAVENOUS at 12:03

## 2023-07-02 RX ADMIN — MIDAZOLAM 2 MG: 1 INJECTION, SOLUTION INTRAMUSCULAR; INTRAVENOUS at 14:12

## 2023-07-02 RX ADMIN — HYDROMORPHONE HYDROCHLORIDE 1 MG: 1 INJECTION, SOLUTION INTRAMUSCULAR; INTRAVENOUS; SUBCUTANEOUS at 13:12

## 2023-07-02 RX ADMIN — FENTANYL CITRATE 100 MCG: 50 INJECTION, SOLUTION INTRAMUSCULAR; INTRAVENOUS at 14:12

## 2023-07-02 RX ADMIN — PROPOFOL 300 MG: 10 INJECTION, EMULSION INTRAVENOUS at 14:12

## 2023-07-02 RX ADMIN — SODIUM CHLORIDE, POTASSIUM CHLORIDE, SODIUM LACTATE AND CALCIUM CHLORIDE: 600; 310; 30; 20 INJECTION, SOLUTION INTRAVENOUS at 13:58

## 2023-07-02 RX ADMIN — SODIUM CHLORIDE, POTASSIUM CHLORIDE, SODIUM LACTATE AND CALCIUM CHLORIDE: 600; 310; 30; 20 INJECTION, SOLUTION INTRAVENOUS at 14:09

## 2023-07-02 RX ADMIN — EPHEDRINE SULFATE 5 MG: 50 INJECTION, SOLUTION INTRAVENOUS at 15:08

## 2023-07-02 RX ADMIN — DEXMEDETOMIDINE 20 MCG: 100 INJECTION, SOLUTION INTRAVENOUS at 14:30

## 2023-07-02 RX ADMIN — HYDROMORPHONE HYDROCHLORIDE 0.5 MG: 1 INJECTION, SOLUTION INTRAMUSCULAR; INTRAVENOUS; SUBCUTANEOUS at 12:03

## 2023-07-02 RX ADMIN — DEXMEDETOMIDINE 10 MCG: 100 INJECTION, SOLUTION INTRAVENOUS at 14:48

## 2023-07-02 RX ADMIN — HYDROMORPHONE HYDROCHLORIDE 1 MG: 1 INJECTION, SOLUTION INTRAMUSCULAR; INTRAVENOUS; SUBCUTANEOUS at 11:23

## 2023-07-02 RX ADMIN — PROPOFOL 40 MG: 10 INJECTION, EMULSION INTRAVENOUS at 14:48

## 2023-07-02 RX ADMIN — CEFAZOLIN 2 G: 1 INJECTION, POWDER, FOR SOLUTION INTRAMUSCULAR; INTRAVENOUS at 14:21

## 2023-07-02 ASSESSMENT — PAIN DESCRIPTION - PAIN TYPE
TYPE: ACUTE PAIN
TYPE: ACUTE PAIN

## 2023-07-02 ASSESSMENT — FIBROSIS 4 INDEX: FIB4 SCORE: 0.9

## 2023-07-02 NOTE — OP REPORT
OPERATIVE NOTE     DATE OF PROCEDURE: 7/2/2023            PRE-OP DIAGNOSIS: Left leg compartment syndrome            POST-OP DIAGNOSIS: same            PROCEDURE: Left leg fasciotomy of the anterior and lateral compartments            SURGEON: Yefri Murguia M.D. - Primary            ANESTHESIA: General            ESTIMATED BLOOD LOSS: 20 cc                   SPECIMENS: None            COMPLICATIONS: none            CONDITION: stable to PACU            OPERATIVE INDICATIONS AND DESCRIPTION OF PROCEDURE: Sampson is a pleasant 48-year-old gentleman who presented to the emergency department with severe left lower leg pain.  He underwent surgery 3 days ago for for anterior compartment fascial closure for a chronic fascial defect with cosmetic deformity.  He had quite a bit of pain postoperatively which worsened.  Clinically his exam was consistent with compartment syndrome of the anterior and possibly the lateral compartments in the emergency room.  We discussed treatment options.  We discussed taking him to the operating room for a fasciotomy.  We also discussed observation in the hospital to see if he improves with elevation and ice and pain control.  He opted for surgery.  We discussed that we would likely do a more thorough decompression of the anterior compartment and also potentially decompress the lateral compartment depending on intraoperative findings.  We discussed surgical technique, risks, benefits, alternatives, and expectations.  We discussed risks including, but not limited to, bleeding, infection, wound healing issues, hematoma, seroma, continued pain, need for further decompression, muscle herniation, weakness, injury to the superficial and deep peroneal nerves, numbness in the foot, foot drop and other sequelae of nerve injury, muscle loss, weakness, DVT, stroke, death, risks of anesthesia.  He elected to proceed.  I saw him in the preoperative holding area, identified him, and marked the left leg.  He  was taken back to the operating room.  General anesthesia was induced by the anesthesia provider.  Tourniquet was applied to the thigh and the upper extremity was prepped and draped in usual orthopedic fashion.  Timeout was performed.  I began by removing his previous sutures including the dermal nylon and the deep dermal Vicryl.  I then encountered the anterior compartment with the fascial closure.  There was a significant amount of muscle pooching and herniation through the sutures.  I then removed all of these large Vicryl sutures.  There is a significant release of pressure.  There was a very small amount of muscle necrosis more superficially which I sharply debrided.  I then elected to complete the fasciotomy.  I extended my exposure proximally and distally and I used a pair of scissors to release the anterior compartment anteriorly as far proximal and distal as I safely could.  I then dissected down to the lateral compartment and identified the transverse intermuscular septum and the peroneus longus muscle.  I did elect to perform a fasciotomy of this compartment as well, making sure to stay as far lateral as possible to avoid injury to the superficial peroneal nerve.  At this point, I continued by obtaining meticulous hemostasis.  The wound was thoroughly irrigated with normal saline.  I then began with closing the skin.  I used 2-0 nylon.  The skin closure was tight but I was able to close it.  I did consider placing a wound VAC but felt that the skin closure as done was reasonable.  The compartments throughout the leg were soft and compressible after the skin closure.  0.5% Duocaine was infiltrated around the incision to help with postoperative pain control.  Sterile dressings were applied.  He awoke from anesthesia and was transferred to the PACU in stable condition.

## 2023-07-02 NOTE — ED PROVIDER NOTES
ED Provider Note    Primary care provider: ALISTAIR Judd  Means of arrival: private vehicle  History obtained from: patient  History limited by: none    CHIEF COMPLAINT  Chief Complaint   Patient presents with    Leg Pain     LLE, s/p fasciotomy on Thursday, c/o increasing pain and swelling       HPI/ROS  Jarod Fields is a 48 y.o. male who presents to the Emergency Department for evaluation of left leg pain.  Patient reports that he had a recent fascial repair on 6/29/2023.  Since the procedure he has had pain in his left leg since the surgery but it got acutely worse over the last day.  He has been taking his prescribed Percocet without pain relief.  He states that he is having swelling in cannot ambulate on the left lower extremity secondary to severe pain.  Reports some slight tingling in the left ankle and foot.  Denies numbness.    EXTERNAL RECORDS REVIEWED  Patient had a recent fascial repair after having a fascial defect from a surgery 20 years ago.  Fascial repair was done by Dr. Arias on 6/29/2023    LIMITATION TO HISTORY   None    OUTSIDE HISTORIAN(S):  None      PAST MEDICAL HISTORY   has a past medical history of CRPS (complex regional pain syndrome type I) (2013) and Pain (2/18/15).    SURGICAL HISTORY   has a past surgical history that includes inject nerv blck,stellate ganglion (11/26/2014); inject nerv blck,stellate ganglion (12/3/2014); inject nerv blck,stellate ganglion (12/10/2014); percut implnt neuroelect,epidural (2/4/2015); percut implnt neuroelect,epidural (2/4/2015); spinal cord stimulator (2/19/2015); gastroscopy with biopsy (6/22/2013); other orthopedic surgery; decompress ant/lat+post leg cmpart (Left, 6/29/2023); and incision and drainage orthopedic (6/29/2023).    SOCIAL HISTORY  Social History     Tobacco Use    Smoking status: Former     Packs/day: 0.50     Types: Cigarettes    Smokeless tobacco: Never    Tobacco comments:     1/2 ppd since 13yo, on chantix  "  Vaping Use    Vaping Use: Never used   Substance Use Topics    Alcohol use: Not Currently    Drug use: Yes     Comment: CBD oil      Social History     Substance and Sexual Activity   Drug Use Yes    Comment: CBD oil       FAMILY HISTORY  Family History   Problem Relation Age of Onset    Hypertension Other     Cancer Other        CURRENT MEDICATIONS  Home Medications       Reviewed by Genoveva Benitez (Pharmacy Tech) on 07/02/23 at 1039  Med List Status: Complete     Medication Last Dose Status   ibuprofen (MOTRIN) 600 MG Tab 7/2/2023 Active   lidocaine (LIDODERM) 5 % Patch 7/1/2023 Active   oxyCODONE-acetaminophen (PERCOCET-10)  MG Tab 7/2/2023 Active                    ALLERGIES  Allergies   Allergen Reactions    Morphine      Per pt reports that he does not like the way it makes him feel    Morphine Sulfate [Morphine]      Per pt reports that he does not like the way it makes him feel       PHYSICAL EXAM  VITAL SIGNS: BP (!) 150/112   Pulse 83   Temp 37.1 °C (98.8 °F) (Temporal)   Resp 20   Ht 1.778 m (5' 10\")   Wt 105 kg (232 lb)   SpO2 95%   BMI 33.29 kg/m²   Vitals reviewed by myself.  Physical Exam  Nursing note and vitals reviewed.  Constitutional: Well-developed and well-nourished.  Patient appears uncomfortable  HENT: Head is normocephalic and atraumatic.  Eyes: extra-ocular movements intact  Cardiovascular: Regular rate and  regular rhythm.  2+ bilateral distal pedal pulses  Pulmonary/Chest: Normal respiratory effort  Musculoskeletal: Patient's surgical incision to the anterior leg is clean and dry and intact with no purulent discharge.  He has swelling and tightness around the incision, he is very tender to touch and writhing around in pain.  He has swelling extending into the ankle and any range of motion of the ankle causes severe pain  Neurological: Awake and alert, sensation intact in bilateral lower extremities  Skin: Skin is warm and dry. No rash.         DIAGNOSTIC " STUDIES:  LABS  Labs Reviewed   CBC WITH DIFFERENTIAL - Abnormal; Notable for the following components:       Result Value    MCH 33.8 (*)     Neutrophils-Polys 76.30 (*)     Lymphocytes 12.40 (*)     Neutrophils (Absolute) 7.76 (*)     Monos (Absolute) 1.01 (*)     All other components within normal limits   COMP METABOLIC PANEL - Abnormal; Notable for the following components:    AST(SGOT) 202 (*)     ALT(SGPT) 90 (*)     All other components within normal limits   CRP QUANTITIVE (NON-CARDIAC) - Abnormal; Notable for the following components:    Stat C-Reactive Protein 3.33 (*)     All other components within normal limits   LACTIC ACID   CORRECTED CALCIUM   ESTIMATED GFR       All labs reviewed and independently interpreted by myself      RADIOLOGY  Final interpretation by radiology demonstrates:    US-EXTREMITY VENOUS LOWER UNILAT LEFT   Final Result      No evidence of left lower extremity deep venous thrombosis.        The radiologist's interpretation of all radiological studies have been reviewed by me.    COURSE & MEDICAL DECISION MAKING    ED Observation Status? No; Patient does not meet criteria for ED Observation.     INITIAL ASSESSMENT AND PLAN    Patient is a 48-year-old male who comes in for evaluation of leg pain.  Differential diagnosis includes compartment syndrome, DVT, infection.  Diagnostic work-up includes labs, ultrasound.    ER COURSE AND FINAL DISPOSITION   Patient's initial vitals notable for slight hypertension likely related to pain.  He is currently neurovascularly intact on exam.  Patient is treated with Dilaudid and has ongoing pain, requires multiple doses of Dilaudid in the ER to manage pain.  Given my concern for possible compartment syndrome I did discuss the case with on-call orthopedist Dr. Valdivia who will come to evaluate patient.  Per his recommendation he recommends releasing the sutures on 1 side of the incision to see if this helps with patient's discomfort.  I remove the  skin sutures on the inferior aspect of the wound and patient did not have any relief of his pain.  Labs returned and he has no leukocytosis, CRP is slightly elevated likely secondary to recent surgery.  However clinically no evidence of overt infection.  Ultrasound returns and demonstrates no DVT.  Dr. Renee evaluated patient and elected to take him to surgery given concern for compartment syndrome.  Patient is transferred to surgery in guarded condition.    ADDITIONAL PROBLEM LIST AND RESOURCE UTILIZATION    Additional problems aside from the chief complaint that I have addressed: none    I have discussed management of the patient with the following physicians and DENNY's: Dr. Valdivia    Discussion of management with other [facility] or appropriate source(s): none     Escalation of care considered, and ultimately not performed: see above.     Barriers to care at this time, including but not limited to: none.     Decision tools and prescription drugs considered including, but not limited to: see above.    Please see review of records as noted above      FINAL IMPRESSION  1. Nontraumatic compartment syndrome of left lower extremity

## 2023-07-02 NOTE — ANESTHESIA POSTPROCEDURE EVALUATION
Patient: Jarod Fields    Procedure Summary     Date: 07/02/23 Room / Location:  OR 03 / SURGERY [facility]    Anesthesia Start: 1409 Anesthesia Stop: 1500    Procedure: FASCIOTOMY - LOWER LEG (Left: Leg Lower) Diagnosis: (left leg compartment syndrome )    Surgeons: Yefri Murguia M.D. Responsible Provider: Oscar Sibley M.D.    Anesthesia Type: general ASA Status: 2 - Emergent          Final Anesthesia Type: general  Last vitals  BP   Blood Pressure: (!) 163/87, NIBP: (!) 163/90    Temp   36.9 °C (98.4 °F)    Pulse   88   Resp   20    SpO2   94 %      Anesthesia Post Evaluation    Patient location during evaluation: PACU  Patient participation: waiting for patient participation  Level of consciousness: obtunded/minimal responses    Airway patency: patent  Anesthetic complications: no  Cardiovascular status: hemodynamically stable  Respiratory status: acceptable  Hydration status: euvolemic    PONV: none          No notable events documented.     Nurse Pain Score: 10 (NPRS)

## 2023-07-02 NOTE — ED TRIAGE NOTES
"Chief Complaint   Patient presents with    Leg Pain     LLE, s/p fasciotomy on Thursday, c/o increasing pain and swelling     BP (!) 150/112   Pulse 83   Temp 37.1 °C (98.8 °F) (Temporal)   Resp 20   Ht 1.778 m (5' 10\")   Wt 105 kg (232 lb)   SpO2 95%   BMI 33.29 kg/m²     Pt to ED via WC w/ visitor for c/o increasing pain and swelling LLE s/p surgery on Thursday.  Pt states took prescribed Percocet at 0600 this am w/o relief.    "

## 2023-07-02 NOTE — DISCHARGE INSTRUCTIONS
What to Expect Post Anesthesia    Rest and take it easy for the first 24 hours.  A responsible adult is recommended to remain with you during that time.  It is normal to feel sleepy.  We encourage you to not do anything that requires balance, judgment or coordination.    FOR 24 HOURS DO NOT:  Drive, operate machinery or run household appliances.  Drink beer or alcoholic beverages.  Make important decisions or sign legal documents.    To avoid nausea, slowly advance diet as tolerated, avoiding spicy or greasy foods for the first day.  Add more substantial food to your diet according to your provider's instructions.  Babies can be fed formula or breast milk as soon as they are hungry.  INCREASE FLUIDS AND FIBER TO AVOID CONSTIPATION.    MILD FLU-LIKE SYMPTOMS ARE NORMAL.  YOU MAY EXPERIENCE GENERALIZED MUSCLE ACHES, THROAT IRRITATION, HEADACHE AND/OR SOME NAUSEA.    Diet    Resume pre-operative diet upon discharge from the hospital. Depending on how you are feeling and whether you have nausea or not, you may wish to stay with a bland diet for the first few days. However, you can advance your diet as quickly as you feel ready.      GENERAL ORTHO DISCHARGE INSTRUCTIONS:    ACTIVITY: LEFT LEG:   NON-WEIGHT BEARING. As advised from 6/29/23 surgery.     ASSISTED DEVICES: You are being discharged with the following special equipment:  Not Applicable    WOUND CARE:  You have a surgical wound that was closed with staples or sutures. These need to be removed 10-14 days after surgery.  If you are NOT in a splint or cast, the operative dressing should be removed 2 days after surgery, and dry gauze dressing changes should be performed daily after that.  You may shower when the operative dressing is removed.    ICE: Apply ice packs to the affected area (15 minutes on the knee, 15 minutes off the knee) for the first week, as you feel necessary to help with the pain and swelling.    ELEVATION: Keep your extremity elevated as much  as possible, above your heart, to help control pain and swelling for at least 2 weeks. If the sensation in your toes of your operative extremity changes, or the toes change colors, or should your pain become too difficult to control, you should immediately elevate your operative extremity.  If these symptoms do not resolve after 30 minutes to 1 hour, you should seek medical care immediately.

## 2023-07-02 NOTE — ANESTHESIA PROCEDURE NOTES
Airway    Date/Time: 7/2/2023 2:14 PM    Performed by: Oscar Sibley M.D.  Authorized by: Oscar Sibley M.D.    Location:  OR  Urgency:  Elective  Indications for Airway Management:  Anesthesia      Spontaneous Ventilation: absent    Sedation Level:  Deep  Preoxygenated: Yes    Final Airway Type:  Supraglottic airway  Final Supraglottic Airway:  Standard LMA    SGA Size:  4  Number of Attempts at Approach:  1

## 2023-07-02 NOTE — OR NURSING
1456- Patient arrived from OR post FASCIOTOMY - LOWER LEG - Left. Respiratory spontaneous and unlabored via OPA. Hypotension noted. Dr. Sibley aware. New orders received. Surgical dressing to LLE C/D/I pulses 1+ bilaterally. Toes warm and pink with brisk cap refill.   1508- Minimal improvement in blood pressure. Ephedrine administered, see MAR.   1524- Patient waking up. Denies any pain or nausea, quickly drifts back to sleep.   1546- PO fluids provided. Continues to deny any pain or nausea. Reports grogginess. Transferred to Stage 2.   1603- Discharge instructions provided to patient and wife. Both verbalized understanding. All questions and concerns addressed.   1604- Patient continues to rest. Warm blankets provided.   1610- Patient verbalizes readiness to get dressed and discharge.   1618- Dressed with SBA assistance and mobilized to chair.   1628- Patient continues to deny any pain or nausea. VSS, see flowsheets. Wife and patient deny any additional questions. Patient D/Silvano to care of family following an uneventful stay in Stage 2.

## 2023-07-02 NOTE — ANESTHESIA PREPROCEDURE EVALUATION
Case: [id number] Date/Time: 07/02/23 1345    Procedure: FASCIOTOMY - LOWER LEG (Left: Leg Lower)    Anesthesia type: General    Location:  OR  / SURGERY [facility]    Surgeons: Yefri Murguia M.D.          Relevant Problems   PULMONARY   (positive) Shortness of breath       Physical Exam    Airway   Mallampati: II  TM distance: >3 FB  Neck ROM: full       Cardiovascular - normal exam  Rhythm: regular  Rate: normal  (-) murmur     Dental - normal exam           Pulmonary - normal exam  Breath sounds clear to auscultation     Abdominal    Neurological - normal exam                 Anesthesia Plan    ASA 2- EMERGENT   ASA physical status emergent criteria: compromised vital organ, limb or tissue    Plan - general       Airway plan will be LMA        Plan Factors:   Patient was previously instructed to abstain from smoking on day of procedure.  Patient did not smoke on day of procedure.      Induction: intravenous          Informed Consent:    Anesthetic plan and risks discussed with patient.    Use of blood products discussed with: patient whom.

## 2023-07-02 NOTE — CONSULTS
"Case Summary:  48 y.o. male presenting with a chief complaint of severe left lower leg pain.  He had surgery 3 days ago for closure of a left anterior compartment fascial defect with Dr. Arias.  States that he has been having a lot of pain since the surgery and that is worsening.  Presented to the ER.  Difficult to control pain.  Having some tingling in the foot.  Difficulty ambulating and difficulty moving the ankle.      Ortho Exam / Vitals / Weight:  Body mass index is 33.29 kg/m².  Height: 177.8 cm (5' 10\") Weight: 105 kg (232 lb)  BP (!) 163/87   Pulse 80   Temp 37.1 °C (98.8 °F) (Temporal)   Resp 20   Ht 1.778 m (5' 10\")   Wt 105 kg (232 lb)   SpO2 93%   BMI 33.29 kg/m²       Orthopedic Physical Exam:  GEN: NAD, resting comfortably, nonlabored breathing on RA, pleasant and conversational      CARLY:  - skin intact, atraumatic, otherwise distally neurovascularly intact  - fires tibial and peroneal nerves appropriately  - compartments soft and compressible  - SILT tibial/peroneal/sural/saphenous nerve distribution  - digits warm and well perfused, excellent capillary refill      UE:  - skin intact, atraumatic, otherwise distally neurovascularly intact  - fires AIN/PIN/ulnar nerve appropriately  - compartments soft and compressible  - SILT median/radial/ulnar nerve distribution  - digits warm and well perfused, excellent capillary refill      Lab:    Recent Labs     07/02/23  1120   WBC 10.2   RBC 4.71   HEMOGLOBIN 15.9   HEMATOCRIT 44.9   MCV 95.3   MCH 33.8*   MCHC 35.4   RDW 41.6   PLATELETCT 218   MPV 9.0     Recent Labs     07/02/23  1120   SODIUM 138   POTASSIUM 3.9   CHLORIDE 103   CO2 24   GLUCOSE 90   BUN 10             Past Medical / Past Surgical:  Past Medical History:   Diagnosis Date    CRPS (complex regional pain syndrome type I) 2013    Pain 2/18/15    right arm      Past Surgical History:   Procedure Laterality Date    PB DECOMPBESS ANT/LAT+POST LEG CMPART Left 6/29/2023    Procedure: " FASCIAL CLOSURE;  Surgeon: Max Arias M.D.;  Location: SURGERY [facility];  Service: Orthopedics    INCISION AND DRAINAGE ORTHOPEDIC  6/29/2023    Procedure: INCISION AND DRAINAGE, LOWER LEG;  Surgeon: Max Arias M.D.;  Location: SURGERY [facility];  Service: Orthopedics    SPINAL CORD STIMULATOR  2/19/2015    Performed by Ghassan Coy M.D. at SURGERY [facility]    IN PERCUT IMPLNT NEUROELECT,EPIDURAL  2/4/2015    Performed by Ghassan Coy M.D. at [facility] ORS    IN PERCUT IMPLNT NEUROELECT,EPIDURAL  2/4/2015    Performed by Ghassan Coy M.D. at [facility] ORS    PB INJECT NERV BLCK,STELLATE GANGLION  12/10/2014    Performed by Ghassan Coy M.D. at [facility] ORS    PB INJECT NERV BLCK,STELLATE GANGLION  12/3/2014    Performed by Ghassan Coy M.D. at [facility] ORS    PB INJECT NERV BLCK,STELLATE GANGLION  11/26/2014    Performed by Ghassan Coy M.D. at [facility] ORS    GASTROSCOPY WITH BIOPSY  6/22/2013    Performed by Robbie Nesbitt M.D. at ENDOSCOPY [facility] ORS    OTHER ORTHOPEDIC SURGERY      L tib/fib w/hardware-removed       Home Medications:  @[facility]@     Allergies:    Morphine and Morphine sulfate [morphine]    Social / Family Histories:    Social History     Tobacco Use    Smoking status: Former     Packs/day: 0.50     Types: Cigarettes    Smokeless tobacco: Never    Tobacco comments:     1/2 ppd since 15yo, on chantix   Vaping Use    Vaping Use: Never used   Substance Use Topics    Alcohol use: Not Currently     [id number]  Social History     Social History Narrative    ** Merged History Encounter **           Family History   Problem Relation Age of Onset    Hypertension Other     Cancer Other        Review of Systems:    Per HPI. The remainder of the review of systems is negative/non-contributory.       Imaging / Other Studies:    Left leg shows a 25 cm longitudinal incision anteriorly.   Sutures in place, no signs of infection.  Quite tense anterior compartment, quite full in the lateral compartment as well.  Tender to palpation.  Significant pain with ankle motion.  He can wiggle all of his toes, he does have sensation intact light touch throughout the foot.  Brisk cap refill to all toes.      Assessment and Recommendations:  48 y.o. male with left leg compartment syndrome after fascial closure 3 days ago.  Discussed options with the patient.  We discussed that we certainly could admit him for observation and elevation and pain control.  We also discussed reopening the fascial defect as he clinically is pretty consistent with compartment syndrome.  We discussed risks and benefits.  He opted for surgery.  We discussed fasciotomy of the anterior and possibly the lateral compartments depending on intraoperative findings.  He elected to proceed.  We discussed risks, benefits, turnips, and expectations.

## 2023-07-02 NOTE — ANESTHESIA TIME REPORT
Anesthesia Start and Stop Event Times     Date Time Event    7/2/2023 1400 Ready for Procedure     1409 Anesthesia Start     1500 Anesthesia Stop        Responsible Staff  07/02/23    Name Role Begin End    Oscar Sibley M.D. Anesth 1409 1500        Overtime Reason:  no overtime (within assigned shift)    Comments:

## 2023-07-02 NOTE — OR NURSING
1400: Report from Nicolette VALLE. Confirmed name, , and surgery to be performed. Verified allergies, medications, and preferred pharmacy. Patient is A&OX4. IV patency confirmed.

## 2023-07-04 ENCOUNTER — HOSPITAL ENCOUNTER (EMERGENCY)
Facility: MEDICAL CENTER | Age: 48
End: 2023-07-05
Attending: EMERGENCY MEDICINE
Payer: COMMERCIAL

## 2023-07-04 DIAGNOSIS — G89.18 POST-OP PAIN: ICD-10-CM

## 2023-07-04 LAB
ALBUMIN SERPL BCP-MCNC: 3.9 G/DL (ref 3.2–4.9)
ALBUMIN/GLOB SERPL: 1.2 G/DL
ALP SERPL-CCNC: 56 U/L (ref 30–99)
ALT SERPL-CCNC: 62 U/L (ref 2–50)
ANION GAP SERPL CALC-SCNC: 12 MMOL/L (ref 7–16)
AST SERPL-CCNC: 96 U/L (ref 12–45)
BASOPHILS # BLD AUTO: 0.3 % (ref 0–1.8)
BASOPHILS # BLD: 0.02 K/UL (ref 0–0.12)
BILIRUB SERPL-MCNC: 0.5 MG/DL (ref 0.1–1.5)
BUN SERPL-MCNC: 14 MG/DL (ref 8–22)
CALCIUM ALBUM COR SERPL-MCNC: 9.1 MG/DL (ref 8.5–10.5)
CALCIUM SERPL-MCNC: 9 MG/DL (ref 8.4–10.2)
CHLORIDE SERPL-SCNC: 104 MMOL/L (ref 96–112)
CK SERPL-CCNC: 3066 U/L (ref 0–154)
CO2 SERPL-SCNC: 23 MMOL/L (ref 20–33)
CREAT SERPL-MCNC: 0.91 MG/DL (ref 0.5–1.4)
CRP SERPL HS-MCNC: 11.1 MG/DL (ref 0–0.75)
EOSINOPHIL # BLD AUTO: 0.12 K/UL (ref 0–0.51)
EOSINOPHIL NFR BLD: 1.7 % (ref 0–6.9)
ERYTHROCYTE [DISTWIDTH] IN BLOOD BY AUTOMATED COUNT: 42.6 FL (ref 35.9–50)
ERYTHROCYTE [SEDIMENTATION RATE] IN BLOOD BY WESTERGREN METHOD: 38 MM/HOUR (ref 0–20)
GFR SERPLBLD CREATININE-BSD FMLA CKD-EPI: 104 ML/MIN/1.73 M 2
GLOBULIN SER CALC-MCNC: 3.2 G/DL (ref 1.9–3.5)
GLUCOSE SERPL-MCNC: 89 MG/DL (ref 65–99)
HCT VFR BLD AUTO: 42.3 % (ref 42–52)
HGB BLD-MCNC: 14.7 G/DL (ref 14–18)
IMM GRANULOCYTES # BLD AUTO: 0.04 K/UL (ref 0–0.11)
IMM GRANULOCYTES NFR BLD AUTO: 0.6 % (ref 0–0.9)
LYMPHOCYTES # BLD AUTO: 1.59 K/UL (ref 1–4.8)
LYMPHOCYTES NFR BLD: 22 % (ref 22–41)
MCH RBC QN AUTO: 33.7 PG (ref 27–33)
MCHC RBC AUTO-ENTMCNC: 34.8 G/DL (ref 32.3–36.5)
MCV RBC AUTO: 97 FL (ref 81.4–97.8)
MONOCYTES # BLD AUTO: 0.6 K/UL (ref 0–0.85)
MONOCYTES NFR BLD AUTO: 8.3 % (ref 0–13.4)
NEUTROPHILS # BLD AUTO: 4.87 K/UL (ref 1.82–7.42)
NEUTROPHILS NFR BLD: 67.1 % (ref 44–72)
NRBC # BLD AUTO: 0 K/UL
NRBC BLD-RTO: 0 /100 WBC (ref 0–0.2)
PLATELET # BLD AUTO: 251 K/UL (ref 164–446)
PMV BLD AUTO: 9.2 FL (ref 9–12.9)
POTASSIUM SERPL-SCNC: 3.7 MMOL/L (ref 3.6–5.5)
PROT SERPL-MCNC: 7.1 G/DL (ref 6–8.2)
RBC # BLD AUTO: 4.36 M/UL (ref 4.7–6.1)
SODIUM SERPL-SCNC: 139 MMOL/L (ref 135–145)
WBC # BLD AUTO: 7.2 K/UL (ref 4.8–10.8)

## 2023-07-04 PROCEDURE — 700102 HCHG RX REV CODE 250 W/ 637 OVERRIDE(OP): Performed by: EMERGENCY MEDICINE

## 2023-07-04 PROCEDURE — 82550 ASSAY OF CK (CPK): CPT

## 2023-07-04 PROCEDURE — 36415 COLL VENOUS BLD VENIPUNCTURE: CPT

## 2023-07-04 PROCEDURE — 99284 EMERGENCY DEPT VISIT MOD MDM: CPT

## 2023-07-04 PROCEDURE — 85652 RBC SED RATE AUTOMATED: CPT

## 2023-07-04 PROCEDURE — 86140 C-REACTIVE PROTEIN: CPT

## 2023-07-04 PROCEDURE — A9270 NON-COVERED ITEM OR SERVICE: HCPCS | Performed by: EMERGENCY MEDICINE

## 2023-07-04 PROCEDURE — 85025 COMPLETE CBC W/AUTO DIFF WBC: CPT

## 2023-07-04 PROCEDURE — 80053 COMPREHEN METABOLIC PANEL: CPT

## 2023-07-04 RX ORDER — OXYCODONE HYDROCHLORIDE AND ACETAMINOPHEN 5; 325 MG/1; MG/1
1 TABLET ORAL ONCE
Status: COMPLETED | OUTPATIENT
Start: 2023-07-04 | End: 2023-07-04

## 2023-07-04 RX ADMIN — OXYCODONE AND ACETAMINOPHEN 1 TABLET: 5; 325 TABLET ORAL at 23:45

## 2023-07-04 ASSESSMENT — LIFESTYLE VARIABLES
TOTAL SCORE: 0
TOTAL SCORE: 0
EVER FELT BAD OR GUILTY ABOUT YOUR DRINKING: NO
DO YOU DRINK ALCOHOL: NO
HOW MANY TIMES IN THE PAST YEAR HAVE YOU HAD 5 OR MORE DRINKS IN A DAY: 0
EVER HAD A DRINK FIRST THING IN THE MORNING TO STEADY YOUR NERVES TO GET RID OF A HANGOVER: NO
TOTAL SCORE: 0
HAVE PEOPLE ANNOYED YOU BY CRITICIZING YOUR DRINKING: NO
CONSUMPTION TOTAL: NEGATIVE
HAVE YOU EVER FELT YOU SHOULD CUT DOWN ON YOUR DRINKING: NO
AVERAGE NUMBER OF DAYS PER WEEK YOU HAVE A DRINK CONTAINING ALCOHOL: 0
ON A TYPICAL DAY WHEN YOU DRINK ALCOHOL HOW MANY DRINKS DO YOU HAVE: 0

## 2023-07-04 ASSESSMENT — FIBROSIS 4 INDEX: FIB4 SCORE: 4.59

## 2023-07-05 VITALS
HEART RATE: 72 BPM | DIASTOLIC BLOOD PRESSURE: 88 MMHG | BODY MASS INDEX: 34.23 KG/M2 | WEIGHT: 238.54 LBS | SYSTOLIC BLOOD PRESSURE: 158 MMHG | OXYGEN SATURATION: 97 % | TEMPERATURE: 97.8 F | RESPIRATION RATE: 16 BRPM

## 2023-07-05 NOTE — ED NOTES
Pt updated on POC , that we are still waiting for some blood work to come back. Pt verbalized understanding

## 2023-07-05 NOTE — DISCHARGE INSTRUCTIONS
Please use the walking boot.  Please call [facility] tomorrow morning, they will be able to see you in clinic at that time.  Return to the emergency department if you develop any new or worsening symptoms including worsening pain, swelling, numbness or tingling, if you have any worsening pulses, or if you have any further concerns.    Please let them know you are seen in the emergency department at [facility], the emergency physician spoke with Dr. Akers, and he would like you to be seen in clinic today, 7/5/2023.

## 2023-07-05 NOTE — ED PROVIDER NOTES
Emergency Physician Note    Chief Concern:  Chief Complaint   Patient presents with    Post-Op Complications     Pt was seen Sunday for surgery to relieve pressure related to compartment syndrome. Pt back today because leg is still rock hard, foot is swollen and sutures are still bleeding.          Limitation to History:  None    HPI/ROS   Outside Historians:   Family member     External Records Reviewed:  Mr. Fields was seen at this facility 7/2/2023 and diagnosed with compartment syndrome of the left leg.  Operative report reviewed from 7/2/2023.  He underwent left leg fasciotomy of the anterior and lateral compartments performed by Dr. Murguia, orthopedic surgeon.  Noted that he had undergone surgery 3 days prior for anterior compartment fascial closure for chronic fascial defect with cosmetic deformity.  Skin closure was noted to be tight, but skin was able to be closed.    HPI:  Jarod Fields is a 48 y.o. male who presents to the emergency department for evaluation of swelling to his left lower leg.  He is postop day 5 status post fascial repair performed by Dr. Arias, and postop day 2 status post fasciotomy for treatment of postoperative compartment syndrome.  Since discharge 2 days ago he has not had any significant pain, however he reports progressively worsening edema to the left lower extremity especially in the area of the incision site.  He is also had progressively worsening serosanguineous drainage from the area.  He has been checking to make sure he has a good pulse in the left lower extremity, states he has not had any decreased pulses, no pain, no discoloration.  Reports no other significant past medical history, no history of impaired immunity, no history of diabetes.    PAST MEDICAL HISTORY  Past Medical History:   Diagnosis Date    CRPS (complex regional pain syndrome type I) 2013    Pain 2/18/15    right arm       SURGICAL HISTORY  Past Surgical History:   Procedure Laterality Date     FASCIOTOMY Left 7/2/2023    Procedure: FASCIOTOMY - LOWER LEG;  Surgeon: Yefri Murguia M.D.;  Location: SURGERY [facility];  Service: Orthopedics    PB DECOMPBESS ANT/LAT+POST LEG CMPART Left 6/29/2023    Procedure: FASCIAL CLOSURE;  Surgeon: Max Arias M.D.;  Location: SURGERY [facility];  Service: Orthopedics    INCISION AND DRAINAGE ORTHOPEDIC  6/29/2023    Procedure: INCISION AND DRAINAGE, LOWER LEG;  Surgeon: Max Arias M.D.;  Location: SURGERY [facility];  Service: Orthopedics    SPINAL CORD STIMULATOR  2/19/2015    Performed by Ghassan Coy M.D. at SURGERY [facility]    MD PERCUT IMPLNT NEUROELECT,EPIDURAL  2/4/2015    Performed by Ghassan Coy M.D. at [facility]    MD PERCUT IMPLNT NEUROELECT,EPIDURAL  2/4/2015    Performed by Ghassan Coy M.D. at [facility]    PB INJECT NERV BLCK,STELLATE GANGLION  12/10/2014    Performed by Ghassan Coy M.D. at [facility]    PB INJECT NERV BLCK,STELLATE GANGLION  12/3/2014    Performed by Ghassan Coy M.D. at [facility]    PB INJECT NERV BLCK,STELLATE GANGLION  11/26/2014    Performed by Ghassan Coy M.D. at [facility]    GASTROSCOPY WITH BIOPSY  6/22/2013    Performed by Robbie Nesbitt M.D. at ENDOSCOPY [facility] ORS    OTHER ORTHOPEDIC SURGERY      L tib/fib w/hardware-removed       FAMILY HISTORY  Family History   Problem Relation Age of Onset    Hypertension Other     Cancer Other        SOCIAL HISTORY   reports that he has quit smoking. His smoking use included cigarettes. He smoked an average of .5 packs per day. He has never used smokeless tobacco. He reports that he does not currently use alcohol. He reports current drug use.    CURRENT MEDICATIONS  Discharge Medication List as of 7/5/2023 12:13 AM        CONTINUE these medications which have NOT CHANGED    Details   oxyCODONE-acetaminophen (PERCOCET-10)  MG Tab Take 1 Tablet by mouth  every four hours as needed for Severe Pain for up to 4 days., Disp-20 Tablet, R-0, Normal      lidocaine (LIDODERM) 5 % Patch Place 1 Patch on the skin every 24 hours. Apply's on left ankle, Historical Med      ibuprofen (MOTRIN) 600 MG Tab Take 600 mg by mouth every 6 hours as needed for Moderate Pain. Indications: Pain, Historical Med             ALLERGIES  Patient has no known allergies.    PHYSICAL EXAM  Vital Signs: BP (!) 158/88   Pulse 72   Temp 36.6 °C (97.8 °F) (Temporal)   Resp 16   Wt 108 kg (238 lb 8.6 oz)   SpO2 97%   BMI 34.23 kg/m²   Constitutional: Alert, no acute distress  HENT: Normocephalic, atraumatic.  Cardiovascular: No tachycardia  Pulmonary: No respiratory distress, normal work of breathing  Skin: As documented musculoskeletal exam  Musculoskeletal: Left lower extremity with significant soft tissue edema surrounding the incision site, incision is intact, sutures are intact, there is a small amount of serosanguineous drainage seeping from the wound.  2+ DP pulse to the left lower extremity, distal extremity is warm and well-perfused, he does have decreased sensation to the area of swelling immediately surrounding the incision site.  Neurologic: Abnormalities as documented in musculoskeletal exam    Diagnostic Studies & Procedures    Labs:  All labs reviewed by me as noted below.      Course and Medical Decision Making    ED Observation Status? No; Patient does not meet criteria for ED Observation.     Initial Assessment and Plan  Mr. Fields presents to the emergency department today for evaluation of worsening swelling surrounding the incision site of a recent fasciotomy.  He is not having any worsening pain, however he is concerned because he does not have much sensation to the area at all, when the fasciotomy was initially performed he was told by Dr. Murguia that the decrease sensation was normal, and should begin to return.  However he is concerned about the worsening swelling and  worsening serosanguineous drainage from the area.    On laboratory evaluation is a normal white blood count, normal hemoglobin, no evidence of acute blood loss.  CMP is reassuring with normal potassium.  Liver enzymes are just above normal reference range.  CRP is elevated to 11.  I was notified by the lab about a critical CPK, which is notably elevated to 3066, no prior values available for comparison.    I discussed his presentation with Dr. Akers, orthopedic surgeon on-call.  Reviewed CPK, he states that this value is not unremarkable given recent fasciotomy.  Compartments have been released, states there is no concern for ongoing compartment syndrome.  Suspect this is likely postop swelling, recommends that the patient is followed closely on an outpatient basis.  Kindly agrees to have the patient scheduled for an outpatient follow-up visit at [facility] later this morning.    Mr. Fields will call at the opening of business hours to schedule a follow-up appointment, let them know that he is to be seen in clinic this morning.  Dr. Akers also recommended a walking boot which was provided. Return precautions were discussed with the patient, and provided in written form with the patient's discharge instructions.       Additional Problems and Disposition    I have discussed management of the patient with the following physician:  Dr. Akers, Orthopedic Surgeon    Escalation of care considered, and ultimately not performed: Hospitalization was initially considered, however after discussing abnormal labs with orthopedic specialist, abnormalities appear to be as expected after fasciotomy, no indication for admission, patient can be closely followed up on an outpatient basis within 24 hours.    Disposition:  Discharged home in stable condition    FINAL IMPRESSION   1. Post-op pain        The note accurately reflects work and decisions made by me.  Altagracia Graham M.D.  7/7/2023  6:36 AM

## 2023-07-05 NOTE — ED NOTES
Pt used crutches on his own back to ED 8B. Pt changed into gown and placed on monitor. Pt is updated on POC. Joaquimrney in the lowest position, side rails are up and call light within reach. Pt educated to let RN know if condition gets worst or if the pt needs anything.

## 2023-07-05 NOTE — H&P
Surgery Orthopedic History & Physical Note    Date  6/29/2023    Primary Care Physician  ALISTAIR Judd      Pre-Op Diagnosis Codes:     * Compartment syndrome (HCC) [T79.A0XA]    HPI  This is a 48 y.o. male who presented with fascial defect to left leg    Past Medical History:   Diagnosis Date    CRPS (complex regional pain syndrome type I) 2013    Pain 2/18/15    right arm       Past Surgical History:   Procedure Laterality Date    FASCIOTOMY Left 7/2/2023    Procedure: FASCIOTOMY - LOWER LEG;  Surgeon: Yefri Murguia M.D.;  Location: SURGERY [facility];  Service: Orthopedics    PB DECOMPBESS ANT/LAT+POST LEG CMPART Left 6/29/2023    Procedure: FASCIAL CLOSURE;  Surgeon: Max Arias M.D.;  Location: SURGERY [facility];  Service: Orthopedics    INCISION AND DRAINAGE ORTHOPEDIC  6/29/2023    Procedure: INCISION AND DRAINAGE, LOWER LEG;  Surgeon: Max Arias M.D.;  Location: SURGERY [facility];  Service: Orthopedics    SPINAL CORD STIMULATOR  2/19/2015    Performed by Ghassan Coy M.D. at SURGERY [facility]    WA PERCUT IMPLNT NEUROELECT,EPIDURAL  2/4/2015    Performed by Ghassan Coy M.D. at [facility]    WA PERCUT IMPLNT NEUROELECT,EPIDURAL  2/4/2015    Performed by Ghassan Coy M.D. at [facility]    PB INJECT NERV BLCK,STELLATE GANGLION  12/10/2014    Performed by Ghassan Coy M.D. at [facility] ORS    PB INJECT NERV BLCK,STELLATE GANGLION  12/3/2014    Performed by Ghassan Coy M.D. at [facility]    PB INJECT NERV BLCK,STELLATE GANGLION  11/26/2014    Performed by Ghassan Coy M.D. at [facility]    GASTROSCOPY WITH BIOPSY  6/22/2013    Performed by Robbie Nesbitt M.D. at ENDOSCOPY [facility] ORS    OTHER ORTHOPEDIC SURGERY      L tib/fib w/hardware-removed       No current facility-administered medications for this encounter.     Current Outpatient Medications   Medication Sig Dispense  Refill    oxyCODONE-acetaminophen (PERCOCET-10)  MG Tab Take 1 Tablet by mouth every four hours as needed for Severe Pain for up to 4 days. 20 Tablet 0    lidocaine (LIDODERM) 5 % Patch Place 1 Patch on the skin every 24 hours. Apply's on left ankle      ibuprofen (MOTRIN) 600 MG Tab Take 600 mg by mouth every 6 hours as needed for Moderate Pain. Indications: Pain         Social History     Socioeconomic History    Marital status:      Spouse name: Not on file    Number of children: Not on file    Years of education: Not on file    Highest education level: GED or equivalent   Occupational History    Not on file   Tobacco Use    Smoking status: Former     Packs/day: 0.50     Types: Cigarettes    Smokeless tobacco: Never    Tobacco comments:     1/2 ppd since 15yo, on chantix   Vaping Use    Vaping Use: Never used   Substance and Sexual Activity    Alcohol use: Not Currently    Drug use: Yes     Comment: CBD oil    Sexual activity: Yes     Partners: Female   Other Topics Concern    Not on file   Social History Narrative    ** Merged History Encounter **          Social Determinants of Health     Financial Resource Strain: Low Risk  (8/15/2022)    Overall Financial Resource Strain (CARDIA)     Difficulty of Paying Living Expenses: Not very hard   Food Insecurity: No Food Insecurity (8/15/2022)    Hunger Vital Sign     Worried About Running Out of Food in the Last Year: Never true     Ran Out of Food in the Last Year: Never true   Transportation Needs: No Transportation Needs (8/15/2022)    PRAPARE - Transportation     Lack of Transportation (Medical): No     Lack of Transportation (Non-Medical): No   Physical Activity: Sufficiently Active (8/15/2022)    Exercise Vital Sign     Days of Exercise per Week: 3 days     Minutes of Exercise per Session: 60 min   Stress: No Stress Concern Present (8/15/2022)    [place] [employer] - Occupational Stress Questionnaire     Feeling of Stress :  Only a little   Social Connections: Moderately Integrated (8/15/2022)    Social Connection and Isolation Panel [NHANES]     Frequency of Communication with Friends and Family: More than three times a week     Frequency of Social Gatherings with Friends and Family: Twice a week     Attends Christian Services: More than 4 times per year     Active Member of Clubs or Organizations: No     Attends Club or Organization Meetings: Never     Marital Status:    Intimate Partner Violence: Not on file   Housing Stability: Low Risk  (8/15/2022)    Housing Stability Vital Sign     Unable to Pay for Housing in the Last Year: No     Number of Places Lived in the Last Year: 1     Unstable Housing in the Last Year: No       Family History   Problem Relation Age of Onset    Hypertension Other     Cancer Other        Allergies  Patient has no known allergies.    Review of Systems  Negative except for left leg pain over anterior compartment    Physical Exam  Left leg fascial defect over tibialis anterior  Vital Signs  Blood Pressure: (!) 160/86 (Rn notifed)   Temperature: 36.6 °C (97.8 °F)   Pulse: 66   Respiration: 18   Pulse Oximetry: 96 %       Labs:  Recent Labs     07/02/23 1120 07/04/23 2200   WBC 10.2 7.2   RBC 4.71 4.36*   HEMOGLOBIN 15.9 14.7   HEMATOCRIT 44.9 42.3   MCV 95.3 97.0   MCH 33.8* 33.7*   MCHC 35.4 34.8   RDW 41.6 42.6   PLATELETCT 218 251   MPV 9.0 9.2     Recent Labs     07/02/23 1120 07/04/23  2200   SODIUM 138 139   POTASSIUM 3.9 3.7   CHLORIDE 103 104   CO2 24 23   GLUCOSE 90 89   BUN 10 14   CREATININE 0.71 0.91   CALCIUM 9.1 9.0         Recent Labs     07/02/23 1120 07/04/23  2200   ASTSGOT 202* 96*   ALTSGPT 90* 62*   TBILIRUBIN 0.9 0.5   ALKPHOSPHAT 58 56   GLOBULIN 2.9 3.2       Radiology:  No orders to display         Assessment/Plan:  Left leg fascial defect with exercise induced pain. We discussed closure vs full compartment release. He wished closure and understands there is risk of  compartment syndrome recurrence, re-rupture, weakness, infection, neurovascular injury and chronic pain. He understands and wished to proceed

## 2023-07-05 NOTE — ED NOTES
Pt hit call light and stated that he foot is starting to throb, ERP aware and orders received. Pt medicated per MAR. Pt also provided pillows and ice bag.

## 2023-07-05 NOTE — ED TRIAGE NOTES
Bib dad for above complaints.     Chief Complaint   Patient presents with    Post-Op Complications     Pt was seen Sunday for surgery to relieve pressure related to compartment syndrome. Pt back today because leg is still rock hard, foot is swollen and sutures are still bleeding.      BP (!) 163/121   Pulse 75   Temp 36.5 °C (97.7 °F) (Temporal)   Resp 18   Wt 108 kg (238 lb 8.6 oz)   SpO2 96%   BMI 34.23 kg/m²

## 2023-08-14 ENCOUNTER — HOSPITAL ENCOUNTER (EMERGENCY)
Facility: MEDICAL CENTER | Age: 48
End: 2023-08-14
Attending: STUDENT IN AN ORGANIZED HEALTH CARE EDUCATION/TRAINING PROGRAM
Payer: COMMERCIAL

## 2023-08-14 VITALS
DIASTOLIC BLOOD PRESSURE: 98 MMHG | WEIGHT: 237.44 LBS | SYSTOLIC BLOOD PRESSURE: 137 MMHG | TEMPERATURE: 98.4 F | HEART RATE: 81 BPM | HEIGHT: 70 IN | BODY MASS INDEX: 33.99 KG/M2 | OXYGEN SATURATION: 94 % | RESPIRATION RATE: 18 BRPM

## 2023-08-14 DIAGNOSIS — L24.A9 WOUND DRAINAGE: ICD-10-CM

## 2023-08-14 PROCEDURE — A9270 NON-COVERED ITEM OR SERVICE: HCPCS | Performed by: STUDENT IN AN ORGANIZED HEALTH CARE EDUCATION/TRAINING PROGRAM

## 2023-08-14 PROCEDURE — 700102 HCHG RX REV CODE 250 W/ 637 OVERRIDE(OP): Performed by: STUDENT IN AN ORGANIZED HEALTH CARE EDUCATION/TRAINING PROGRAM

## 2023-08-14 PROCEDURE — 99283 EMERGENCY DEPT VISIT LOW MDM: CPT

## 2023-08-14 RX ORDER — GABAPENTIN 400 MG/1
400 CAPSULE ORAL ONCE
Status: COMPLETED | OUTPATIENT
Start: 2023-08-14 | End: 2023-08-14

## 2023-08-14 RX ORDER — OXYCODONE HYDROCHLORIDE AND ACETAMINOPHEN 5; 325 MG/1; MG/1
1 TABLET ORAL ONCE
Status: COMPLETED | OUTPATIENT
Start: 2023-08-14 | End: 2023-08-14

## 2023-08-14 RX ORDER — IBUPROFEN 600 MG/1
600 TABLET ORAL ONCE
Status: COMPLETED | OUTPATIENT
Start: 2023-08-14 | End: 2023-08-14

## 2023-08-14 RX ADMIN — IBUPROFEN 600 MG: 600 TABLET, FILM COATED ORAL at 18:43

## 2023-08-14 RX ADMIN — GABAPENTIN 400 MG: 400 CAPSULE ORAL at 19:16

## 2023-08-14 RX ADMIN — OXYCODONE AND ACETAMINOPHEN 1 TABLET: 325; 5 TABLET ORAL at 18:42

## 2023-08-14 ASSESSMENT — PAIN DESCRIPTION - PAIN TYPE: TYPE: ACUTE PAIN

## 2023-08-14 ASSESSMENT — FIBROSIS 4 INDEX: FIB4 SCORE: 2.28

## 2023-08-14 NOTE — Clinical Note
Jarod Fields was seen and treated in our emergency department on 8/14/2023.  He may return to work on 08/16/2023.       If you have any questions or concerns, please don't hesitate to call.      Jace Willett D.O.

## 2023-08-14 NOTE — ED TRIAGE NOTES
"Chief Complaint   Patient presents with    Wound Check     LLE, s/p surgery July 04  Concerned wound has reopened w/ drainage       BP (!) 155/107   Pulse 87   Temp 36.3 °C (97.3 °F) (Temporal)   Resp 15   Ht 1.778 m (5' 10\")   Wt 108 kg (237 lb 7 oz)   SpO2 94%   BMI 34.07 kg/m²     "

## 2023-08-15 NOTE — ED PROVIDER NOTES
ED Provider Note    CHIEF COMPLAINT  Chief Complaint   Patient presents with    Wound Check     LLE, s/p surgery July 04  Concerned wound has reopened w/ drainage         EXTERNAL RECORDS REVIEWED  Inpatient Notes      HPI/ROS  LIMITATION TO HISTORY     OUTSIDE HISTORIAN(S):      Jarod Fields is a 48 y.o. male who presents wound discharge.  Patient says that since surgery over a month ago he has been having ongoing pain rating down to his foot and recently noticed discharge from his wound yellowish.  Patient denies fever, chills, vomiting.  Patient says that since the surgery he had numbness across his left foot as well as sharp pain which he takes gabapentin for.  Patient has follow-up with orthopedic surgery in 2 days.  Patient concerned that he was developing infection.  Patient previously on course of oral antibiotics.    PAST MEDICAL HISTORY   has a past medical history of CRPS (complex regional pain syndrome type I) (2013) and Pain (2/18/15).    SURGICAL HISTORY   has a past surgical history that includes inject nerv blck,stellate ganglion (11/26/2014); inject nerv blck,stellate ganglion (12/3/2014); inject nerv blck,stellate ganglion (12/10/2014); percut implnt neuroelect,epidural (2/4/2015); percut implnt neuroelect,epidural (2/4/2015); spinal cord stimulator (2/19/2015); gastroscopy with biopsy (6/22/2013); other orthopedic surgery; decompress ant/lat+post leg cmpart (Left, 6/29/2023); incision and drainage orthopedic (6/29/2023); and fasciotomy (Left, 7/2/2023).    FAMILY HISTORY  Family History   Problem Relation Age of Onset    Hypertension Other     Cancer Other        SOCIAL HISTORY  Social History     Tobacco Use    Smoking status: Former     Packs/day: 0.50     Types: Cigarettes    Smokeless tobacco: Never    Tobacco comments:     1/2 ppd since 13yo, on chantix   Vaping Use    Vaping Use: Never used   Substance and Sexual Activity    Alcohol use: Not Currently    Drug use: Not Currently      "Comment: denies    Sexual activity: Yes     Partners: Female       CURRENT MEDICATIONS  Home Medications    **Home medications have not yet been reviewed for this encounter**         ALLERGIES  No Known Allergies    PHYSICAL EXAM  VITAL SIGNS: BP (!) 137/98   Pulse 81   Temp 36.9 °C (98.4 °F) (Temporal)   Resp 18   Ht 1.778 m (5' 10\")   Wt 108 kg (237 lb 7 oz)   SpO2 94%   BMI 34.07 kg/m²    No acute distress, clear bowel breath sounds, normal heart sounds, 5/5 strength in bilateral lower extremities, decree sensation over dorsum of left foot, normal DP and PT bilaterally, surgical incision with scant serosanguineous discharge, compartments soft, not warm to the touch, no surrounding erythema    DIAGNOSTIC STUDIES / PROCEDURES  EKG    LABS      RADIOLOGY      COURSE & MEDICAL DECISION MAKING    ED Observation Status?     INITIAL ASSESSMENT, COURSE AND PLAN  Care Narrative: Differential includes hematoma, abscess, compartment syndrome, cellulitis.  Overall lower suspicion for infection as discharge is serosanguineous, no warmth, no surrounding erythema no systemic symptoms or signs of infection.  Low suspicion for DVT as swelling is across the tibia with no calf swelling, tenderness.  No signs of arterial compromise.  Patient with stable numbness postoperatively.  Spoke with Dr. Waldron who advises that patient's compartments are permanently open postoperatively overall low suspicion for compartment syndrome.  He recommends follow-up in his office tomorrow.  Discussed with patient plan for orthopedic follow-up tomorrow, return precautions which she is comfortable with.        ADDITIONAL PROBLEM LIST    DISPOSITION AND DISCUSSIONS  I have discussed management of the patient with the following physicians and DENNY's: Orthopedic surgery    Discussion of management with other QHP or appropriate source(s):      Escalation of care considered, and ultimately not performed:    Barriers to care at this time, " including but not limited to: .     Decision tools and prescription drugs considered including, but not limited to: .    FINAL DIAGNOSIS  1. Wound drainage           Electronically signed by: Jace Willett D.O., 8/14/2023 7:37 PM

## 2023-08-15 NOTE — DISCHARGE INSTRUCTIONS
As we discussed you should follow with Dr. Arias tomorrow.  If you have uncontrolled pain, fever he should come back to the emergency department.  You can take your prescribed gabapentin.

## 2023-08-23 ENCOUNTER — OFFICE VISIT (OUTPATIENT)
Dept: URGENT CARE | Facility: PHYSICIAN GROUP | Age: 48
End: 2023-08-23
Payer: COMMERCIAL

## 2023-08-23 VITALS
OXYGEN SATURATION: 94 % | RESPIRATION RATE: 18 BRPM | SYSTOLIC BLOOD PRESSURE: 138 MMHG | HEART RATE: 122 BPM | WEIGHT: 237 LBS | DIASTOLIC BLOOD PRESSURE: 86 MMHG | HEIGHT: 70 IN | BODY MASS INDEX: 33.93 KG/M2 | TEMPERATURE: 97.2 F

## 2023-08-23 DIAGNOSIS — J02.9 PHARYNGITIS, UNSPECIFIED ETIOLOGY: ICD-10-CM

## 2023-08-23 DIAGNOSIS — B34.9 ACUTE VIRAL SYNDROME: ICD-10-CM

## 2023-08-23 DIAGNOSIS — R11.2 NAUSEA AND VOMITING, UNSPECIFIED VOMITING TYPE: ICD-10-CM

## 2023-08-23 LAB
FLUAV RNA SPEC QL NAA+PROBE: NEGATIVE
FLUBV RNA SPEC QL NAA+PROBE: NEGATIVE
RSV RNA SPEC QL NAA+PROBE: NEGATIVE
S PYO DNA SPEC NAA+PROBE: NOT DETECTED
SARS-COV-2 RNA RESP QL NAA+PROBE: NEGATIVE

## 2023-08-23 PROCEDURE — 99215 OFFICE O/P EST HI 40 MIN: CPT | Performed by: STUDENT IN AN ORGANIZED HEALTH CARE EDUCATION/TRAINING PROGRAM

## 2023-08-23 PROCEDURE — 3079F DIAST BP 80-89 MM HG: CPT | Performed by: STUDENT IN AN ORGANIZED HEALTH CARE EDUCATION/TRAINING PROGRAM

## 2023-08-23 PROCEDURE — 0241U POCT CEPHEID COV-2, FLU A/B, RSV - PCR: CPT | Performed by: STUDENT IN AN ORGANIZED HEALTH CARE EDUCATION/TRAINING PROGRAM

## 2023-08-23 PROCEDURE — 87651 STREP A DNA AMP PROBE: CPT | Performed by: STUDENT IN AN ORGANIZED HEALTH CARE EDUCATION/TRAINING PROGRAM

## 2023-08-23 PROCEDURE — 3075F SYST BP GE 130 - 139MM HG: CPT | Performed by: STUDENT IN AN ORGANIZED HEALTH CARE EDUCATION/TRAINING PROGRAM

## 2023-08-23 RX ORDER — ONDANSETRON 4 MG/1
4 TABLET, ORALLY DISINTEGRATING ORAL ONCE
Status: COMPLETED | OUTPATIENT
Start: 2023-08-23 | End: 2023-08-23

## 2023-08-23 RX ORDER — ONDANSETRON 4 MG/1
4 TABLET, ORALLY DISINTEGRATING ORAL EVERY 6 HOURS PRN
Qty: 20 TABLET | Refills: 0 | Status: SHIPPED | OUTPATIENT
Start: 2023-08-23 | End: 2023-09-08

## 2023-08-23 RX ADMIN — ONDANSETRON 4 MG: 4 TABLET, ORALLY DISINTEGRATING ORAL at 13:12

## 2023-08-23 ASSESSMENT — FIBROSIS 4 INDEX: FIB4 SCORE: 2.28

## 2023-08-23 NOTE — PROGRESS NOTES
Subjective:   CHIEF COMPLAINT  Chief Complaint   Patient presents with    Illness     Started this morning cold chills, vomiting , sore throat , SOB        HPI  Jarod Fields is a 48 y.o. male who presents with a chief complaint of a sore throat and chills.  Said symptoms started around 3 AM this morning which woke him up.  Subsequently developed nausea and vomiting.  Reports 3 episode of emesis.  No hematemesis.  Also says he is have a productive cough; no hemoptysis.  He tried taking NyQuil and ibuprofen which has provided limited relief of symptoms.  Said he had a fever with Tmax of one 101.4 orally prior to arrival but is currently afebrile.  After vomiting says he developed left lower quadrant abdominal pain which has been persistent since that time.  Says he is unable to hold down any solids or liquids due to the nausea/vomiting.  Reports he also developed diarrhea this morning.  He is uncertain if he has had any colonoscopies in the past.  Reports his parents are currently sick.    REVIEW OF SYSTEMS  General: admits chills  GI: Admits nausea and vomiting  See HPI for further details.    PAST MEDICAL HISTORY  Patient Active Problem List    Diagnosis Date Noted    Vitamin D deficiency 12/20/2022    Pain of left upper extremity 12/14/2022    Shortness of breath 12/14/2022    Pain of left lower extremity 12/14/2022    Elevated LDL cholesterol level 12/14/2022    Intractable low back pain 08/16/2022    Intractable back pain with lumbosacral radiculopathy 08/15/2022    Elevated blood pressure reading 08/15/2022    Dysuria 05/15/2021    Reflex sympathetic dystrophy of lower limb 02/19/2015    Reflex sympathetic dystrophy of upper extremity 12/03/2014    Back pain 11/26/2014    Hyponatremia 06/22/2013    Hypokalemia 06/22/2013    Abdominal pain, other specified site 06/21/2013    Transaminitis 06/21/2013       SURGICAL HISTORY   has a past surgical history that includes inject nerv blck,stellate ganglion  "(11/26/2014); inject nerv blck,stellate ganglion (12/3/2014); inject nerv blck,stellate ganglion (12/10/2014); percut implnt neuroelect,epidural (2/4/2015); percut implnt neuroelect,epidural (2/4/2015); spinal cord stimulator (2/19/2015); gastroscopy with biopsy (6/22/2013); other orthopedic surgery; decompress ant/lat+post leg cmpart (Left, 6/29/2023); incision and drainage orthopedic (6/29/2023); and fasciotomy (Left, 7/2/2023).    ALLERGIES  No Known Allergies    CURRENT MEDICATIONS  Home Medications       Reviewed by Jarod Dang D.O. (Physician) on 08/23/23 at 1302  Med List Status: <None>     Medication Last Dose Status   gabapentin (NEURONTIN) 100 MG Cap Not Taking Active   ibuprofen (MOTRIN) 600 MG Tab PRN Active   lidocaine (LIDODERM) 5 % Patch Not Taking Active   ondansetron (Zofran ODT) dispertab 4 mg  Active   oxyCODONE-acetaminophen (PERCOCET-10)  MG Tab Not Taking Active                    SOCIAL HISTORY  Social History     Tobacco Use    Smoking status: Former     Current packs/day: 0.50     Types: Cigarettes    Smokeless tobacco: Never    Tobacco comments:     1/2 ppd since 13yo, on chantix   Vaping Use    Vaping Use: Never used   Substance and Sexual Activity    Alcohol use: Not Currently    Drug use: Not Currently     Comment: denies    Sexual activity: Yes     Partners: Female       FAMILY HISTORY  Family History   Problem Relation Age of Onset    Hypertension Other     Cancer Other           Objective:   PHYSICAL EXAM  VITAL SIGNS: /86 (BP Location: Right arm, Patient Position: Sitting, BP Cuff Size: Adult)   Pulse (!) 122   Temp 36.2 °C (97.2 °F) (Temporal)   Resp 18   Ht 1.778 m (5' 10\")   Wt 108 kg (237 lb)   SpO2 94%   BMI 34.01 kg/m²     Gen: no acute distress, normal voice  Skin: dry, intact, moist mucosal membranes  Eyes: No conjunctival injection bilaterally.  Neck: Normal range of motion. No meningeal signs.   Lungs: No increased work of breathing.  CTAB w/ " symmetric expansion  CV: Sinus tachycardia w/o murmurs or clicks  Abdomen: Bowel sounds normal, soft, no tenderness, rebound or guarding.   Psych: normal affect, normal judgement, alert, awake    POC Strep (cepheid - PCR): negative  POC COVID, influenza, RSV: Negative    Assessment/Plan:     1. Acute viral syndrome        2. Pharyngitis, unspecified etiology  POCT GROUP A STREP, PCR    POCT CoV-2, Flu A/B, RSV by PCR      3. Nausea and vomiting, unspecified vomiting type  POCT GROUP A STREP, PCR    POCT CoV-2, Flu A/B, RSV by PCR    ondansetron (Zofran ODT) dispertab 4 mg    ondansetron (ZOFRAN ODT) 4 MG TABLET DISPERSIBLE      Patient was given Zofran tolerated po fluids during the encounter.  He is currently afebrile, nontoxic and in no acute distress.  Abdominal examination was reassuring without any focal tenderness to palpation or peritoneal signs.  No red flags.  Likely viral etiology.  Point-of-care testing today was negative however realistically is early for reliable testing given onset of symptoms earlier this morning.  I spoke with patient over the phone informing him of the negative test results.  I discussed at length red flags and return/ED precautions.  Patient undeserving discussed today and all questions were answered.  -Ordered Zofran  -Relative rest, fluid hydration, ibuprofen and Tylenol  - Return to urgent care any new/worsening symptoms or further questions or concerns.  Patient understood everything discussed.  All questions were answered.      Differential diagnosis and supportive care discussed. Follow-up as needed if symptoms worsen or fail to improve to PCP, Urgent care or Emergency Room.    43 minutes was spent on this encounter including speaking with the patient over the phone, face-to-face time, discussing the diagnosis, medical management, emergency room precautions and charting. This does not include time spent on separately billable procedures/tests.      Please note that this  dictation was created using voice recognition software. I have made a reasonable attempt to correct obvious errors, but I expect that there are errors of grammar and possibly content that I did not discover before finalizing the note.

## 2023-09-08 ENCOUNTER — APPOINTMENT (OUTPATIENT)
Dept: ADMISSIONS | Facility: MEDICAL CENTER | Age: 48
End: 2023-09-08
Attending: ORTHOPAEDIC SURGERY
Payer: COMMERCIAL

## 2023-09-08 PROBLEM — T81.30XA WOUND DEHISCENCE: Status: ACTIVE | Noted: 2023-09-08

## 2023-09-09 ENCOUNTER — ANESTHESIA EVENT (OUTPATIENT)
Dept: SURGERY | Facility: MEDICAL CENTER | Age: 48
End: 2023-09-09
Payer: COMMERCIAL

## 2023-09-09 ENCOUNTER — ANESTHESIA (OUTPATIENT)
Dept: SURGERY | Facility: MEDICAL CENTER | Age: 48
End: 2023-09-09
Payer: COMMERCIAL

## 2023-09-09 ENCOUNTER — PHARMACY VISIT (OUTPATIENT)
Dept: PHARMACY | Facility: MEDICAL CENTER | Age: 48
End: 2023-09-09
Payer: COMMERCIAL

## 2023-09-09 ENCOUNTER — HOSPITAL ENCOUNTER (OUTPATIENT)
Facility: MEDICAL CENTER | Age: 48
End: 2023-09-09
Attending: ORTHOPAEDIC SURGERY | Admitting: ORTHOPAEDIC SURGERY
Payer: COMMERCIAL

## 2023-09-09 VITALS
DIASTOLIC BLOOD PRESSURE: 86 MMHG | WEIGHT: 226.63 LBS | TEMPERATURE: 97.1 F | OXYGEN SATURATION: 94 % | HEIGHT: 71 IN | SYSTOLIC BLOOD PRESSURE: 131 MMHG | RESPIRATION RATE: 18 BRPM | HEART RATE: 74 BPM | BODY MASS INDEX: 31.73 KG/M2

## 2023-09-09 DIAGNOSIS — T81.30XA WOUND DEHISCENCE: ICD-10-CM

## 2023-09-09 LAB
GRAM STN SPEC: NORMAL
SIGNIFICANT IND 70042: NORMAL
SITE SITE: NORMAL
SOURCE SOURCE: NORMAL

## 2023-09-09 PROCEDURE — 87077 CULTURE AEROBIC IDENTIFY: CPT

## 2023-09-09 PROCEDURE — 97605 NEG PRS WND THER DME<=50SQCM: CPT | Mod: 78 | Performed by: ORTHOPAEDIC SURGERY

## 2023-09-09 PROCEDURE — 87102 FUNGUS ISOLATION CULTURE: CPT

## 2023-09-09 PROCEDURE — 700105 HCHG RX REV CODE 258: Performed by: ANESTHESIOLOGY

## 2023-09-09 PROCEDURE — 87147 CULTURE TYPE IMMUNOLOGIC: CPT

## 2023-09-09 PROCEDURE — 160025 RECOVERY II MINUTES (STATS): Performed by: ORTHOPAEDIC SURGERY

## 2023-09-09 PROCEDURE — RXMED WILLOW AMBULATORY MEDICATION CHARGE: Performed by: ORTHOPAEDIC SURGERY

## 2023-09-09 PROCEDURE — 87076 CULTURE ANAEROBE IDENT EACH: CPT

## 2023-09-09 PROCEDURE — 160027 HCHG SURGERY MINUTES - 1ST 30 MINS LEVEL 2: Performed by: ORTHOPAEDIC SURGERY

## 2023-09-09 PROCEDURE — 700101 HCHG RX REV CODE 250: Performed by: ANESTHESIOLOGY

## 2023-09-09 PROCEDURE — 160035 HCHG PACU - 1ST 60 MINS PHASE I: Performed by: ORTHOPAEDIC SURGERY

## 2023-09-09 PROCEDURE — 700111 HCHG RX REV CODE 636 W/ 250 OVERRIDE (IP): Mod: JZ | Performed by: ORTHOPAEDIC SURGERY

## 2023-09-09 PROCEDURE — 87205 SMEAR GRAM STAIN: CPT | Mod: 91

## 2023-09-09 PROCEDURE — 160048 HCHG OR STATISTICAL LEVEL 1-5: Performed by: ORTHOPAEDIC SURGERY

## 2023-09-09 PROCEDURE — 160009 HCHG ANES TIME/MIN: Performed by: ORTHOPAEDIC SURGERY

## 2023-09-09 PROCEDURE — 87075 CULTR BACTERIA EXCEPT BLOOD: CPT

## 2023-09-09 PROCEDURE — 160002 HCHG RECOVERY MINUTES (STAT): Performed by: ORTHOPAEDIC SURGERY

## 2023-09-09 PROCEDURE — 700111 HCHG RX REV CODE 636 W/ 250 OVERRIDE (IP): Mod: JZ | Performed by: ANESTHESIOLOGY

## 2023-09-09 PROCEDURE — 87070 CULTURE OTHR SPECIMN AEROBIC: CPT

## 2023-09-09 PROCEDURE — 87186 SC STD MICRODIL/AGAR DIL: CPT

## 2023-09-09 PROCEDURE — 13160 SEC CLSR SURG WND/DEHSN XTN: CPT | Mod: 78,LT | Performed by: ORTHOPAEDIC SURGERY

## 2023-09-09 PROCEDURE — 160046 HCHG PACU - 1ST 60 MINS PHASE II: Performed by: ORTHOPAEDIC SURGERY

## 2023-09-09 RX ORDER — SODIUM CHLORIDE, SODIUM LACTATE, POTASSIUM CHLORIDE, CALCIUM CHLORIDE 600; 310; 30; 20 MG/100ML; MG/100ML; MG/100ML; MG/100ML
INJECTION, SOLUTION INTRAVENOUS
Status: DISCONTINUED | OUTPATIENT
Start: 2023-09-09 | End: 2023-09-09 | Stop reason: SURG

## 2023-09-09 RX ORDER — KETOROLAC TROMETHAMINE 30 MG/ML
INJECTION, SOLUTION INTRAMUSCULAR; INTRAVENOUS PRN
Status: DISCONTINUED | OUTPATIENT
Start: 2023-09-09 | End: 2023-09-09 | Stop reason: SURG

## 2023-09-09 RX ORDER — IPRATROPIUM BROMIDE AND ALBUTEROL SULFATE 2.5; .5 MG/3ML; MG/3ML
3 SOLUTION RESPIRATORY (INHALATION)
Status: DISCONTINUED | OUTPATIENT
Start: 2023-09-09 | End: 2023-09-09 | Stop reason: HOSPADM

## 2023-09-09 RX ORDER — CEFAZOLIN SODIUM 1 G/3ML
2 INJECTION, POWDER, FOR SOLUTION INTRAMUSCULAR; INTRAVENOUS ONCE
Status: DISCONTINUED | OUTPATIENT
Start: 2023-09-09 | End: 2023-09-09 | Stop reason: HOSPADM

## 2023-09-09 RX ORDER — OXYCODONE HCL 5 MG/5 ML
10 SOLUTION, ORAL ORAL
Status: DISCONTINUED | OUTPATIENT
Start: 2023-09-09 | End: 2023-09-09 | Stop reason: HOSPADM

## 2023-09-09 RX ORDER — MIDAZOLAM HYDROCHLORIDE 1 MG/ML
1 INJECTION INTRAMUSCULAR; INTRAVENOUS
Status: DISCONTINUED | OUTPATIENT
Start: 2023-09-09 | End: 2023-09-09 | Stop reason: HOSPADM

## 2023-09-09 RX ORDER — OXYCODONE HYDROCHLORIDE 5 MG/1
5 TABLET ORAL EVERY 4 HOURS PRN
Qty: 20 TABLET | Refills: 0 | Status: SHIPPED | OUTPATIENT
Start: 2023-09-09 | End: 2023-09-14

## 2023-09-09 RX ORDER — OXYCODONE HCL 5 MG/5 ML
5 SOLUTION, ORAL ORAL
Status: DISCONTINUED | OUTPATIENT
Start: 2023-09-09 | End: 2023-09-09 | Stop reason: HOSPADM

## 2023-09-09 RX ORDER — HYDROMORPHONE HYDROCHLORIDE 1 MG/ML
1 INJECTION, SOLUTION INTRAMUSCULAR; INTRAVENOUS; SUBCUTANEOUS
Status: DISCONTINUED | OUTPATIENT
Start: 2023-09-09 | End: 2023-09-09 | Stop reason: HOSPADM

## 2023-09-09 RX ORDER — DEXAMETHASONE SODIUM PHOSPHATE 4 MG/ML
INJECTION, SOLUTION INTRA-ARTICULAR; INTRALESIONAL; INTRAMUSCULAR; INTRAVENOUS; SOFT TISSUE PRN
Status: DISCONTINUED | OUTPATIENT
Start: 2023-09-09 | End: 2023-09-09 | Stop reason: SURG

## 2023-09-09 RX ORDER — VANCOMYCIN HYDROCHLORIDE 1 G/20ML
INJECTION, POWDER, LYOPHILIZED, FOR SOLUTION INTRAVENOUS
Status: COMPLETED | OUTPATIENT
Start: 2023-09-09 | End: 2023-09-09

## 2023-09-09 RX ORDER — ONDANSETRON 2 MG/ML
4 INJECTION INTRAMUSCULAR; INTRAVENOUS
Status: DISCONTINUED | OUTPATIENT
Start: 2023-09-09 | End: 2023-09-09 | Stop reason: HOSPADM

## 2023-09-09 RX ORDER — MEPERIDINE HYDROCHLORIDE 25 MG/ML
12.5 INJECTION INTRAMUSCULAR; INTRAVENOUS; SUBCUTANEOUS
Status: DISCONTINUED | OUTPATIENT
Start: 2023-09-09 | End: 2023-09-09 | Stop reason: HOSPADM

## 2023-09-09 RX ORDER — CEFAZOLIN SODIUM 1 G/3ML
INJECTION, POWDER, FOR SOLUTION INTRAMUSCULAR; INTRAVENOUS PRN
Status: DISCONTINUED | OUTPATIENT
Start: 2023-09-09 | End: 2023-09-09 | Stop reason: SURG

## 2023-09-09 RX ORDER — ONDANSETRON 2 MG/ML
INJECTION INTRAMUSCULAR; INTRAVENOUS PRN
Status: DISCONTINUED | OUTPATIENT
Start: 2023-09-09 | End: 2023-09-09 | Stop reason: SURG

## 2023-09-09 RX ORDER — HYDROMORPHONE HYDROCHLORIDE 1 MG/ML
0.2 INJECTION, SOLUTION INTRAMUSCULAR; INTRAVENOUS; SUBCUTANEOUS
Status: DISCONTINUED | OUTPATIENT
Start: 2023-09-09 | End: 2023-09-09 | Stop reason: HOSPADM

## 2023-09-09 RX ORDER — HYDROMORPHONE HYDROCHLORIDE 1 MG/ML
0.4 INJECTION, SOLUTION INTRAMUSCULAR; INTRAVENOUS; SUBCUTANEOUS
Status: DISCONTINUED | OUTPATIENT
Start: 2023-09-09 | End: 2023-09-09 | Stop reason: HOSPADM

## 2023-09-09 RX ORDER — SODIUM CHLORIDE, SODIUM GLUCONATE, SODIUM ACETATE, POTASSIUM CHLORIDE AND MAGNESIUM CHLORIDE 526; 502; 368; 37; 30 MG/100ML; MG/100ML; MG/100ML; MG/100ML; MG/100ML
500 INJECTION, SOLUTION INTRAVENOUS CONTINUOUS
Status: DISCONTINUED | OUTPATIENT
Start: 2023-09-09 | End: 2023-09-09 | Stop reason: HOSPADM

## 2023-09-09 RX ORDER — HALOPERIDOL 5 MG/ML
1 INJECTION INTRAMUSCULAR
Status: DISCONTINUED | OUTPATIENT
Start: 2023-09-09 | End: 2023-09-09 | Stop reason: HOSPADM

## 2023-09-09 RX ORDER — DIPHENHYDRAMINE HYDROCHLORIDE 50 MG/ML
12.5 INJECTION INTRAMUSCULAR; INTRAVENOUS
Status: DISCONTINUED | OUTPATIENT
Start: 2023-09-09 | End: 2023-09-09 | Stop reason: HOSPADM

## 2023-09-09 RX ORDER — SULFAMETHOXAZOLE AND TRIMETHOPRIM 800; 160 MG/1; MG/1
1 TABLET ORAL 2 TIMES DAILY
Qty: 20 TABLET | Refills: 0 | Status: SHIPPED | OUTPATIENT
Start: 2023-09-09 | End: 2023-09-22

## 2023-09-09 RX ORDER — LIDOCAINE HYDROCHLORIDE 20 MG/ML
INJECTION, SOLUTION EPIDURAL; INFILTRATION; INTRACAUDAL; PERINEURAL PRN
Status: DISCONTINUED | OUTPATIENT
Start: 2023-09-09 | End: 2023-09-09 | Stop reason: SURG

## 2023-09-09 RX ADMIN — DEXAMETHASONE SODIUM PHOSPHATE 8 MG: 4 INJECTION INTRA-ARTICULAR; INTRALESIONAL; INTRAMUSCULAR; INTRAVENOUS; SOFT TISSUE at 08:46

## 2023-09-09 RX ADMIN — SUGAMMADEX 200 MG: 100 INJECTION, SOLUTION INTRAVENOUS at 08:59

## 2023-09-09 RX ADMIN — FENTANYL CITRATE 100 MCG: 50 INJECTION, SOLUTION INTRAMUSCULAR; INTRAVENOUS at 08:40

## 2023-09-09 RX ADMIN — CEFAZOLIN 2 G: 1 INJECTION, POWDER, FOR SOLUTION INTRAMUSCULAR; INTRAVENOUS at 08:43

## 2023-09-09 RX ADMIN — MIDAZOLAM 2 MG: 1 INJECTION, SOLUTION INTRAMUSCULAR; INTRAVENOUS at 08:40

## 2023-09-09 RX ADMIN — ROCURONIUM BROMIDE 50 MG: 10 INJECTION, SOLUTION INTRAVENOUS at 08:43

## 2023-09-09 RX ADMIN — ONDANSETRON 4 MG: 2 INJECTION INTRAMUSCULAR; INTRAVENOUS at 08:59

## 2023-09-09 RX ADMIN — LIDOCAINE HYDROCHLORIDE 50 MG: 20 INJECTION, SOLUTION EPIDURAL; INFILTRATION; INTRACAUDAL at 08:43

## 2023-09-09 RX ADMIN — PROPOFOL 150 MG: 10 INJECTION, EMULSION INTRAVENOUS at 08:43

## 2023-09-09 RX ADMIN — SODIUM CHLORIDE, POTASSIUM CHLORIDE, SODIUM LACTATE AND CALCIUM CHLORIDE: 600; 310; 30; 20 INJECTION, SOLUTION INTRAVENOUS at 08:39

## 2023-09-09 RX ADMIN — KETOROLAC TROMETHAMINE 30 MG: 30 INJECTION, SOLUTION INTRAMUSCULAR; INTRAVENOUS at 08:59

## 2023-09-09 ASSESSMENT — PAIN SCALES - GENERAL: PAIN_LEVEL: 1

## 2023-09-09 ASSESSMENT — PAIN DESCRIPTION - PAIN TYPE
TYPE: ACUTE PAIN
TYPE: SURGICAL PAIN

## 2023-09-09 ASSESSMENT — FIBROSIS 4 INDEX: FIB4 SCORE: 2.28

## 2023-09-09 NOTE — ANESTHESIA PROCEDURE NOTES
Airway    Date/Time: 9/9/2023 8:43 AM    Performed by: Gabriel Walker III, M.D.  Authorized by: Gabriel Walker III, M.D.    Location:  OR  Urgency:  Elective  Difficult Airway: No    Indications for Airway Management:  Anesthesia      Spontaneous Ventilation: absent    Sedation Level:  Deep  Preoxygenated: Yes    Final Airway Type:  Supraglottic airway  Final Supraglottic Airway:  Standard LMA    Number of Attempts at Approach:  1

## 2023-09-09 NOTE — OR NURSING
Patient arrived in PACU, VSS. Left calf has KVNG dressing intact.   He is alert and oriented, drinking water, with no complaints of pain or discomfort. Able to have perfect ROM with left foot.   Report called to phase 2. Transported to room # 7.

## 2023-09-09 NOTE — ANESTHESIA TIME REPORT
Anesthesia Start and Stop Event Times     Date Time Event    9/9/2023 0829 Ready for Procedure     0839 Anesthesia Start     0909 Anesthesia Stop        Responsible Staff  09/09/23    Name Role Begin End    Gabriel Walker III, M.D. Anesth 0839 0909        Overtime Reason:  no overtime (within assigned shift)    Comments:

## 2023-09-09 NOTE — ANESTHESIA POSTPROCEDURE EVALUATION
Patient: Jarod Fields    Procedure Summary     Date: 09/09/23 Room / Location: [place] [address] / SURGERY [facility]    Anesthesia Start: 0839 Anesthesia Stop: 0909    Procedures:       LEFT LOWER LEG IRRIGATION AND DEBRIDEMENT, WOUND AND WOUND CLOSURE (Left: Leg Lower)      CLOSURE, WOUND, SECONDARY (Left: Leg Lower) Diagnosis:       Wound dehiscence      (Wound dehiscence)    Surgeons: Max Arias M.D. Responsible Provider: Gabirel Walker III, M.D.    Anesthesia Type: general ASA Status: 3 - Emergent          Final Anesthesia Type: general  Last vitals  BP   Blood Pressure: 131/86    Temp   36.2 °C (97.1 °F)    Pulse   74   Resp   18    SpO2   94 %      Anesthesia Post Evaluation    Patient location during evaluation: PACU  Patient participation: complete - patient participated  Level of consciousness: awake and alert  Pain score: 1    Airway patency: patent  Anesthetic complications: no  Cardiovascular status: hemodynamically stable  Respiratory status: acceptable  Hydration status: euvolemic    PONV: none          No notable events documented.     Nurse Pain Score: 1 (NPRS)

## 2023-09-09 NOTE — LETTER
September 8, 2023    Patient Name: Jarod Fields  Surgeon Name: Max Arias M.D.  Surgery Facility: [facility] ([address] [facility])  Surgery Date: 9/9/2023    The time of your surgery is not final and may change up to and until the day of your surgery. You will be contacted 24-48 hours prior to your surgery date with your check-in and surgery time.    If you will not be at one of the below numbers please call the surgery scheduler at [phone]  Preferred Phone: [phone]    BEFORE YOUR SURGERY   Pre Registration and/or Lab Work must be done within and no earlier than 28 days prior to your surgery date. Your scheduled facility will contact you regarding all required preregistration and/or lab work. If you have not been contacted within 7 days of your scheduled procedure please call [facility] at [phone] for an appointment as soon as possible.      DAY OF YOUR SURGERY  Nothing to eat or drink after midnight     Refrain from smoking any substance after midnight prior to surgery. Smoking may interfere with the anesthetic and frequently produces nausea during the recovery period.    Continue taking all lifesaving medications. Including the morning of your surgery with small sip of water.    Please arrive at the hospital/surgery center at the check-in time provided.     An adult will need to bring you and take you home after your surgery.     AFTER YOUR SURGERY  Post op Appointment:   Date: 9.21.23   Time: 9:00 AM   With: Yaniv Rider PA-C    Location: [address] [place] [id number]    - Post Surgery - You will need someone to drive you home  - Post Surgery - You will need someone to stay with you for 24 hours       TIME OFF WORK  FMLA or Disability forms can be faxed directly to: [phone] or you may drop them off at [facility] [place] [address]. Our office charges a $35.00 fee per form. Forms will be completed within 10 business days of drop off  and payment received. For the status of your forms you may contact our disability office directly [phone].    MEDICATION INSTRUCTIONS **Please read section completely**    The following medications should be stopped a minimum of 10 days prior to surgery:  All over the counter, Supplements & Herbal medications    Anorectics: Phentermine (Adipex-P, Lomaira and Suprenza), Phentermine-topiramate (Qsymia), Bupropion-naltrexone (Contrave)    Opiod Partial Agonists/Opioid Antagonists: Buprenorphine (Subocone, Belbuca, Butrans, Probuphine Implant, Sublocade), Naltrexone (ReVia, Vivitrol), Naloxone    Amphetamines: Dextroamphetamine/Amphetamine (Adderall, Mydayis), Methylphenidate Hydrochloride (Concerta, Metadate, Methylin, Ritalin)    The following medications should be stopped 5 days prior to surgery:  Blood Thinners: Any Aspirin, Aspirin products, anti-inflammatories such as ibuprofen and any blood thinners such as Coumadin and Plavix. Please consult your prescribing physician if you are on life saving blood thinners, in regards to when to stop medications prior to surgery.     The following medications should be stopped a minimum of 3 days prior to surgery:  PDE-5 inhibitors: Sildenafil (Viagra), Tadalafil (Cialis), Vardenafil (Levitra), Avanafil (Stendra)    MAO Inhibitors: Rasagiline (Azilect), Selegiline (Eldepryl, Emsam, Selapar), Isocarboxazid (Marplan), Phenelzine (Nardil)

## 2023-09-09 NOTE — ANESTHESIA PREPROCEDURE EVALUATION
Case: [id number] Date/Time: 09/09/23 0815    Procedures:       LEFT LOWER LEG IRRIGATION AND DEBRIDEMENT, WOUND AND WOUND CLOSURE (Left)      CLOSURE, WOUND, SECONDARY    Diagnosis: Wound dehiscence [T81.30XA]    Location: [place] [address] / SURGERY [facility]    Surgeons: Max Arias M.D.          Relevant Problems   PULMONARY   (positive) Shortness of breath       Physical Exam    Airway   Mallampati: II  TM distance: >3 FB  Neck ROM: full       Cardiovascular - normal exam  Rhythm: regular  Rate: normal  (-) murmur     Dental - normal exam           Pulmonary - normal exam  Breath sounds clear to auscultation     Abdominal    Neurological - normal exam         Other findings: CRPS, open infected wound            Anesthesia Plan    ASA 3- EMERGENT       Plan - general               Induction: intravenous    Postoperative Plan: Postoperative administration of opioids is intended.    Pertinent diagnostic labs and testing reviewed    Informed Consent:    Anesthetic plan and risks discussed with patient.    Use of blood products discussed with: patient whom consented to blood products.

## 2023-09-09 NOTE — DISCHARGE INSTRUCTIONS
HOME CARE INSTRUCTIONS    ACTIVITY: Rest and take it easy for the first 24 hours.  A responsible adult is recommended to remain with you during that time.  It is normal to feel sleepy.  We encourage you to not do anything that requires balance, judgment or coordination.    FOR 24 HOURS DO NOT:  Drive, operate machinery or run household appliances.  Drink beer or alcoholic beverages.  Make important decisions or sign legal documents.    SPECIAL INSTRUCTIONS: keep clean and dry until follow up    DIET: To avoid nausea, slowly advance diet as tolerated, avoiding spicy or greasy foods for the first day.  Add more substantial food to your diet according to your physician's instructions.  Babies can be fed formula or breast milk as soon as they are hungry.  INCREASE FLUIDS AND FIBER TO AVOID CONSTIPATION.      MEDICATIONS: Resume taking daily medication.  Take prescribed pain medication with food.  If no medication is prescribed, you may take non-aspirin pain medication if needed.  PAIN MEDICATION CAN BE VERY CONSTIPATING.  Take a stool softener or laxative such as senokot, pericolace, or milk of magnesia if needed.    Prescription given for oxycodone.  May Take dose anytime.    A follow-up appointment should be arranged with your doctor in 1-2 weeks; call to schedule.    You should CALL YOUR PHYSICIAN if you develop:  Fever greater than 101 degrees F.  Pain not relieved by medication, or persistent nausea or vomiting.  Excessive bleeding (blood soaking through dressing) or unexpected drainage from the wound.  Extreme redness or swelling around the incision site, drainage of pus or foul smelling drainage.  Inability to urinate or empty your bladder within 8 hours.  Problems with breathing or chest pain.    You should call 911 if you develop problems with breathing or chest pain.  If you are unable to contact your doctor or surgical center, you should go to the nearest emergency room or urgent care center.  Physician's  telephone #: [phone]    MILD FLU-LIKE SYMPTOMS ARE NORMAL.  YOU MAY EXPERIENCE GENERALIZED MUSCLE ACHES, THROAT IRRITATION, HEADACHE AND/OR SOME NAUSEA.    If any questions arise, call your doctor.  If your doctor is not available, please feel free to call the Surgical Center at [phone].  The Center is open Monday through Friday from 7AM to 7PM.      A registered nurse may call you a few days after your surgery to see how you are doing after your procedure.    You may also receive a survey in the mail within the next two weeks and we ask that you take a few moments to complete the survey and return it to us.  Our goal is to provide you with very good care and we value your comments.     Depression / Suicide Risk    As you are discharged from this [facility] facility, it is important to learn how to keep safe from harming yourself.    Recognize the warning signs:  Abrupt changes in personality, positive or negative- including increase in energy   Giving away possessions  Change in eating patterns- significant weight changes-  positive or negative  Change in sleeping patterns- unable to sleep or sleeping all the time   Unwillingness or inability to communicate  Depression  Unusual sadness, discouragement and loneliness  Talk of wanting to die  Neglect of personal appearance   Rebelliousness- reckless behavior  Withdrawal from people/activities they love  Confusion- inability to concentrate     If you or a loved one observes any of these behaviors or has concerns about self-harm, here's what you can do:  Talk about it- your feelings and reasons for harming yourself  Remove any Incision and Drainage, Care After  This sheet gives you information about how to care for yourself after your procedure. Your health care provider may also give you more specific instructions. If you have problems or questions, contact your health care provider.  What can I expect after the procedure?  After the procedure, it is common  to have:  Pain or discomfort around the incision site.  Blood, fluid, or pus (drainage) from the incision.  Redness and firm skin around the incision site.  Follow these instructions at home:  Medicines  Take over-the-counter and prescription medicines only as told by your health care provider.  If you were prescribed an antibiotic medicine, use or take it as told by your health care provider. Do not stop using the antibiotic even if you start to feel better.  Wound care  Follow instructions from your health care provider about how to take care of your wound. Make sure you:  Wash your hands with soap and water before and after you change your bandage (dressing). If soap and water are not available, use hand .  Change your dressing and packing as told by your health care provider.  If your dressing is dry or stuck when you try to remove it, moisten or wet the dressing with saline or water so that it can be removed without harming your skin or tissues.  If your wound is packed, leave it in place until your health care provider tells you to remove it. To remove the packing, moisten or wet the packing with saline or water so that it can be removed without harming your skin or tissues.  Leave stitches (sutures), skin glue, or adhesive strips in place. These skin closures may need to stay in place for 2 weeks or longer. If adhesive strip edges start to loosen and curl up, you may trim the loose edges. Do not remove adhesive strips completely unless your health care provider tells you to do that.  Check your wound every day for signs of infection. Check for:  More redness, swelling, or pain.  More fluid or blood.  Warmth.  Pus or a bad smell.  If you were sent home with a drain tube in place, follow instructions from your health care provider about:  How to empty it.  How to care for it at home.    General instructions  Rest the affected area.  Do not take baths, swim, or use a hot tub until your health care  provider approves. Ask your health care provider if you may take showers. You may only be allowed to take sponge baths.  Return to your normal activities as told by your health care provider. Ask your health care provider what activities are safe for you. Your health care provider may put you on activity or lifting restrictions.  The incision will continue to drain. It is normal to have some clear or slightly bloody drainage. The amount of drainage should lessen each day.  Do not apply any creams, ointments, or liquids unless you have been told to by your health care provider.  Keep all follow-up visits as told by your health care provider. This is important.  Contact a health care provider if:  Your cyst or abscess returns.  You have more redness, swelling, or pain around your incision.  You have more fluid or blood coming from your incision.  Your incision feels warm to the touch.  You have pus or a bad smell coming from your incision.  You have red streaks above or below the incision site.  Get help right away if:  You have severe pain or bleeding.  You cannot eat or drink without vomiting.  You have a fever or chills.  You have redness that spreads quickly.  You have decreased urine output.  You become short of breath.  You have chest pain.  You cough up blood.  The affected area becomes numb or starts to tingle.  These symptoms may represent a serious problem that is an emergency. Do not wait to see if the symptoms will go away. Get medical help right away. Call your local emergency services (911 in the [place] Do not drive yourself to the hospital.  Summary  After this procedure, it is common to have fluid, blood, or pus coming from the surgery site.  Follow all home care instructions. You will be told how to take care of your incision, how to check for infection, and how to take medicines.  If you were prescribed an antibiotic medicine, take it as told by your health care provider. Do not stop taking the  antibiotic even if you start to feel better.  Contact a health care provider if you have increased redness, swelling, or pain around your incision. Get help right away if you have chest pain, you vomit, you cough up blood, or you have shortness of breath.  Keep all follow-up visits as told by your health care provider. This is important.  This information is not intended to replace advice given to you by your health care provider. Make sure you discuss any questions you have with your health care provider.  Document Revised: 03/23/2023 Document Reviewed: 09/29/2022  [employer] Patient Education © 2023 [employer]  means that you might use to hurt yourself (examples: pills, rope, extension cords, firearm)  Get professional help from the community (Mental Health, Substance Abuse, psychological counseling)  Do not be alone:Call your Safe Contact- someone whom you trust who will be there for you.  Call your local CRISIS HOTLINE 115-5714 or 212-071-1200  Call your local Children's Mobile Crisis Response Team [place] (444) 316-8555 or www.[employer].com  Call the toll free National Suicide Prevention Hotlines   National Suicide Prevention Lifeline 970-470-GMEH (7696)  National Hope Line Network 800-SUICIDE (729-3582)    I acknowledge receipt and understanding of these Home Care instructions.

## 2023-09-09 NOTE — PROGRESS NOTES
0932 pt in phase 2, vss, drsg to left LE has scant drainage, PACU nurse in report stated had min drainage as well.   Pt reports tolerable pain, wife picking up meds. Will continue to monitor.     1010 pt dressed, states ready to dc home, dc instructions reviewed pt aware to leave vac in place, and the extra sent home with pt and will bring to f/o appt.  All needs met, safe to dc home

## 2023-09-09 NOTE — OP REPORT
DATE: 9/9/2023    PREOPERATIVE DIAGNOSIS: 3 cm wound dehiscence    POSTOPERATIVE DIAGNOSIS: Same    PROCEDURE PERFORMED:  Irrigation and debridement and secondary closure of 3 cm wound dehiscence 2.  Incisional wound VAC placement                                                    SURGEON: Max Arias M.D.     ASSISTANT: None    ANESTHESIA: General    ESTIMATED BLOOD LOSS: 0 mL    INDICATIONS: The patient is a 48 y.o. male with a 3 wound dehiscence after leg surgery.  I discussed the risks and benefits of the procedure, including the risks of infection, wound healing complication, neurovascular injury, need for repeat irrigation and debridement and the medical risks of anesthesia including DVT, PE, MI, stroke, and death.  Benefits include eradication of infection and closure of wound.  Alternatives to surgery were also discussed, including non-operative management.  The patient signed the informed consent and the operative extremity was marked.      PROCEDURE:  The patient underwent anesthesia, and was positioned supine on the operating room table and all bony prominences were well padded.  Preoperative antibiotics were held until cultures could be obtained. Sequential compression devices were employed. The correct operative site was prepped and draped into a sterile field. A procedural pause was conducted to verify correct patient, correct extremity, presence of the surgeons initials on the operative extremity.    The wound dehiscence was debrided in excisional fashion with knife and rongeur of skin, subcutaneous tissue, and underlying muscle down to healthy tissue. Deep cultures were taken and sent to lab for analysis. The  wound was irrigated with 3 L of saline solution. The wound was closed in layers with 2-0 PDS and subcutaneous tissues and 2-0 Nylon in the skin.  An incisional wound VAC was applied under sterile conditions    The patient tolerated the procedure well. There were no apparent  complications. All sponge, needle, and instrument counts were correct on two separate occasions. They were awakened, extubated, and transferred to the recovery room in satisfactory condition.     Post-Operative Plan:    1.  The patient should be full weightbearing on their operative extremity.    2.  Leave incisional dressing on until clinic visit    ____________________________________   Max Arias M.D.   DD: 9/9/2023  9:04 AM

## 2023-09-10 LAB
FUNGUS SPEC FUNGUS STN: NORMAL
SIGNIFICANT IND 70042: NORMAL
SITE SITE: NORMAL
SOURCE SOURCE: NORMAL

## 2023-09-11 LAB
BACTERIA WND AEROBE CULT: ABNORMAL
BACTERIA WND AEROBE CULT: ABNORMAL
GRAM STN SPEC: ABNORMAL
SIGNIFICANT IND 70042: ABNORMAL
SITE SITE: ABNORMAL
SOURCE SOURCE: ABNORMAL

## 2023-09-12 LAB
BACTERIA SPEC ANAEROBE CULT: ABNORMAL
BACTERIA SPEC ANAEROBE CULT: ABNORMAL
SIGNIFICANT IND 70042: ABNORMAL
SITE SITE: ABNORMAL
SOURCE SOURCE: ABNORMAL

## 2023-09-22 ENCOUNTER — HOSPITAL ENCOUNTER (EMERGENCY)
Facility: MEDICAL CENTER | Age: 48
End: 2023-09-22
Attending: EMERGENCY MEDICINE
Payer: COMMERCIAL

## 2023-09-22 ENCOUNTER — APPOINTMENT (OUTPATIENT)
Dept: RADIOLOGY | Facility: MEDICAL CENTER | Age: 48
End: 2023-09-22
Attending: EMERGENCY MEDICINE
Payer: COMMERCIAL

## 2023-09-22 VITALS
WEIGHT: 232.81 LBS | OXYGEN SATURATION: 95 % | TEMPERATURE: 97.8 F | HEART RATE: 72 BPM | DIASTOLIC BLOOD PRESSURE: 85 MMHG | HEIGHT: 71 IN | SYSTOLIC BLOOD PRESSURE: 140 MMHG | BODY MASS INDEX: 32.59 KG/M2 | RESPIRATION RATE: 18 BRPM

## 2023-09-22 DIAGNOSIS — T14.8XXA WOUND INFECTION: ICD-10-CM

## 2023-09-22 DIAGNOSIS — L08.9 WOUND INFECTION: ICD-10-CM

## 2023-09-22 LAB
ALBUMIN SERPL BCP-MCNC: 4.3 G/DL (ref 3.2–4.9)
ALBUMIN/GLOB SERPL: 1.3 G/DL
ALP SERPL-CCNC: 67 U/L (ref 30–99)
ALT SERPL-CCNC: 51 U/L (ref 2–50)
ANION GAP SERPL CALC-SCNC: 11 MMOL/L (ref 7–16)
AST SERPL-CCNC: 30 U/L (ref 12–45)
BASOPHILS # BLD AUTO: 0.4 % (ref 0–1.8)
BASOPHILS # BLD: 0.02 K/UL (ref 0–0.12)
BILIRUB SERPL-MCNC: 0.3 MG/DL (ref 0.1–1.5)
BUN SERPL-MCNC: 12 MG/DL (ref 8–22)
CALCIUM ALBUM COR SERPL-MCNC: 8.8 MG/DL (ref 8.5–10.5)
CALCIUM SERPL-MCNC: 9 MG/DL (ref 8.4–10.2)
CHLORIDE SERPL-SCNC: 102 MMOL/L (ref 96–112)
CO2 SERPL-SCNC: 23 MMOL/L (ref 20–33)
CREAT SERPL-MCNC: 0.81 MG/DL (ref 0.5–1.4)
CRP SERPL HS-MCNC: <0.3 MG/DL (ref 0–0.75)
EOSINOPHIL # BLD AUTO: 0.04 K/UL (ref 0–0.51)
EOSINOPHIL NFR BLD: 0.7 % (ref 0–6.9)
ERYTHROCYTE [DISTWIDTH] IN BLOOD BY AUTOMATED COUNT: 40.4 FL (ref 35.9–50)
ERYTHROCYTE [SEDIMENTATION RATE] IN BLOOD BY WESTERGREN METHOD: 5 MM/HOUR (ref 0–20)
GFR SERPLBLD CREATININE-BSD FMLA CKD-EPI: 109 ML/MIN/1.73 M 2
GLOBULIN SER CALC-MCNC: 3.2 G/DL (ref 1.9–3.5)
GLUCOSE SERPL-MCNC: 94 MG/DL (ref 65–99)
HCT VFR BLD AUTO: 47.1 % (ref 42–52)
HGB BLD-MCNC: 16.4 G/DL (ref 14–18)
IMM GRANULOCYTES # BLD AUTO: 0.02 K/UL (ref 0–0.11)
IMM GRANULOCYTES NFR BLD AUTO: 0.4 % (ref 0–0.9)
LACTATE SERPL-SCNC: 1.2 MMOL/L (ref 0.5–2)
LYMPHOCYTES # BLD AUTO: 1.23 K/UL (ref 1–4.8)
LYMPHOCYTES NFR BLD: 22.1 % (ref 22–41)
MCH RBC QN AUTO: 33.1 PG (ref 27–33)
MCHC RBC AUTO-ENTMCNC: 34.8 G/DL (ref 32.3–36.5)
MCV RBC AUTO: 95 FL (ref 81.4–97.8)
MONOCYTES # BLD AUTO: 0.41 K/UL (ref 0–0.85)
MONOCYTES NFR BLD AUTO: 7.4 % (ref 0–13.4)
NEUTROPHILS # BLD AUTO: 3.85 K/UL (ref 1.82–7.42)
NEUTROPHILS NFR BLD: 69 % (ref 44–72)
NRBC # BLD AUTO: 0 K/UL
NRBC BLD-RTO: 0 /100 WBC (ref 0–0.2)
PLATELET # BLD AUTO: 244 K/UL (ref 164–446)
PMV BLD AUTO: 8.8 FL (ref 9–12.9)
POTASSIUM SERPL-SCNC: 4.2 MMOL/L (ref 3.6–5.5)
PROT SERPL-MCNC: 7.5 G/DL (ref 6–8.2)
RBC # BLD AUTO: 4.96 M/UL (ref 4.7–6.1)
SODIUM SERPL-SCNC: 136 MMOL/L (ref 135–145)
WBC # BLD AUTO: 5.6 K/UL (ref 4.8–10.8)

## 2023-09-22 PROCEDURE — A9579 GAD-BASE MR CONTRAST NOS,1ML: HCPCS | Performed by: EMERGENCY MEDICINE

## 2023-09-22 PROCEDURE — 700117 HCHG RX CONTRAST REV CODE 255: Performed by: EMERGENCY MEDICINE

## 2023-09-22 PROCEDURE — 73720 MRI LWR EXTREMITY W/O&W/DYE: CPT | Mod: LT

## 2023-09-22 PROCEDURE — 86140 C-REACTIVE PROTEIN: CPT

## 2023-09-22 PROCEDURE — 87040 BLOOD CULTURE FOR BACTERIA: CPT | Mod: 91

## 2023-09-22 PROCEDURE — 85652 RBC SED RATE AUTOMATED: CPT

## 2023-09-22 PROCEDURE — 99284 EMERGENCY DEPT VISIT MOD MDM: CPT

## 2023-09-22 PROCEDURE — 96374 THER/PROPH/DIAG INJ IV PUSH: CPT

## 2023-09-22 PROCEDURE — 83605 ASSAY OF LACTIC ACID: CPT

## 2023-09-22 PROCEDURE — 80053 COMPREHEN METABOLIC PANEL: CPT

## 2023-09-22 PROCEDURE — 36415 COLL VENOUS BLD VENIPUNCTURE: CPT

## 2023-09-22 PROCEDURE — 700111 HCHG RX REV CODE 636 W/ 250 OVERRIDE (IP): Mod: JZ | Performed by: EMERGENCY MEDICINE

## 2023-09-22 PROCEDURE — 96375 TX/PRO/DX INJ NEW DRUG ADDON: CPT

## 2023-09-22 PROCEDURE — 85025 COMPLETE CBC W/AUTO DIFF WBC: CPT

## 2023-09-22 RX ORDER — SULFAMETHOXAZOLE AND TRIMETHOPRIM 800; 160 MG/1; MG/1
1 TABLET ORAL 2 TIMES DAILY
Status: SHIPPED | COMMUNITY
Start: 2023-09-15 | End: 2023-12-06

## 2023-09-22 RX ADMIN — FENTANYL CITRATE 50 MCG: 50 INJECTION, SOLUTION INTRAMUSCULAR; INTRAVENOUS at 18:03

## 2023-09-22 RX ADMIN — GADOTERIDOL 20 ML: 279.3 INJECTION, SOLUTION INTRAVENOUS at 16:05

## 2023-09-22 ASSESSMENT — PAIN DESCRIPTION - PAIN TYPE
TYPE: ACUTE PAIN
TYPE: ACUTE PAIN

## 2023-09-22 ASSESSMENT — FIBROSIS 4 INDEX: FIB4 SCORE: 2.28

## 2023-09-22 NOTE — ED NOTES
Med rec completed per pt   Allergies reviewed     Pt states that he has been on multiple courses of Bactrim DS in the last month   Pt started a 10 day course of Bactrim DS on 9/15/2023    Pt was just prescribed a 10 day course of Doxycycline that he has not yet started

## 2023-09-22 NOTE — ED PROVIDER NOTES
ED Provider Note    CHIEF COMPLAINT  Chief Complaint   Patient presents with    Wound Check     L lower leg. Reports he had wound vac removed recently and has been on oral antibx x approx 1mo. Concerned for infection. Denies fevers/chills or vomiting.        EXTERNAL RECORDS REVIEWED  Outpatient Notes and orthopedic clinic note from Dr. Buenrostro and on 9/8/2023 reviewed.  Visit for wound dehiscence.    HPI/ROS  LIMITATION TO HISTORY   Select: : None  OUTSIDE HISTORIAN(S):  Significant other at bedside    Jarod Fields is a 48 y.o. male who presents to department with persistent wound to left leg.  He explains that he has had a complex surgical course with ongoing left leg wound.  Multiple orthopedic surgeries most recently being roughly 1 week ago.  Primarily followed by Dr. Collins and at [facility] however most recent surgery was completed by Dr. Cuevas.  He states that he recently had wound VAC taken off and has been on Bactrim for nearly 1 month.  At office clinic this week he was prescribed doxycycline although he has not yet started this.  He also had outpatient orthopedic follow-up today which ultimately prompted his visit here to the ER at their direction for further care and work-up as the patient continues to be concerned over his infectious findings with recurrent erythema and persistently open wounds to the left mid leg.    Denies any fever chills.  Otherwise systemically feels well.    PAST MEDICAL HISTORY   has a past medical history of CRPS (complex regional pain syndrome type I) (2013), Nerve pain, and Pain (02/18/2015).    SURGICAL HISTORY   has a past surgical history that includes inject nerv blck,stellate ganglion (11/26/2014); inject nerv blck,stellate ganglion (12/03/2014); inject nerv blck,stellate ganglion (12/10/2014); percut implnt neuroelect,epidural (02/04/2015); percut implnt neuroelect,epidural (02/04/2015); spinal cord stimulator (02/19/2015); gastroscopy with biopsy  "(06/22/2013); other orthopedic surgery (2003); decompress ant/lat+post leg cmpart (Left, 06/29/2023); incision and drainage orthopedic (06/29/2023); fasciotomy (Left, 07/02/2023); debride skin subq tissue first 20 sq cm (Left, 9/9/2023); and secd clos surg wnd exten/complic (Left, 9/9/2023).    FAMILY HISTORY  Family History   Problem Relation Age of Onset    Hypertension Other     Cancer Other        SOCIAL HISTORY  Social History     Tobacco Use    Smoking status: Former     Current packs/day: 0.00     Average packs/day: 0.5 packs/day for 31.0 years (15.5 ttl pk-yrs)     Types: Cigarettes     Start date: 1989     Quit date: 2020     Years since quitting: 3.7     Passive exposure: Past    Smokeless tobacco: Never    Tobacco comments:     1/2 ppd since 13yo, on chantix   Vaping Use    Vaping Use: Never used   Substance and Sexual Activity    Alcohol use: Not Currently     Comment: EVERY OTHER MONTH    Drug use: Not Currently     Comment: denies    Sexual activity: Yes     Partners: Female       CURRENT MEDICATIONS  Home Medications       Reviewed by Estephania Mora PhT (Pharmacy Tech) on 09/22/23 at 1344  Med List Status: Complete     Medication Last Dose Status   doxycycline (VIBRAMYCIN) 100 MG Tab NOT YET STARTED Active   sulfamethoxazole-trimethoprim (BACTRIM DS) 800-160 MG tablet 9/22/2023 Active                    ALLERGIES  No Known Allergies    PHYSICAL EXAM  VITAL SIGNS: BP (!) 140/85   Pulse 72   Temp 36.6 °C (97.8 °F) (Temporal)   Resp 18   Ht 1.803 m (5' 11\")   Wt 106 kg (232 lb 12.9 oz)   SpO2 95%   BMI 32.47 kg/m²      Pulse ox interpretation: I interpret this pulse ox as normal.  Constitutional: Alert in no apparent distress.  HENT: No signs of trauma, Bilateral external ears normal, Nose normal.   Eyes: Pupils are equal and reactive  Neck: Normal range of motion, No tenderness, Supple  Cardiovascular: Regular rate and rhythm, no murmurs.   Thorax & Lungs: Normal breath sounds, No respiratory " distress, No wheezing, No chest tenderness.   Skin: Warm, Dry    Extremities: Intact distal pulses  LLE:     Musculoskeletal: Good range of motion in all major joints. No tenderness to palpation or major deformities noted.   Neurologic: Alert , Normal motor function, Normal sensory function, No focal deficits noted.   Psychiatric: Affect normal, Judgment normal, Mood normal.       DIAGNOSTIC STUDIES / PROCEDURES    LABS  Results for orders placed or performed during the hospital encounter of 09/22/23   CBC With Differential   Result Value Ref Range    WBC 5.6 4.8 - 10.8 K/uL    RBC 4.96 4.70 - 6.10 M/uL    Hemoglobin 16.4 14.0 - 18.0 g/dL    Hematocrit 47.1 42.0 - 52.0 %    MCV 95.0 81.4 - 97.8 fL    MCH 33.1 (H) 27.0 - 33.0 pg    MCHC 34.8 32.3 - 36.5 g/dL    RDW 40.4 35.9 - 50.0 fL    Platelet Count 244 164 - 446 K/uL    MPV 8.8 (L) 9.0 - 12.9 fL    Neutrophils-Polys 69.00 44.00 - 72.00 %    Lymphocytes 22.10 22.00 - 41.00 %    Monocytes 7.40 0.00 - 13.40 %    Eosinophils 0.70 0.00 - 6.90 %    Basophils 0.40 0.00 - 1.80 %    Immature Granulocytes 0.40 0.00 - 0.90 %    Nucleated RBC 0.00 0.00 - 0.20 /100 WBC    Neutrophils (Absolute) 3.85 1.82 - 7.42 K/uL    Lymphs (Absolute) 1.23 1.00 - 4.80 K/uL    Monos (Absolute) 0.41 0.00 - 0.85 K/uL    Eos (Absolute) 0.04 0.00 - 0.51 K/uL    Baso (Absolute) 0.02 0.00 - 0.12 K/uL    Immature Granulocytes (abs) 0.02 0.00 - 0.11 K/uL    NRBC (Absolute) 0.00 K/uL   Comp Metabolic Panel   Result Value Ref Range    Sodium 136 135 - 145 mmol/L    Potassium 4.2 3.6 - 5.5 mmol/L    Chloride 102 96 - 112 mmol/L    Co2 23 20 - 33 mmol/L    Anion Gap 11.0 7.0 - 16.0    Glucose 94 65 - 99 mg/dL    Bun 12 8 - 22 mg/dL    Creatinine 0.81 0.50 - 1.40 mg/dL    Calcium 9.0 8.4 - 10.2 mg/dL    Correct Calcium 8.8 8.5 - 10.5 mg/dL    AST(SGOT) 30 12 - 45 U/L    ALT(SGPT) 51 (H) 2 - 50 U/L    Alkaline Phosphatase 67 30 - 99 U/L    Total Bilirubin 0.3 0.1 - 1.5 mg/dL    Albumin 4.3 3.2 - 4.9 g/dL     Total Protein 7.5 6.0 - 8.2 g/dL    Globulin 3.2 1.9 - 3.5 g/dL    A-G Ratio 1.3 g/dL   Lactic Acid   Result Value Ref Range    Lactic Acid 1.2 0.5 - 2.0 mmol/L   Sed Rate   Result Value Ref Range    Sed Rate Westergren 5 0 - 20 mm/hour   C Reactive Protein Quantitative (Non-Cardiac)   Result Value Ref Range    Stat C-Reactive Protein <0.30 0.00 - 0.75 mg/dL   ESTIMATED GFR   Result Value Ref Range    GFR (CKD-EPI) 109 >60 mL/min/1.73 m 2         RADIOLOGY    Radiologist interpretation:   [id number] & W/O LEFT   Final Result      1.  Cellulitis involving the lower leg anteriorly extending medially and laterally. There is atrophy and myositis of the underlying tibialis anterior, extensor digitorum longus and extensor hallucis longus muscles. No evidence of focal abscess.      2.  No evidence of osteomyelitis of the tibia or fibula.      3.  Old healed fractures of the mid tibial and fibular diaphyses.      4.  Old postsurgical changes of the tibia.            COURSE & MEDICAL DECISION MAKING    ED Observation Status? Yes; I am placing the patient in to an observation status due to a diagnostic uncertainty as well as therapeutic intensity. Patient placed in observation status at 1330 PM, 9/22/2023.     Observation plan is as follows: Patient presenting emerged part with concern over persistent infection of left leg postoperatively.  We will proceed with infectious screening to include blood work, blood cultures and will additionally add MRI imaging for evaluation of infectious etiology to include osteomyelitis.    Upon Reevaluation, the patient's condition has: Improved; and will be discharged.    Patient discharged from ED Observation status at 1800 (Time) 9/22/2023 (Date).     INITIAL ASSESSMENT, COURSE AND PLAN  Care Narrative: Please see observation plan as above    DISPOSITION AND DISCUSSIONS  I have discussed management of the patient with the following physicians and DENNY's: Dr. Norris on-call for  [facility]    Discussion of management with other [facility] or appropriate source(s): For medication verificationPharmacy        Escalation of care considered, and ultimately not performed:acute inpatient care management, however at this time, the patient is most appropriate for outpatient management    Barriers to care at this time, including but not limited to:  None t .     48-year-old male presenting to emergency room with the above presentation.  Fortunately today's work-up is reassuring.  The patient was justTransition from Bactrim to doxycycline which I will have him continue this clinical course.  At this point no further complicating factors to require inpatient stay.  Again I have discussed the case with the renal orthopedic team on-call which has also had side discussion with Dr. Luna directly.  At this point the patient is understanding of ongoing home care, bandaging and return precautions    FINAL DIAGNOSIS  1. Wound infection           Electronically signed by: Oumar Martinez M.D., 9/22/2023 1:30 PM

## 2023-09-27 LAB
BACTERIA BLD CULT: NORMAL
BACTERIA BLD CULT: NORMAL
SIGNIFICANT IND 70042: NORMAL
SIGNIFICANT IND 70042: NORMAL
SITE SITE: NORMAL
SITE SITE: NORMAL
SOURCE SOURCE: NORMAL
SOURCE SOURCE: NORMAL

## 2023-10-12 LAB
FUNGUS SPEC CULT: NORMAL
FUNGUS SPEC FUNGUS STN: NORMAL
SIGNIFICANT IND 70042: NORMAL
SITE SITE: NORMAL
SOURCE SOURCE: NORMAL

## 2023-10-26 ENCOUNTER — HOSPITAL ENCOUNTER (OUTPATIENT)
Facility: MEDICAL CENTER | Age: 48
End: 2023-10-26
Attending: STUDENT IN AN ORGANIZED HEALTH CARE EDUCATION/TRAINING PROGRAM
Payer: COMMERCIAL

## 2023-10-26 LAB
ALBUMIN SERPL BCP-MCNC: 4.9 G/DL (ref 3.2–4.9)
ALBUMIN/GLOB SERPL: 1.6 G/DL
ALP SERPL-CCNC: 66 U/L (ref 30–99)
ALT SERPL-CCNC: 48 U/L (ref 2–50)
ANION GAP SERPL CALC-SCNC: 12 MMOL/L (ref 7–16)
AST SERPL-CCNC: 41 U/L (ref 12–45)
BASOPHILS # BLD AUTO: 0.3 % (ref 0–1.8)
BASOPHILS # BLD: 0.02 K/UL (ref 0–0.12)
BILIRUB SERPL-MCNC: 0.6 MG/DL (ref 0.1–1.5)
BUN SERPL-MCNC: 15 MG/DL (ref 8–22)
CALCIUM ALBUM COR SERPL-MCNC: 8.9 MG/DL (ref 8.5–10.5)
CALCIUM SERPL-MCNC: 9.6 MG/DL (ref 8.5–10.5)
CHLORIDE SERPL-SCNC: 102 MMOL/L (ref 96–112)
CO2 SERPL-SCNC: 22 MMOL/L (ref 20–33)
CREAT SERPL-MCNC: 0.88 MG/DL (ref 0.5–1.4)
CRP SERPL HS-MCNC: 0.74 MG/DL (ref 0–0.75)
EOSINOPHIL # BLD AUTO: 0.07 K/UL (ref 0–0.51)
EOSINOPHIL NFR BLD: 1 % (ref 0–6.9)
ERYTHROCYTE [DISTWIDTH] IN BLOOD BY AUTOMATED COUNT: 43.4 FL (ref 35.9–50)
ERYTHROCYTE [SEDIMENTATION RATE] IN BLOOD BY WESTERGREN METHOD: 14 MM/HOUR (ref 0–20)
GFR SERPLBLD CREATININE-BSD FMLA CKD-EPI: 106 ML/MIN/1.73 M 2
GLOBULIN SER CALC-MCNC: 3.1 G/DL (ref 1.9–3.5)
GLUCOSE SERPL-MCNC: 96 MG/DL (ref 65–99)
HCT VFR BLD AUTO: 45.7 % (ref 42–52)
HGB BLD-MCNC: 16 G/DL (ref 14–18)
IMM GRANULOCYTES # BLD AUTO: 0.03 K/UL (ref 0–0.11)
IMM GRANULOCYTES NFR BLD AUTO: 0.4 % (ref 0–0.9)
LYMPHOCYTES # BLD AUTO: 1.35 K/UL (ref 1–4.8)
LYMPHOCYTES NFR BLD: 19.9 % (ref 22–41)
MCH RBC QN AUTO: 32.7 PG (ref 27–33)
MCHC RBC AUTO-ENTMCNC: 35 G/DL (ref 32.3–36.5)
MCV RBC AUTO: 93.5 FL (ref 81.4–97.8)
MONOCYTES # BLD AUTO: 0.66 K/UL (ref 0–0.85)
MONOCYTES NFR BLD AUTO: 9.7 % (ref 0–13.4)
NEUTROPHILS # BLD AUTO: 4.65 K/UL (ref 1.82–7.42)
NEUTROPHILS NFR BLD: 68.7 % (ref 44–72)
NRBC # BLD AUTO: 0 K/UL
NRBC BLD-RTO: 0 /100 WBC (ref 0–0.2)
PLATELET # BLD AUTO: 292 K/UL (ref 164–446)
PMV BLD AUTO: 9 FL (ref 9–12.9)
POTASSIUM SERPL-SCNC: 4.4 MMOL/L (ref 3.6–5.5)
PROT SERPL-MCNC: 8 G/DL (ref 6–8.2)
RBC # BLD AUTO: 4.89 M/UL (ref 4.7–6.1)
SODIUM SERPL-SCNC: 136 MMOL/L (ref 135–145)
WBC # BLD AUTO: 6.8 K/UL (ref 4.8–10.8)

## 2023-10-26 PROCEDURE — 80053 COMPREHEN METABOLIC PANEL: CPT

## 2023-10-26 PROCEDURE — 85025 COMPLETE CBC W/AUTO DIFF WBC: CPT

## 2023-10-26 PROCEDURE — 85652 RBC SED RATE AUTOMATED: CPT

## 2023-10-26 PROCEDURE — 86140 C-REACTIVE PROTEIN: CPT

## 2023-11-06 ENCOUNTER — HOSPITAL ENCOUNTER (OUTPATIENT)
Facility: MEDICAL CENTER | Age: 48
End: 2023-11-06
Attending: PHYSICIAN ASSISTANT
Payer: COMMERCIAL

## 2023-11-06 LAB
ALBUMIN SERPL BCP-MCNC: 4.7 G/DL (ref 3.2–4.9)
ALBUMIN/GLOB SERPL: 1.5 G/DL
ALP SERPL-CCNC: 61 U/L (ref 30–99)
ALT SERPL-CCNC: 16 U/L (ref 2–50)
ANION GAP SERPL CALC-SCNC: 12 MMOL/L (ref 7–16)
AST SERPL-CCNC: 25 U/L (ref 12–45)
BASOPHILS # BLD AUTO: 0.4 % (ref 0–1.8)
BASOPHILS # BLD: 0.02 K/UL (ref 0–0.12)
BILIRUB SERPL-MCNC: 0.4 MG/DL (ref 0.1–1.5)
BUN SERPL-MCNC: 11 MG/DL (ref 8–22)
CALCIUM ALBUM COR SERPL-MCNC: 8.7 MG/DL (ref 8.5–10.5)
CALCIUM SERPL-MCNC: 9.3 MG/DL (ref 8.5–10.5)
CHLORIDE SERPL-SCNC: 104 MMOL/L (ref 96–112)
CO2 SERPL-SCNC: 24 MMOL/L (ref 20–33)
CREAT SERPL-MCNC: 0.73 MG/DL (ref 0.5–1.4)
CRP SERPL HS-MCNC: 1.3 MG/L (ref 0–3)
EOSINOPHIL # BLD AUTO: 0.06 K/UL (ref 0–0.51)
EOSINOPHIL NFR BLD: 1.3 % (ref 0–6.9)
ERYTHROCYTE [DISTWIDTH] IN BLOOD BY AUTOMATED COUNT: 45.6 FL (ref 35.9–50)
ERYTHROCYTE [SEDIMENTATION RATE] IN BLOOD BY WESTERGREN METHOD: 13 MM/HOUR (ref 0–20)
GFR SERPLBLD CREATININE-BSD FMLA CKD-EPI: 112 ML/MIN/1.73 M 2
GLOBULIN SER CALC-MCNC: 3.2 G/DL (ref 1.9–3.5)
GLUCOSE SERPL-MCNC: 85 MG/DL (ref 65–99)
HCT VFR BLD AUTO: 44.2 % (ref 42–52)
HGB BLD-MCNC: 15 G/DL (ref 14–18)
IMM GRANULOCYTES # BLD AUTO: 0.02 K/UL (ref 0–0.11)
IMM GRANULOCYTES NFR BLD AUTO: 0.4 % (ref 0–0.9)
LYMPHOCYTES # BLD AUTO: 1.1 K/UL (ref 1–4.8)
LYMPHOCYTES NFR BLD: 24.1 % (ref 22–41)
MCH RBC QN AUTO: 32.3 PG (ref 27–33)
MCHC RBC AUTO-ENTMCNC: 33.9 G/DL (ref 32.3–36.5)
MCV RBC AUTO: 95.1 FL (ref 81.4–97.8)
MONOCYTES # BLD AUTO: 0.37 K/UL (ref 0–0.85)
MONOCYTES NFR BLD AUTO: 8.1 % (ref 0–13.4)
NEUTROPHILS # BLD AUTO: 2.99 K/UL (ref 1.82–7.42)
NEUTROPHILS NFR BLD: 65.7 % (ref 44–72)
NRBC # BLD AUTO: 0 K/UL
NRBC BLD-RTO: 0 /100 WBC (ref 0–0.2)
PLATELET # BLD AUTO: 294 K/UL (ref 164–446)
PMV BLD AUTO: 9.3 FL (ref 9–12.9)
POTASSIUM SERPL-SCNC: 4 MMOL/L (ref 3.6–5.5)
PROT SERPL-MCNC: 7.9 G/DL (ref 6–8.2)
RBC # BLD AUTO: 4.65 M/UL (ref 4.7–6.1)
SODIUM SERPL-SCNC: 140 MMOL/L (ref 135–145)
WBC # BLD AUTO: 4.6 K/UL (ref 4.8–10.8)

## 2023-11-06 PROCEDURE — 86141 C-REACTIVE PROTEIN HS: CPT

## 2023-11-06 PROCEDURE — 85025 COMPLETE CBC W/AUTO DIFF WBC: CPT

## 2023-11-06 PROCEDURE — 80053 COMPREHEN METABOLIC PANEL: CPT

## 2023-11-06 PROCEDURE — 85652 RBC SED RATE AUTOMATED: CPT

## 2023-11-07 ENCOUNTER — HOSPITAL ENCOUNTER (OUTPATIENT)
Facility: MEDICAL CENTER | Age: 48
End: 2023-11-07
Attending: ORTHOPAEDIC SURGERY
Payer: COMMERCIAL

## 2023-11-07 LAB
GRAM STN SPEC: NORMAL
SIGNIFICANT IND 70042: NORMAL
SITE SITE: NORMAL
SOURCE SOURCE: NORMAL

## 2023-11-07 PROCEDURE — 87076 CULTURE ANAEROBE IDENT EACH: CPT

## 2023-11-07 PROCEDURE — 87186 SC STD MICRODIL/AGAR DIL: CPT

## 2023-11-07 PROCEDURE — 87075 CULTR BACTERIA EXCEPT BLOOD: CPT

## 2023-11-07 PROCEDURE — 87205 SMEAR GRAM STAIN: CPT

## 2023-11-07 PROCEDURE — 87070 CULTURE OTHR SPECIMN AEROBIC: CPT

## 2023-11-07 PROCEDURE — 87077 CULTURE AEROBIC IDENTIFY: CPT

## 2023-11-10 LAB
BACTERIA SPEC ANAEROBE CULT: ABNORMAL
BACTERIA SPEC ANAEROBE CULT: ABNORMAL
BACTERIA WND AEROBE CULT: ABNORMAL
GRAM STN SPEC: ABNORMAL
SIGNIFICANT IND 70042: ABNORMAL
SIGNIFICANT IND 70042: ABNORMAL
SITE SITE: ABNORMAL
SITE SITE: ABNORMAL
SOURCE SOURCE: ABNORMAL
SOURCE SOURCE: ABNORMAL

## 2023-11-13 ENCOUNTER — HOSPITAL ENCOUNTER (OUTPATIENT)
Facility: MEDICAL CENTER | Age: 48
End: 2023-11-13
Attending: PHYSICIAN ASSISTANT
Payer: COMMERCIAL

## 2023-11-13 LAB
ALBUMIN SERPL BCP-MCNC: 4.8 G/DL (ref 3.2–4.9)
ALBUMIN/GLOB SERPL: 1.8 G/DL
ALP SERPL-CCNC: 59 U/L (ref 30–99)
ALT SERPL-CCNC: 12 U/L (ref 2–50)
ANION GAP SERPL CALC-SCNC: 13 MMOL/L (ref 7–16)
AST SERPL-CCNC: 33 U/L (ref 12–45)
BASOPHILS # BLD AUTO: 0.4 % (ref 0–1.8)
BASOPHILS # BLD: 0.02 K/UL (ref 0–0.12)
BILIRUB SERPL-MCNC: 0.6 MG/DL (ref 0.1–1.5)
BUN SERPL-MCNC: 11 MG/DL (ref 8–22)
CALCIUM ALBUM COR SERPL-MCNC: 9 MG/DL (ref 8.5–10.5)
CALCIUM SERPL-MCNC: 9.6 MG/DL (ref 8.5–10.5)
CHLORIDE SERPL-SCNC: 102 MMOL/L (ref 96–112)
CO2 SERPL-SCNC: 22 MMOL/L (ref 20–33)
CREAT SERPL-MCNC: 0.77 MG/DL (ref 0.5–1.4)
CRP SERPL HS-MCNC: <0.3 MG/DL (ref 0–0.75)
EOSINOPHIL # BLD AUTO: 0.06 K/UL (ref 0–0.51)
EOSINOPHIL NFR BLD: 1.3 % (ref 0–6.9)
ERYTHROCYTE [DISTWIDTH] IN BLOOD BY AUTOMATED COUNT: 45.1 FL (ref 35.9–50)
ERYTHROCYTE [SEDIMENTATION RATE] IN BLOOD BY WESTERGREN METHOD: 19 MM/HOUR (ref 0–20)
GFR SERPLBLD CREATININE-BSD FMLA CKD-EPI: 111 ML/MIN/1.73 M 2
GLOBULIN SER CALC-MCNC: 2.7 G/DL (ref 1.9–3.5)
GLUCOSE SERPL-MCNC: 70 MG/DL (ref 65–99)
HCT VFR BLD AUTO: 44.1 % (ref 42–52)
HGB BLD-MCNC: 15.4 G/DL (ref 14–18)
IMM GRANULOCYTES # BLD AUTO: 0.02 K/UL (ref 0–0.11)
IMM GRANULOCYTES NFR BLD AUTO: 0.4 % (ref 0–0.9)
LYMPHOCYTES # BLD AUTO: 1.28 K/UL (ref 1–4.8)
LYMPHOCYTES NFR BLD: 27.2 % (ref 22–41)
MCH RBC QN AUTO: 33.2 PG (ref 27–33)
MCHC RBC AUTO-ENTMCNC: 34.9 G/DL (ref 32.3–36.5)
MCV RBC AUTO: 95 FL (ref 81.4–97.8)
MONOCYTES # BLD AUTO: 0.37 K/UL (ref 0–0.85)
MONOCYTES NFR BLD AUTO: 7.9 % (ref 0–13.4)
NEUTROPHILS # BLD AUTO: 2.96 K/UL (ref 1.82–7.42)
NEUTROPHILS NFR BLD: 62.8 % (ref 44–72)
NRBC # BLD AUTO: 0 K/UL
NRBC BLD-RTO: 0 /100 WBC (ref 0–0.2)
PLATELET # BLD AUTO: 240 K/UL (ref 164–446)
PMV BLD AUTO: 9.7 FL (ref 9–12.9)
POTASSIUM SERPL-SCNC: 3.8 MMOL/L (ref 3.6–5.5)
PROT SERPL-MCNC: 7.5 G/DL (ref 6–8.2)
RBC # BLD AUTO: 4.64 M/UL (ref 4.7–6.1)
SODIUM SERPL-SCNC: 137 MMOL/L (ref 135–145)
WBC # BLD AUTO: 4.7 K/UL (ref 4.8–10.8)

## 2023-11-13 PROCEDURE — 85025 COMPLETE CBC W/AUTO DIFF WBC: CPT

## 2023-11-13 PROCEDURE — 85652 RBC SED RATE AUTOMATED: CPT

## 2023-11-13 PROCEDURE — 80053 COMPREHEN METABOLIC PANEL: CPT

## 2023-11-13 PROCEDURE — 86140 C-REACTIVE PROTEIN: CPT

## 2023-11-27 ENCOUNTER — HOSPITAL ENCOUNTER (OUTPATIENT)
Facility: MEDICAL CENTER | Age: 48
End: 2023-11-27
Attending: PHYSICIAN ASSISTANT
Payer: COMMERCIAL

## 2023-11-27 LAB
ALBUMIN SERPL BCP-MCNC: 4.4 G/DL (ref 3.2–4.9)
ALBUMIN/GLOB SERPL: 1.8 G/DL
ALP SERPL-CCNC: 55 U/L (ref 30–99)
ALT SERPL-CCNC: 44 U/L (ref 2–50)
ANION GAP SERPL CALC-SCNC: 13 MMOL/L (ref 7–16)
AST SERPL-CCNC: 34 U/L (ref 12–45)
BILIRUB SERPL-MCNC: 0.4 MG/DL (ref 0.1–1.5)
BUN SERPL-MCNC: 11 MG/DL (ref 8–22)
CALCIUM ALBUM COR SERPL-MCNC: 8.7 MG/DL (ref 8.5–10.5)
CALCIUM SERPL-MCNC: 9 MG/DL (ref 8.5–10.5)
CHLORIDE SERPL-SCNC: 107 MMOL/L (ref 96–112)
CO2 SERPL-SCNC: 24 MMOL/L (ref 20–33)
CREAT SERPL-MCNC: 0.74 MG/DL (ref 0.5–1.4)
CRP SERPL HS-MCNC: <0.3 MG/DL (ref 0–0.75)
GFR SERPLBLD CREATININE-BSD FMLA CKD-EPI: 112 ML/MIN/1.73 M 2
GLOBULIN SER CALC-MCNC: 2.5 G/DL (ref 1.9–3.5)
GLUCOSE SERPL-MCNC: 75 MG/DL (ref 65–99)
POTASSIUM SERPL-SCNC: 4.1 MMOL/L (ref 3.6–5.5)
PROT SERPL-MCNC: 6.9 G/DL (ref 6–8.2)
SODIUM SERPL-SCNC: 144 MMOL/L (ref 135–145)

## 2023-11-27 PROCEDURE — 80053 COMPREHEN METABOLIC PANEL: CPT

## 2023-11-27 PROCEDURE — 85025 COMPLETE CBC W/AUTO DIFF WBC: CPT

## 2023-11-27 PROCEDURE — 85652 RBC SED RATE AUTOMATED: CPT

## 2023-11-27 PROCEDURE — 86140 C-REACTIVE PROTEIN: CPT

## 2023-12-04 ENCOUNTER — HOSPITAL ENCOUNTER (OUTPATIENT)
Facility: MEDICAL CENTER | Age: 48
End: 2023-12-04
Attending: PHYSICIAN ASSISTANT
Payer: COMMERCIAL

## 2023-12-04 LAB
ALBUMIN SERPL BCP-MCNC: 4.2 G/DL (ref 3.2–4.9)
ALBUMIN/GLOB SERPL: 1.7 G/DL
ALP SERPL-CCNC: 79 U/L (ref 30–99)
ALT SERPL-CCNC: 28 U/L (ref 2–50)
ANION GAP SERPL CALC-SCNC: 12 MMOL/L (ref 7–16)
AST SERPL-CCNC: 23 U/L (ref 12–45)
BILIRUB SERPL-MCNC: 0.2 MG/DL (ref 0.1–1.5)
BUN SERPL-MCNC: 11 MG/DL (ref 8–22)
CALCIUM ALBUM COR SERPL-MCNC: 8.5 MG/DL (ref 8.5–10.5)
CALCIUM SERPL-MCNC: 8.7 MG/DL (ref 8.5–10.5)
CHLORIDE SERPL-SCNC: 111 MMOL/L (ref 96–112)
CO2 SERPL-SCNC: 22 MMOL/L (ref 20–33)
CREAT SERPL-MCNC: 0.58 MG/DL (ref 0.5–1.4)
CRP SERPL HS-MCNC: <0.3 MG/DL (ref 0–0.75)
GFR SERPLBLD CREATININE-BSD FMLA CKD-EPI: 120 ML/MIN/1.73 M 2
GLOBULIN SER CALC-MCNC: 2.5 G/DL (ref 1.9–3.5)
GLUCOSE SERPL-MCNC: 97 MG/DL (ref 65–99)
POTASSIUM SERPL-SCNC: 3.7 MMOL/L (ref 3.6–5.5)
PROT SERPL-MCNC: 6.7 G/DL (ref 6–8.2)
SODIUM SERPL-SCNC: 145 MMOL/L (ref 135–145)

## 2023-12-04 PROCEDURE — 80053 COMPREHEN METABOLIC PANEL: CPT

## 2023-12-04 PROCEDURE — 85652 RBC SED RATE AUTOMATED: CPT

## 2023-12-04 PROCEDURE — 86140 C-REACTIVE PROTEIN: CPT

## 2023-12-04 PROCEDURE — 85025 COMPLETE CBC W/AUTO DIFF WBC: CPT

## 2023-12-05 LAB — ERYTHROCYTE [SEDIMENTATION RATE] IN BLOOD BY WESTERGREN METHOD: NORMAL MM/HOUR (ref 0–20)

## 2023-12-06 ENCOUNTER — PRE-ADMISSION TESTING (OUTPATIENT)
Dept: ADMISSIONS | Facility: MEDICAL CENTER | Age: 48
End: 2023-12-06
Attending: ORTHOPAEDIC SURGERY
Payer: COMMERCIAL

## 2023-12-06 VITALS — HEIGHT: 71 IN | BODY MASS INDEX: 32.47 KG/M2

## 2023-12-06 RX ORDER — HYDROCODONE BITARTRATE AND ACETAMINOPHEN 5; 325 MG/1; MG/1
1 TABLET ORAL EVERY 8 HOURS PRN
COMMUNITY

## 2023-12-06 RX ORDER — CHOLECALCIFEROL (VITAMIN D3) 125 MCG
500 CAPSULE ORAL DAILY
COMMUNITY

## 2023-12-06 RX ORDER — VALSARTAN 160 MG/1
160 TABLET ORAL DAILY
COMMUNITY

## 2023-12-06 RX ORDER — OXYCODONE HYDROCHLORIDE 10 MG/1
10 TABLET ORAL
COMMUNITY

## 2023-12-06 RX ORDER — PREGABALIN 75 MG/1
75 CAPSULE ORAL NIGHTLY
COMMUNITY

## 2023-12-07 ENCOUNTER — ANESTHESIA (OUTPATIENT)
Dept: SURGERY | Facility: MEDICAL CENTER | Age: 48
End: 2023-12-07
Payer: COMMERCIAL

## 2023-12-07 ENCOUNTER — HOSPITAL ENCOUNTER (OUTPATIENT)
Facility: MEDICAL CENTER | Age: 48
End: 2023-12-07
Attending: ORTHOPAEDIC SURGERY | Admitting: ORTHOPAEDIC SURGERY
Payer: COMMERCIAL

## 2023-12-07 ENCOUNTER — ANESTHESIA EVENT (OUTPATIENT)
Dept: SURGERY | Facility: MEDICAL CENTER | Age: 48
End: 2023-12-07
Payer: COMMERCIAL

## 2023-12-07 VITALS
SYSTOLIC BLOOD PRESSURE: 153 MMHG | BODY MASS INDEX: 33.27 KG/M2 | OXYGEN SATURATION: 91 % | WEIGHT: 238.54 LBS | RESPIRATION RATE: 16 BRPM | HEART RATE: 77 BPM | TEMPERATURE: 98.2 F | DIASTOLIC BLOOD PRESSURE: 100 MMHG

## 2023-12-07 PROCEDURE — 160027 HCHG SURGERY MINUTES - 1ST 30 MINS LEVEL 2: Performed by: ORTHOPAEDIC SURGERY

## 2023-12-07 PROCEDURE — 160009 HCHG ANES TIME/MIN: Performed by: ORTHOPAEDIC SURGERY

## 2023-12-07 PROCEDURE — V2790 AMNIOTIC MEMBRANE: HCPCS | Performed by: ORTHOPAEDIC SURGERY

## 2023-12-07 PROCEDURE — A9270 NON-COVERED ITEM OR SERVICE: HCPCS | Performed by: ANESTHESIOLOGY

## 2023-12-07 PROCEDURE — 160025 RECOVERY II MINUTES (STATS): Performed by: ORTHOPAEDIC SURGERY

## 2023-12-07 PROCEDURE — 700101 HCHG RX REV CODE 250: Performed by: ANESTHESIOLOGY

## 2023-12-07 PROCEDURE — 700101 HCHG RX REV CODE 250: Performed by: ORTHOPAEDIC SURGERY

## 2023-12-07 PROCEDURE — 700111 HCHG RX REV CODE 636 W/ 250 OVERRIDE (IP): Performed by: ANESTHESIOLOGY

## 2023-12-07 PROCEDURE — 160038 HCHG SURGERY MINUTES - EA ADDL 1 MIN LEVEL 2: Performed by: ORTHOPAEDIC SURGERY

## 2023-12-07 PROCEDURE — 700105 HCHG RX REV CODE 258: Performed by: ORTHOPAEDIC SURGERY

## 2023-12-07 PROCEDURE — 160002 HCHG RECOVERY MINUTES (STAT): Performed by: ORTHOPAEDIC SURGERY

## 2023-12-07 PROCEDURE — 700111 HCHG RX REV CODE 636 W/ 250 OVERRIDE (IP): Mod: JZ | Performed by: ANESTHESIOLOGY

## 2023-12-07 PROCEDURE — 160035 HCHG PACU - 1ST 60 MINS PHASE I: Performed by: ORTHOPAEDIC SURGERY

## 2023-12-07 PROCEDURE — 160036 HCHG PACU - EA ADDL 30 MINS PHASE I: Performed by: ORTHOPAEDIC SURGERY

## 2023-12-07 PROCEDURE — 160048 HCHG OR STATISTICAL LEVEL 1-5: Performed by: ORTHOPAEDIC SURGERY

## 2023-12-07 PROCEDURE — 160046 HCHG PACU - 1ST 60 MINS PHASE II: Performed by: ORTHOPAEDIC SURGERY

## 2023-12-07 PROCEDURE — 700102 HCHG RX REV CODE 250 W/ 637 OVERRIDE(OP): Performed by: ANESTHESIOLOGY

## 2023-12-07 DEVICE — GRAFT SOFT TISSUE AMNIOCORD L5 CM X W3 CM (1EA): Type: IMPLANTABLE DEVICE | Status: FUNCTIONAL

## 2023-12-07 RX ORDER — DEXAMETHASONE SODIUM PHOSPHATE 4 MG/ML
INJECTION, SOLUTION INTRA-ARTICULAR; INTRALESIONAL; INTRAMUSCULAR; INTRAVENOUS; SOFT TISSUE PRN
Status: DISCONTINUED | OUTPATIENT
Start: 2023-12-07 | End: 2023-12-07 | Stop reason: SURG

## 2023-12-07 RX ORDER — HYDROMORPHONE HYDROCHLORIDE 1 MG/ML
0.1 INJECTION, SOLUTION INTRAMUSCULAR; INTRAVENOUS; SUBCUTANEOUS
Status: DISCONTINUED | OUTPATIENT
Start: 2023-12-07 | End: 2023-12-07 | Stop reason: HOSPADM

## 2023-12-07 RX ORDER — DIPHENHYDRAMINE HYDROCHLORIDE 50 MG/ML
12.5 INJECTION INTRAMUSCULAR; INTRAVENOUS
Status: DISCONTINUED | OUTPATIENT
Start: 2023-12-07 | End: 2023-12-07 | Stop reason: HOSPADM

## 2023-12-07 RX ORDER — CEFAZOLIN SODIUM 1 G/3ML
INJECTION, POWDER, FOR SOLUTION INTRAMUSCULAR; INTRAVENOUS PRN
Status: DISCONTINUED | OUTPATIENT
Start: 2023-12-07 | End: 2023-12-07 | Stop reason: SURG

## 2023-12-07 RX ORDER — OXYCODONE HCL 5 MG/5 ML
5 SOLUTION, ORAL ORAL
Status: COMPLETED | OUTPATIENT
Start: 2023-12-07 | End: 2023-12-07

## 2023-12-07 RX ORDER — SODIUM CHLORIDE, SODIUM LACTATE, POTASSIUM CHLORIDE, CALCIUM CHLORIDE 600; 310; 30; 20 MG/100ML; MG/100ML; MG/100ML; MG/100ML
INJECTION, SOLUTION INTRAVENOUS CONTINUOUS
Status: ACTIVE | OUTPATIENT
Start: 2023-12-07 | End: 2023-12-07

## 2023-12-07 RX ORDER — HYDROMORPHONE HYDROCHLORIDE 1 MG/ML
0.2 INJECTION, SOLUTION INTRAMUSCULAR; INTRAVENOUS; SUBCUTANEOUS
Status: DISCONTINUED | OUTPATIENT
Start: 2023-12-07 | End: 2023-12-07 | Stop reason: HOSPADM

## 2023-12-07 RX ORDER — SODIUM CHLORIDE, SODIUM LACTATE, POTASSIUM CHLORIDE, CALCIUM CHLORIDE 600; 310; 30; 20 MG/100ML; MG/100ML; MG/100ML; MG/100ML
INJECTION, SOLUTION INTRAVENOUS CONTINUOUS
Status: DISCONTINUED | OUTPATIENT
Start: 2023-12-07 | End: 2023-12-07 | Stop reason: HOSPADM

## 2023-12-07 RX ORDER — MIDAZOLAM HYDROCHLORIDE 1 MG/ML
INJECTION INTRAMUSCULAR; INTRAVENOUS PRN
Status: DISCONTINUED | OUTPATIENT
Start: 2023-12-07 | End: 2023-12-07 | Stop reason: SURG

## 2023-12-07 RX ORDER — MEPERIDINE HYDROCHLORIDE 25 MG/ML
6.25 INJECTION INTRAMUSCULAR; INTRAVENOUS; SUBCUTANEOUS
Status: DISCONTINUED | OUTPATIENT
Start: 2023-12-07 | End: 2023-12-07 | Stop reason: HOSPADM

## 2023-12-07 RX ORDER — KETOROLAC TROMETHAMINE 30 MG/ML
INJECTION, SOLUTION INTRAMUSCULAR; INTRAVENOUS PRN
Status: DISCONTINUED | OUTPATIENT
Start: 2023-12-07 | End: 2023-12-07 | Stop reason: SURG

## 2023-12-07 RX ORDER — HALOPERIDOL 5 MG/ML
1 INJECTION INTRAMUSCULAR
Status: DISCONTINUED | OUTPATIENT
Start: 2023-12-07 | End: 2023-12-07 | Stop reason: HOSPADM

## 2023-12-07 RX ORDER — MAGNESIUM HYDROXIDE 1200 MG/15ML
LIQUID ORAL
Status: COMPLETED | OUTPATIENT
Start: 2023-12-07 | End: 2023-12-07

## 2023-12-07 RX ORDER — HYDRALAZINE HYDROCHLORIDE 20 MG/ML
5 INJECTION INTRAMUSCULAR; INTRAVENOUS
Status: DISCONTINUED | OUTPATIENT
Start: 2023-12-07 | End: 2023-12-07 | Stop reason: HOSPADM

## 2023-12-07 RX ORDER — MIDAZOLAM HYDROCHLORIDE 1 MG/ML
1 INJECTION INTRAMUSCULAR; INTRAVENOUS
Status: DISCONTINUED | OUTPATIENT
Start: 2023-12-07 | End: 2023-12-07 | Stop reason: HOSPADM

## 2023-12-07 RX ORDER — EPHEDRINE SULFATE 50 MG/ML
5 INJECTION, SOLUTION INTRAVENOUS
Status: DISCONTINUED | OUTPATIENT
Start: 2023-12-07 | End: 2023-12-07 | Stop reason: HOSPADM

## 2023-12-07 RX ORDER — ONDANSETRON 2 MG/ML
INJECTION INTRAMUSCULAR; INTRAVENOUS PRN
Status: DISCONTINUED | OUTPATIENT
Start: 2023-12-07 | End: 2023-12-07 | Stop reason: SURG

## 2023-12-07 RX ORDER — OXYCODONE HCL 5 MG/5 ML
10 SOLUTION, ORAL ORAL
Status: COMPLETED | OUTPATIENT
Start: 2023-12-07 | End: 2023-12-07

## 2023-12-07 RX ORDER — ONDANSETRON 2 MG/ML
4 INJECTION INTRAMUSCULAR; INTRAVENOUS
Status: DISCONTINUED | OUTPATIENT
Start: 2023-12-07 | End: 2023-12-07 | Stop reason: HOSPADM

## 2023-12-07 RX ORDER — LABETALOL HYDROCHLORIDE 5 MG/ML
5 INJECTION, SOLUTION INTRAVENOUS
Status: COMPLETED | OUTPATIENT
Start: 2023-12-07 | End: 2023-12-07

## 2023-12-07 RX ORDER — HYDROMORPHONE HYDROCHLORIDE 1 MG/ML
0.4 INJECTION, SOLUTION INTRAMUSCULAR; INTRAVENOUS; SUBCUTANEOUS
Status: DISCONTINUED | OUTPATIENT
Start: 2023-12-07 | End: 2023-12-07 | Stop reason: HOSPADM

## 2023-12-07 RX ORDER — LIDOCAINE HYDROCHLORIDE 20 MG/ML
INJECTION, SOLUTION EPIDURAL; INFILTRATION; INTRACAUDAL; PERINEURAL PRN
Status: DISCONTINUED | OUTPATIENT
Start: 2023-12-07 | End: 2023-12-07 | Stop reason: SURG

## 2023-12-07 RX ADMIN — PROPOFOL 200 MG: 10 INJECTION, EMULSION INTRAVENOUS at 10:17

## 2023-12-07 RX ADMIN — CEFAZOLIN 2 G: 1 INJECTION, POWDER, FOR SOLUTION INTRAMUSCULAR; INTRAVENOUS at 10:18

## 2023-12-07 RX ADMIN — HYDROMORPHONE HYDROCHLORIDE 0.4 MG: 1 INJECTION, SOLUTION INTRAMUSCULAR; INTRAVENOUS; SUBCUTANEOUS at 12:10

## 2023-12-07 RX ADMIN — FENTANYL CITRATE 50 MCG: 50 INJECTION, SOLUTION INTRAMUSCULAR; INTRAVENOUS at 10:17

## 2023-12-07 RX ADMIN — FENTANYL CITRATE 50 MCG: 50 INJECTION, SOLUTION INTRAMUSCULAR; INTRAVENOUS at 11:50

## 2023-12-07 RX ADMIN — MIDAZOLAM HYDROCHLORIDE 2 MG: 1 INJECTION, SOLUTION INTRAMUSCULAR; INTRAVENOUS at 10:11

## 2023-12-07 RX ADMIN — KETOROLAC TROMETHAMINE 30 MG: 30 INJECTION, SOLUTION INTRAMUSCULAR; INTRAVENOUS at 10:46

## 2023-12-07 RX ADMIN — FENTANYL CITRATE 50 MCG: 50 INJECTION, SOLUTION INTRAMUSCULAR; INTRAVENOUS at 10:46

## 2023-12-07 RX ADMIN — LABETALOL HYDROCHLORIDE 5 MG: 5 INJECTION, SOLUTION INTRAVENOUS at 11:00

## 2023-12-07 RX ADMIN — HYDROMORPHONE HYDROCHLORIDE 0.2 MG: 1 INJECTION, SOLUTION INTRAMUSCULAR; INTRAVENOUS; SUBCUTANEOUS at 12:30

## 2023-12-07 RX ADMIN — FENTANYL CITRATE 50 MCG: 50 INJECTION, SOLUTION INTRAMUSCULAR; INTRAVENOUS at 10:22

## 2023-12-07 RX ADMIN — FENTANYL CITRATE 50 MCG: 50 INJECTION, SOLUTION INTRAMUSCULAR; INTRAVENOUS at 11:37

## 2023-12-07 RX ADMIN — ONDANSETRON 4 MG: 2 INJECTION INTRAMUSCULAR; INTRAVENOUS at 10:18

## 2023-12-07 RX ADMIN — FENTANYL CITRATE 50 MCG: 50 INJECTION, SOLUTION INTRAMUSCULAR; INTRAVENOUS at 11:11

## 2023-12-07 RX ADMIN — HYDROMORPHONE HYDROCHLORIDE 0.4 MG: 1 INJECTION, SOLUTION INTRAMUSCULAR; INTRAVENOUS; SUBCUTANEOUS at 12:22

## 2023-12-07 RX ADMIN — LABETALOL HYDROCHLORIDE 5 MG: 5 INJECTION, SOLUTION INTRAVENOUS at 12:35

## 2023-12-07 RX ADMIN — OXYCODONE HYDROCHLORIDE 10 MG: 5 SOLUTION ORAL at 11:10

## 2023-12-07 RX ADMIN — DEXAMETHASONE SODIUM PHOSPHATE 4 MG: 4 INJECTION INTRA-ARTICULAR; INTRALESIONAL; INTRAMUSCULAR; INTRAVENOUS; SOFT TISSUE at 10:18

## 2023-12-07 RX ADMIN — LIDOCAINE HYDROCHLORIDE 50 MG: 20 INJECTION, SOLUTION EPIDURAL; INFILTRATION; INTRACAUDAL at 10:17

## 2023-12-07 RX ADMIN — SODIUM CHLORIDE, POTASSIUM CHLORIDE, SODIUM LACTATE AND CALCIUM CHLORIDE: 600; 310; 30; 20 INJECTION, SOLUTION INTRAVENOUS at 10:11

## 2023-12-07 RX ADMIN — LABETALOL HYDROCHLORIDE 5 MG: 5 INJECTION, SOLUTION INTRAVENOUS at 11:43

## 2023-12-07 RX ADMIN — FENTANYL CITRATE 50 MCG: 50 INJECTION, SOLUTION INTRAMUSCULAR; INTRAVENOUS at 11:21

## 2023-12-07 RX ADMIN — FENTANYL CITRATE 100 MCG: 50 INJECTION, SOLUTION INTRAMUSCULAR; INTRAVENOUS at 10:30

## 2023-12-07 ASSESSMENT — PAIN DESCRIPTION - PAIN TYPE
TYPE: SURGICAL PAIN
TYPE: CHRONIC PAIN
TYPE: SURGICAL PAIN

## 2023-12-07 ASSESSMENT — FIBROSIS 4 INDEX: FIB4 SCORE: 0.85

## 2023-12-07 ASSESSMENT — PAIN SCALES - GENERAL: PAIN_LEVEL: 4

## 2023-12-07 NOTE — OR NURSING
1054- Patient arrived from OR via gurney.  L leg dressing CDI; cap refill in L foot <3 seconds. Cold pack applied to the L foot. Wound vac in place    Sedation/Resp Status: Non responsive to verbal with OPA in place. Respirations spontaneous and non-labored.    HR 79SR; VSS on 10L 02 via simple mask.  No pain or nausea as evidence by sleeping.    1100- OPA out for return of spontaneous eye opening/gag reflex - respirations continue spontaneous and non-labored.     1111- Medicated per MAR for 8/10 pain    1115- Hand off report to LEONARD Pryor.

## 2023-12-07 NOTE — ANESTHESIA POSTPROCEDURE EVALUATION
Patient: Jarod Fields    Procedure Summary       Date: 12/07/23 Room / Location:  OR  / SURGERY [facility]    Anesthesia Start: 1011 Anesthesia Stop: 1056    Procedure: LEFT LEG IRRIGATION AND DEBRIDEMENT, APPLICATION OF SKIN SUBSTITUTE (Leg Lower) Diagnosis: (TIBIAL WOUND)    Surgeons: Gentry Vargas M.D. Responsible Provider: Sandor Calvo M.D.    Anesthesia Type: general ASA Status: 2            Final Anesthesia Type: general  Last vitals  BP   Blood Pressure: (!) 159/101    Temp   36 °C (96.8 °F)    Pulse   68   Resp   15    SpO2   94 %      Anesthesia Post Evaluation    Patient location during evaluation: PACU  Patient participation: complete - patient participated  Level of consciousness: awake and alert  Pain score: 4    Airway patency: patent  Anesthetic complications: no  Cardiovascular status: hemodynamically stable  Respiratory status: acceptable  Hydration status: euvolemic    PONV: none          No notable events documented.

## 2023-12-07 NOTE — OR NURSING
1115: Report from LEONARD Ricks. Pt states he is having pain 7/10 in LLE, denies nausea.Medicated. Sips of water tolerating well. Wound vac on LLE, set to 125mmHg.     1130: Pt states continued to be 7/10, medicated. Pt's mother updated via voicemail.     1145: Wound vac intact and working well. Pt is hypertensive medicated.    1200: Pt's pain 6/10, sipping on juice. Pt on RA with O2 sat 94%. Family is in waiting room.  BP at baseline. Dr. Calvo, notified of pt's bp.    1210: Pt requiring more pain rx due to shooting pain in LLE. Medicated.     1230: Pt complains of pain 5/10, BP improving. Medicated.     1245: Pt states pain is tolerable and is ready to go home. Dr. Calvo ok w/ pt going home with bp close to baseline. IS 3500 (goal 3350).    1300: Pt meets criteria for stage 2. Report to LEONARD Rosado.

## 2023-12-07 NOTE — DISCHARGE INSTRUCTIONS
If any questions arise, call your provider.  If your provider is not available, please feel free to call the [facility] at [phone].    MEDICATIONS: Resume taking daily medication.  Take prescribed pain medication with food.  If no medication is prescribed, you may take non-aspirin pain medication if needed.  PAIN MEDICATION CAN BE VERY CONSTIPATING.  Take a stool softener or laxative such as senokot, pericolace, or milk of magnesia if needed.    Last pain medication given at 11:00 am, oxycodone 10 mg.    What to Expect Post Anesthesia    Rest and take it easy for the first 24 hours.  A responsible adult is recommended to remain with you during that time.  It is normal to feel sleepy.  We encourage you to not do anything that requires balance, judgment or coordination.    FOR 24 HOURS DO NOT:  Drive, operate machinery or run household appliances.  Drink beer or alcoholic beverages.  Make important decisions or sign legal documents.    To avoid nausea, slowly advance diet as tolerated, avoiding spicy or greasy foods for the first day.  Add more substantial food to your diet according to your provider's instructions.  Babies can be fed formula or breast milk as soon as they are hungry.  INCREASE FLUIDS AND FIBER TO AVOID CONSTIPATION.    MILD FLU-LIKE SYMPTOMS ARE NORMAL.  YOU MAY EXPERIENCE GENERALIZED MUSCLE ACHES, THROAT IRRITATION, HEADACHE AND/OR SOME NAUSEA.    POST OP PLAN:   Continue antibiotics per infectious disease.  Wound VAC to remain on for 7 days.  Wound VAC will be changed by me in my clinic.  Please do not allow any other provider to touch the wound VAC between now and then.  Okay for weightbearing as tolerated with crutches.         Negative Pressure Wound Therapy Home Guide  Negative pressure wound therapy (NPWT) uses a sponge or foam-like material (dressing) placed on or inside the wound. The wound is then covered and sealed with a cover dressing that sticks to your skin (is adhesive) to  keep air out. A tube is attached to the cover dressing, and this tube connects to a small pump. The pump removes drainage from the wound.  NPWT helps to increase blood flow to the wound and heal it from the inside. NPWT also helps pull the edges of the wound together and removes fluids and germs from the wound. NPWT may also be called wound vac.  What are the risks?  NPWT is usually safe to use. However, problems can occur, including:  Skin irritation from the dressing adhesive.  Bleeding.  Infection. Signs of infection include:  More redness, swelling, or pain.  More fluid or blood.  Warmth or hardness around the wound.  Pus or a bad smell.  Red streaks leading from the wound.  Dehydration. Wounds with large amounts of drainage can cause excessive body fluid loss.  Pain.  Supplies needed:  Wound cleanser or salt-water solution (saline).  Skin protectant. This may be a wipe, film, or spray.  Clean or germ-free (sterile) scissors.  New sponge or foam.  Cover dressing.  Gauze pad.  Tape.  Also have available:  Soap and water, or hand .  Disposable gloves.  Eye protection.  A disposable garbage bag.  Protective clothing.  How to change your dressing  Change the dressing as scheduled, if the dressing loses suction, or the pump is off for 2 hours or more.  Prepare to change your dressing    Take pain medicine 30 minutes before changing the dressing if told by your health care provider.  Wash your hands with soap and water for at least 20 seconds before you change the dressing. If soap and water are not available, use hand   Dry your hands with a clean towel.  Set up a clean station for wound care and set a plastic bag on or near your work surface for trash.  Open the dressing package so that the sponge dressing remains on the inside of the package.  Wear gloves, protective clothing, and eye protection.  Remove the old dressing    Turn off the pump and disconnect the tubing from the dressing.  Carefully  remove the adhesive cover dressing in the direction of your hair growth.  Remove the sponge dressing that is inside the wound. If the sponge sticks, use a wound cleanser or saline solution to wet the sponge and help it come off more easily.  Throw the old sponge and cover dressing supplies into the garbage bag.  Check the wound for signs of infection, including a bad smell, drainage (bleeding or pus), or new color changes (black, grey, yellow, or white) in the wound.  Remove your gloves by grabbing the cuff and turning the glove inside out. Place the gloves in the trash immediately.  Wash your hands with soap and water for at least 20 seconds. If soap and water are not available, use hand .  Dry your hands with a clean towel.  Clean your wound  Wear gloves, protective clothing, and eye protection. Follow your health care provider's instructions on how to clean your wound. You may be told to:  Clean the wound using a saline solution or a wound cleanser and a clean gauze pad.  Pat the wound dry with a gauze pad. Do not rub the wound.  Throw the gauze pad into the garbage bag.  Remove your gloves by grabbing the cuff and turning the glove inside out. Place the gloves in the trash immediately.  Wash your hands with soap and water for at least 20 seconds. If soap and water are not available, use hand   Dry your hands with a clean towel.  Apply the new dressing  Wear gloves, protective clothing, and eye protection.  If told by your health care provider, apply a skin protectant to any skin that will be exposed to adhesive. Let the skin protectant dry.  Cut a piece of new sponge dressing and put it on or in the wound.  Using clean scissors, cut a nickel-sized hole in the new cover dressing.  Apply the cover dressing.  Attach the suction tube over the hole in the cover dressing.  Take off your gloves. Put them in the plastic bag with the old dressing. Tie the bag shut and throw it away.  Wash your hands  with soap and water for at least 20 seconds. If soap and water are not available, use hand .  Dry your hands with a clean towel.  Turn the pump back on. The sponge dressing should collapse. Do not change the settings on the machine without talking to a health care provider.  Replace the container in the pump that collects fluid if it is full. Replace the container per the 's instructions or at least once a week, even if it is not full.  Follow your health care provider'sinstructions on how often you need to change and apply the new NPWT dressing.  General tips and recommendations  If the alarm sounds:  Stay calm and prepare to troubleshoot.  Do not turn off the pump or do anything with the dressing.  The alarm may go off for many reasons, including:  The battery is low. Change the battery or plug the device into electrical power.  The dressing has a leak. Find the leak and put tape over the leak.  The fluid collection container is full. Change the fluid container.  Call your health care provider right away if you cannot fix the problem.  Explain to your health care provider what is happening. Follow his or her instructions.  General instructions  Change the dressing as scheduled, if the dressing loses suction, or the pump is off for 2 hours or more.  Do not turn off the pump unless told to do so by your health care provider.  Do not turn off the pump for more than 2 hours. If the pump is off or you lose suction to the dressing for more than 2 hours, the dressing will need to be changed.  Check the machine frequently to make sure that therapy is on and that all clamps are open.  Do not use over-the-counter medicated or antiseptic creams, sprays, liquids, or dressings unless told by your health care provider.  Do not take baths, swim, or use a hot tub until your health care provider approves. You may only be allowed to take sponge baths. Ask your health care provider if you may take showers. If  your health care provider says it is okay to shower:  Do not take the pump into the shower.  Make sure the wound dressing is protected and sealed. The wound dressing must stay dry.  Contact a health care provider if:  You have new pain.  You develop irritation, a rash, or itching around the wound or dressing.  You see new color changes (black, grey, yellow, or white) in the wound.  The dressing changes are painful or cause bleeding.  The pump has been off for more than 2 hours, and you do not know how to change the dressing.  The pump alarm goes off, and you do not know what to do.  Get help right away if:  You have a lot of bleeding.  The wound breaks open.  You have severe pain.  You have signs of infection, such as:  More redness, swelling, or pain.  More fluid or blood.  Warmth or hardness.  Pus or a bad smell.  Red streaks leading from the wound.  A fever.  You see a sudden change in the color or texture of the drainage.  You have signs of dehydration, such as:  Little or no tears, urine, or sweat.  Muscle cramps.  Very dry mouth.  Headache.  Dizziness or confusion.  Summary  NPWT uses a sponge or dressing placed on or inside the wound.  NPWT helps to increase blood flow to the wound and heal it from the inside.  Follow your health care provider's instructions on how to clean your wound and how to change the dressing.  Contact a health care provider if you have new pain, an irritation, or a rash, or if the alarm goes off and you do not know what to do.  Get help right away if you have a lot of bleeding, your wound breaks open, or you have severe pain. Also, get help if you have signs of infection.  This information is not intended to replace advice given to you by your health care provider. Make sure you discuss any questions you have with your health care provider.  Document Revised: 05/16/2022 Document Reviewed: 05/16/2022  Elsevier Patient Education © 2023 [employer]

## 2023-12-07 NOTE — ANESTHESIA PREPROCEDURE EVALUATION
Case: [id number] Date/Time: 12/07/23 1045    Procedures:       LEFT LEG IRRIGATION AND DEBRIDEMENT, APPLICATION OF SKIN SUBSTITUTE      APPLICATION, SKIN SUBSTITUTE    Pre-op diagnosis: ABSCESS L LOWER LIMB    Location: [facility] OR 02 / SURGERY [facility]    Surgeons: Gentry Vargas M.D.            Relevant Problems   ANESTHESIA (within normal limits)      PULMONARY   (positive) Shortness of breath      NEURO (within normal limits)      CARDIAC (within normal limits)      GI (within normal limits)       (within normal limits)      ENDO (within normal limits)      Other   (positive) Back pain   (positive) Intractable back pain with lumbosacral radiculopathy   (positive) Pain of left lower extremity   (positive) Pain of left upper extremity   (positive) Reflex sympathetic dystrophy of lower limb   (positive) Reflex sympathetic dystrophy of upper extremity   (positive) Transaminitis   (positive) Wound dehiscence       Physical Exam    Airway   Mallampati: II  TM distance: >3 FB  Neck ROM: full       Cardiovascular - normal exam  Rhythm: regular  Rate: normal  (-) murmur     Dental - normal exam           Pulmonary - normal exam  Breath sounds clear to auscultation     Abdominal    Neurological - normal exam                   Anesthesia Plan    ASA 2       Plan - general       Airway plan will be LMA          Induction: intravenous    Postoperative Plan: Postoperative administration of opioids is intended.    Pertinent diagnostic labs and testing reviewed    Informed Consent:    Anesthetic plan and risks discussed with patient.    Use of blood products discussed with: patient whom consented to blood products.

## 2023-12-07 NOTE — OR NURSING
0928 : Pt allergies and NPO status verified. Home medications reconciled, preferred pharmacy verified. Belongings secured in locker. Pt verbalizes understanding of pain scale, expected course of stay, and plan of care. Surgical site verified with pt. IV access established. All questions answered. Bed in lowest position, call light within reach.   0930: Per MD Vargas, do not prep site due to dressing in place. MD Calvo updated of PICC line and current antibiotic administration.

## 2023-12-07 NOTE — ANESTHESIA PROCEDURE NOTES
Airway    Date/Time: 12/7/2023 10:17 AM    Performed by: Sandor Calvo M.D.  Authorized by: Sandor Calvo M.D.    Location:  OR  Urgency:  Elective  Indications for Airway Management:  Anesthesia      Spontaneous Ventilation: absent    Sedation Level:  Deep  Preoxygenated: Yes    Final Airway Type:  Supraglottic airway  Final Supraglottic Airway:  Standard LMA    SGA Size:  4  Number of Attempts at Approach:  1

## 2023-12-07 NOTE — ANESTHESIA TIME REPORT
Anesthesia Start and Stop Event Times       Date Time Event    12/7/2023 0950 Ready for Procedure     1011 Anesthesia Start     1056 Anesthesia Stop          Responsible Staff  12/07/23      Name Role Begin End    Sandor Calvo M.D. Anesth 1011 1056          Overtime Reason:  no overtime (within assigned shift)    Comments:

## 2023-12-07 NOTE — OP REPORT
DATE OF OPERATION: 12/7/2023     SURGEON: Gentry Vargas M.D.    ANESTHESIOLOGIST: Anesthesiologist: Sandor Calvo M.D.    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  He had failed multiple debridements and closures with outside provider and then came to me about 2 months ago.  He has since undergone debridement with wound VAC and his wound is looking excellent.  At the distal aspect of the wound there is about 2 cm remaining with exposed tibialis anterior tendon.  I discussed treatment options with him and we discussed continued backing versus opting for repeat debridement of the tendon with biologic graft to try to get granulation tissue over the tendon.  After the risk-benefit discussion he elected to proceed    DETAILS OF PROCEDURE: Patient was met in the preoperative holding area the left side was marked as correct operative site.  Risk-benefit discussion was had and consent was signed.  He was brought to the operating room timeout confirmed patient, laterality, and procedure.  Anesthesia was induced and an airway was established.  Limb was prepped in the normal sterile surgical fashion.    We began with a 15 blade by sharply dissecting out the superficial aspect of the tibialis anterior tendon that was exposed in the base of the wound.  We debrided this down to nice healthy tendon that was not desiccated.  The edge of the wound was freshened up well with sharp dissection in order to get down to nice  bleeding healthy base.  We then irrigated with normal saline.  We then placed the amniotic graft into the wound.  We then fashioned a wound VAC over top of this.  Wound VAC was sealed without difficulty    COMPLICATIONS:none    POST OP PLAN:   Continue antibiotics per infectious disease  Wound VAC to remain on for 7 days.  Wound VAC will be changed by me in my clinic.  Please do not allow any other provider to touch the wound VAC between now and then  Okay for weightbearing as tolerated with crutches   ____________________________________   Gentry Vargas M.D.

## 2023-12-11 ENCOUNTER — HOSPITAL ENCOUNTER (OUTPATIENT)
Facility: MEDICAL CENTER | Age: 48
End: 2023-12-11
Attending: PHYSICIAN ASSISTANT
Payer: COMMERCIAL

## 2023-12-11 LAB
ALBUMIN SERPL BCP-MCNC: 4.3 G/DL (ref 3.2–4.9)
ALBUMIN/GLOB SERPL: 1.5 G/DL
ALP SERPL-CCNC: 53 U/L (ref 30–99)
ALT SERPL-CCNC: 39 U/L (ref 2–50)
ANION GAP SERPL CALC-SCNC: 12 MMOL/L (ref 7–16)
AST SERPL-CCNC: 32 U/L (ref 12–45)
BASOPHILS # BLD AUTO: 0.7 % (ref 0–1.8)
BASOPHILS # BLD: 0.03 K/UL (ref 0–0.12)
BILIRUB SERPL-MCNC: 0.5 MG/DL (ref 0.1–1.5)
BUN SERPL-MCNC: 12 MG/DL (ref 8–22)
CALCIUM ALBUM COR SERPL-MCNC: 8.8 MG/DL (ref 8.5–10.5)
CALCIUM SERPL-MCNC: 9 MG/DL (ref 8.5–10.5)
CHLORIDE SERPL-SCNC: 106 MMOL/L (ref 96–112)
CO2 SERPL-SCNC: 22 MMOL/L (ref 20–33)
CREAT SERPL-MCNC: 0.72 MG/DL (ref 0.5–1.4)
CRP SERPL HS-MCNC: <0.3 MG/DL (ref 0–0.75)
EOSINOPHIL # BLD AUTO: 0.09 K/UL (ref 0–0.51)
EOSINOPHIL NFR BLD: 2 % (ref 0–6.9)
ERYTHROCYTE [DISTWIDTH] IN BLOOD BY AUTOMATED COUNT: 45.8 FL (ref 35.9–50)
GFR SERPLBLD CREATININE-BSD FMLA CKD-EPI: 113 ML/MIN/1.73 M 2
GLOBULIN SER CALC-MCNC: 2.9 G/DL (ref 1.9–3.5)
GLUCOSE SERPL-MCNC: 124 MG/DL (ref 65–99)
HCT VFR BLD AUTO: 44.4 % (ref 42–52)
HGB BLD-MCNC: 15.2 G/DL (ref 14–18)
IMM GRANULOCYTES # BLD AUTO: 0.01 K/UL (ref 0–0.11)
IMM GRANULOCYTES NFR BLD AUTO: 0.2 % (ref 0–0.9)
LYMPHOCYTES # BLD AUTO: 1.31 K/UL (ref 1–4.8)
LYMPHOCYTES NFR BLD: 28.7 % (ref 22–41)
MCH RBC QN AUTO: 32.9 PG (ref 27–33)
MCHC RBC AUTO-ENTMCNC: 34.2 G/DL (ref 32.3–36.5)
MCV RBC AUTO: 96.1 FL (ref 81.4–97.8)
MONOCYTES # BLD AUTO: 0.32 K/UL (ref 0–0.85)
MONOCYTES NFR BLD AUTO: 7 % (ref 0–13.4)
NEUTROPHILS # BLD AUTO: 2.81 K/UL (ref 1.82–7.42)
NEUTROPHILS NFR BLD: 61.4 % (ref 44–72)
NRBC # BLD AUTO: 0 K/UL
NRBC BLD-RTO: 0 /100 WBC (ref 0–0.2)
PLATELET # BLD AUTO: 213 K/UL (ref 164–446)
PMV BLD AUTO: 9.8 FL (ref 9–12.9)
POTASSIUM SERPL-SCNC: 3.5 MMOL/L (ref 3.6–5.5)
PROT SERPL-MCNC: 7.2 G/DL (ref 6–8.2)
RBC # BLD AUTO: 4.62 M/UL (ref 4.7–6.1)
SODIUM SERPL-SCNC: 140 MMOL/L (ref 135–145)
WBC # BLD AUTO: 4.6 K/UL (ref 4.8–10.8)

## 2023-12-11 PROCEDURE — 85652 RBC SED RATE AUTOMATED: CPT

## 2023-12-11 PROCEDURE — 86140 C-REACTIVE PROTEIN: CPT

## 2023-12-11 PROCEDURE — 85025 COMPLETE CBC W/AUTO DIFF WBC: CPT

## 2023-12-11 PROCEDURE — 80053 COMPREHEN METABOLIC PANEL: CPT

## 2023-12-12 LAB — ERYTHROCYTE [SEDIMENTATION RATE] IN BLOOD BY WESTERGREN METHOD: 4 MM/HOUR (ref 0–20)

## 2023-12-18 ENCOUNTER — HOSPITAL ENCOUNTER (OUTPATIENT)
Facility: MEDICAL CENTER | Age: 48
End: 2023-12-18
Attending: PHYSICIAN ASSISTANT
Payer: COMMERCIAL

## 2023-12-18 LAB
ALBUMIN SERPL BCP-MCNC: 4.4 G/DL (ref 3.2–4.9)
ALBUMIN/GLOB SERPL: 1.8 G/DL
ALP SERPL-CCNC: 60 U/L (ref 30–99)
ALT SERPL-CCNC: 43 U/L (ref 2–50)
ANION GAP SERPL CALC-SCNC: 10 MMOL/L (ref 7–16)
AST SERPL-CCNC: 33 U/L (ref 12–45)
BASOPHILS # BLD AUTO: 0.8 % (ref 0–1.8)
BASOPHILS # BLD: 0.03 K/UL (ref 0–0.12)
BILIRUB SERPL-MCNC: 0.5 MG/DL (ref 0.1–1.5)
BUN SERPL-MCNC: 12 MG/DL (ref 8–22)
CALCIUM ALBUM COR SERPL-MCNC: 8.8 MG/DL (ref 8.5–10.5)
CALCIUM SERPL-MCNC: 9.1 MG/DL (ref 8.5–10.5)
CHLORIDE SERPL-SCNC: 105 MMOL/L (ref 96–112)
CO2 SERPL-SCNC: 25 MMOL/L (ref 20–33)
CREAT SERPL-MCNC: 0.61 MG/DL (ref 0.5–1.4)
CRP SERPL HS-MCNC: 0.5 MG/L (ref 0–3)
EOSINOPHIL # BLD AUTO: 0.11 K/UL (ref 0–0.51)
EOSINOPHIL NFR BLD: 3 % (ref 0–6.9)
ERYTHROCYTE [DISTWIDTH] IN BLOOD BY AUTOMATED COUNT: 45.3 FL (ref 35.9–50)
ERYTHROCYTE [SEDIMENTATION RATE] IN BLOOD BY WESTERGREN METHOD: 5 MM/HOUR (ref 0–20)
GFR SERPLBLD CREATININE-BSD FMLA CKD-EPI: 119 ML/MIN/1.73 M 2
GLOBULIN SER CALC-MCNC: 2.5 G/DL (ref 1.9–3.5)
GLUCOSE SERPL-MCNC: 82 MG/DL (ref 65–99)
HCT VFR BLD AUTO: 43.3 % (ref 42–52)
HGB BLD-MCNC: 15.1 G/DL (ref 14–18)
IMM GRANULOCYTES # BLD AUTO: 0.01 K/UL (ref 0–0.11)
IMM GRANULOCYTES NFR BLD AUTO: 0.3 % (ref 0–0.9)
LYMPHOCYTES # BLD AUTO: 1.14 K/UL (ref 1–4.8)
LYMPHOCYTES NFR BLD: 30.8 % (ref 22–41)
MCH RBC QN AUTO: 33.6 PG (ref 27–33)
MCHC RBC AUTO-ENTMCNC: 34.9 G/DL (ref 32.3–36.5)
MCV RBC AUTO: 96.2 FL (ref 81.4–97.8)
MONOCYTES # BLD AUTO: 0.41 K/UL (ref 0–0.85)
MONOCYTES NFR BLD AUTO: 11.1 % (ref 0–13.4)
NEUTROPHILS # BLD AUTO: 2 K/UL (ref 1.82–7.42)
NEUTROPHILS NFR BLD: 54 % (ref 44–72)
NRBC # BLD AUTO: 0 K/UL
NRBC BLD-RTO: 0 /100 WBC (ref 0–0.2)
PLATELET # BLD AUTO: 133 K/UL (ref 164–446)
PLATELET # BLD AUTO: 186 K/UL (ref 164–446)
PMV BLD AUTO: 9.9 FL (ref 9–12.9)
POTASSIUM SERPL-SCNC: 4 MMOL/L (ref 3.6–5.5)
PROT SERPL-MCNC: 6.9 G/DL (ref 6–8.2)
RBC # BLD AUTO: 4.5 M/UL (ref 4.7–6.1)
SODIUM SERPL-SCNC: 140 MMOL/L (ref 135–145)
WBC # BLD AUTO: 3.7 K/UL (ref 4.8–10.8)

## 2023-12-18 PROCEDURE — 85025 COMPLETE CBC W/AUTO DIFF WBC: CPT

## 2023-12-18 PROCEDURE — 80053 COMPREHEN METABOLIC PANEL: CPT

## 2023-12-18 PROCEDURE — 85652 RBC SED RATE AUTOMATED: CPT

## 2023-12-18 PROCEDURE — 86141 C-REACTIVE PROTEIN HS: CPT

## 2023-12-27 ENCOUNTER — HOSPITAL ENCOUNTER (OUTPATIENT)
Facility: MEDICAL CENTER | Age: 48
End: 2023-12-27
Attending: PHYSICIAN ASSISTANT
Payer: COMMERCIAL

## 2023-12-27 LAB
BASOPHILS # BLD AUTO: 0.6 % (ref 0–1.8)
BASOPHILS # BLD: 0.02 K/UL (ref 0–0.12)
EOSINOPHIL # BLD AUTO: 0.12 K/UL (ref 0–0.51)
EOSINOPHIL NFR BLD: 3.5 % (ref 0–6.9)
ERYTHROCYTE [DISTWIDTH] IN BLOOD BY AUTOMATED COUNT: 42.6 FL (ref 35.9–50)
ERYTHROCYTE [SEDIMENTATION RATE] IN BLOOD BY WESTERGREN METHOD: 4 MM/HOUR (ref 0–20)
HCT VFR BLD AUTO: 44.1 % (ref 42–52)
HGB BLD-MCNC: 15.6 G/DL (ref 14–18)
IMM GRANULOCYTES # BLD AUTO: 0.01 K/UL (ref 0–0.11)
IMM GRANULOCYTES NFR BLD AUTO: 0.3 % (ref 0–0.9)
LYMPHOCYTES # BLD AUTO: 1.55 K/UL (ref 1–4.8)
LYMPHOCYTES NFR BLD: 45.5 % (ref 22–41)
MCH RBC QN AUTO: 33.5 PG (ref 27–33)
MCHC RBC AUTO-ENTMCNC: 35.4 G/DL (ref 32.3–36.5)
MCV RBC AUTO: 94.6 FL (ref 81.4–97.8)
MONOCYTES # BLD AUTO: 0.32 K/UL (ref 0–0.85)
MONOCYTES NFR BLD AUTO: 9.4 % (ref 0–13.4)
NEUTROPHILS # BLD AUTO: 1.39 K/UL (ref 1.82–7.42)
NEUTROPHILS NFR BLD: 40.7 % (ref 44–72)
NRBC # BLD AUTO: 0 K/UL
NRBC BLD-RTO: 0 /100 WBC (ref 0–0.2)
PLATELET # BLD AUTO: 110 K/UL (ref 164–446)
PLATELET # BLD AUTO: 207 K/UL (ref 164–446)
PMV BLD AUTO: 9.3 FL (ref 9–12.9)
RBC # BLD AUTO: 4.66 M/UL (ref 4.7–6.1)
WBC # BLD AUTO: 3.4 K/UL (ref 4.8–10.8)

## 2023-12-27 PROCEDURE — 85025 COMPLETE CBC W/AUTO DIFF WBC: CPT

## 2023-12-27 PROCEDURE — 85652 RBC SED RATE AUTOMATED: CPT

## 2023-12-27 PROCEDURE — 80053 COMPREHEN METABOLIC PANEL: CPT

## 2023-12-27 PROCEDURE — 86141 C-REACTIVE PROTEIN HS: CPT

## 2023-12-28 LAB
ALBUMIN SERPL BCP-MCNC: 4.5 G/DL (ref 3.2–4.9)
ALBUMIN/GLOB SERPL: 1.7 G/DL
ALP SERPL-CCNC: 54 U/L (ref 30–99)
ALT SERPL-CCNC: 60 U/L (ref 2–50)
ANION GAP SERPL CALC-SCNC: 15 MMOL/L (ref 7–16)
AST SERPL-CCNC: 41 U/L (ref 12–45)
BILIRUB SERPL-MCNC: 0.6 MG/DL (ref 0.1–1.5)
BUN SERPL-MCNC: 11 MG/DL (ref 8–22)
CALCIUM ALBUM COR SERPL-MCNC: 8.5 MG/DL (ref 8.5–10.5)
CALCIUM SERPL-MCNC: 8.9 MG/DL (ref 8.5–10.5)
CHLORIDE SERPL-SCNC: 104 MMOL/L (ref 96–112)
CO2 SERPL-SCNC: 22 MMOL/L (ref 20–33)
CREAT SERPL-MCNC: 0.7 MG/DL (ref 0.5–1.4)
CRP SERPL HS-MCNC: 2.6 MG/L (ref 0–3)
GFR SERPLBLD CREATININE-BSD FMLA CKD-EPI: 114 ML/MIN/1.73 M 2
GLOBULIN SER CALC-MCNC: 2.6 G/DL (ref 1.9–3.5)
GLUCOSE SERPL-MCNC: 86 MG/DL (ref 65–99)
POTASSIUM SERPL-SCNC: 3.5 MMOL/L (ref 3.6–5.5)
PROT SERPL-MCNC: 7.1 G/DL (ref 6–8.2)
SODIUM SERPL-SCNC: 141 MMOL/L (ref 135–145)

## 2024-01-12 ENCOUNTER — OFFICE VISIT (OUTPATIENT)
Dept: URGENT CARE | Facility: PHYSICIAN GROUP | Age: 49
End: 2024-01-12
Payer: COMMERCIAL

## 2024-01-12 VITALS
BODY MASS INDEX: 32.76 KG/M2 | TEMPERATURE: 98.4 F | HEART RATE: 75 BPM | SYSTOLIC BLOOD PRESSURE: 148 MMHG | DIASTOLIC BLOOD PRESSURE: 104 MMHG | OXYGEN SATURATION: 98 % | WEIGHT: 234 LBS | HEIGHT: 71 IN | RESPIRATION RATE: 16 BRPM

## 2024-01-12 DIAGNOSIS — I10 HYPERTENSION, UNSPECIFIED TYPE: ICD-10-CM

## 2024-01-12 DIAGNOSIS — I10 ELEVATED BLOOD PRESSURE READING WITH DIAGNOSIS OF HYPERTENSION: ICD-10-CM

## 2024-01-12 DIAGNOSIS — R51.9 ACUTE NONINTRACTABLE HEADACHE, UNSPECIFIED HEADACHE TYPE: ICD-10-CM

## 2024-01-12 PROCEDURE — 3077F SYST BP >= 140 MM HG: CPT | Performed by: PHYSICIAN ASSISTANT

## 2024-01-12 PROCEDURE — 99214 OFFICE O/P EST MOD 30 MIN: CPT | Performed by: PHYSICIAN ASSISTANT

## 2024-01-12 PROCEDURE — 3080F DIAST BP >= 90 MM HG: CPT | Performed by: PHYSICIAN ASSISTANT

## 2024-01-12 RX ORDER — LISINOPRIL 40 MG/1
1 TABLET ORAL
COMMUNITY
Start: 2024-01-11

## 2024-01-12 RX ORDER — PAROXETINE HCL 10 MG
1 TABLET ORAL EVERY MORNING
COMMUNITY
Start: 2024-01-11

## 2024-01-12 ASSESSMENT — ENCOUNTER SYMPTOMS
PALPITATIONS: 0
SENSORY CHANGE: 0
SORE THROAT: 0
SHORTNESS OF BREATH: 0
ORTHOPNEA: 0
WHEEZING: 0
TINGLING: 0
FOCAL WEAKNESS: 0
SPEECH CHANGE: 0
FEVER: 0
HEADACHES: 1
DOUBLE VISION: 0
PND: 0
CLAUDICATION: 0
COUGH: 0
BLURRED VISION: 0
CHILLS: 0
WEAKNESS: 0

## 2024-01-12 ASSESSMENT — FIBROSIS 4 INDEX: FIB4 SCORE: 1.2

## 2024-01-12 NOTE — PROGRESS NOTES
Subjective     Sampson Fields is a 48 y.o. male who presents with Hypertension (Headache, Pt states took /121 30 mins ago )    PMH:  has a past medical history of Chronic back pain, Chronic infection (07/2023), CRPS (complex regional pain syndrome type I) (2013), Elevated BP without diagnosis of hypertension, Kidney stones (1995), Nerve pain, and Pain (02/18/2015).    He has no past medical history of ASTHMA, CAD (coronary artery disease), Cancer ([facility]), Congestive heart failure ([facility]), COPD, Diabetes, Hypertension, Infectious disease, Liver disease, Psychiatric disorder, Renal disorder, Seizure disorder ([facility]), or Stroke ([facility]).  MEDS:   Current Outpatient Medications:     lisinopril (PRINIVIL) 40 MG tablet, Take 1 Tablet by mouth every day., Disp: , Rfl:     PAXIL 10 MG Tab, Take 1 Tablet by mouth every morning., Disp: , Rfl:     cyanocobalamin (VITAMIN B-12) 500 MCG Tab, Take 500 mcg by mouth every day., Disp: , Rfl:     pregabalin (LYRICA) 75 MG Cap, Take 75 mg by mouth every evening. (Patient not taking: Reported on 1/12/2024), Disp: , Rfl:     valsartan (DIOVAN) 160 MG Tab, Take 160 mg by mouth every day. (Patient not taking: Reported on 1/12/2024), Disp: , Rfl:     oxyCODONE immediate release (ROXICODONE) 10 MG immediate release tablet, Take 10 mg by mouth. ONE THREE TIMES PER WEEK PRIOR TO WOUND CARE (Patient not taking: Reported on 1/12/2024), Disp: , Rfl:     HYDROcodone-acetaminophen (NORCO) 5-325 MG Tab per tablet, Take 1 Tablet by mouth every 8 hours as needed. (Patient not taking: Reported on 1/12/2024), Disp: , Rfl:     Naloxone (NARCAN) 4 MG/0.1ML Liquid, , Disp: , Rfl:     doxycycline (VIBRAMYCIN) 100 MG Tab, Take 1 Tablet by mouth 2 times a day. (Patient not taking: Reported on 1/12/2024), Disp: 20 Tablet, Rfl: 0  ALLERGIES: No Known Allergies  SURGHX:   Past Surgical History:   Procedure Laterality Date    IRRIGATION & DEBRIDEMENT GENERAL  12/7/2023    Procedure: LEFT LEG IRRIGATION AND  DEBRIDEMENT, APPLICATION OF SKIN SUBSTITUTE;  Surgeon: Gentry Vargas M.D.;  Location: SURGERY [facility];  Service: Orthopedics    IL DEBRIDE SKIN SUBQ TISSUE FIRST 20 SQ CM Left 09/09/2023    Procedure: LEFT LOWER LEG IRRIGATION AND DEBRIDEMENT, WOUND AND WOUND CLOSURE;  Surgeon: Max Arias M.D.;  Location: SURGERY [facility];  Service: Orthopedics    PB SECD CLOS SURG WND EXTEN/COMPLIC Left 09/09/2023    Procedure: CLOSURE, WOUND, SECONDARY;  Surgeon: Max Arias M.D.;  Location: SURGERY [facility];  Service: Orthopedics    FASCIOTOMY Left 07/02/2023    Procedure: FASCIOTOMY - LOWER LEG;  Surgeon: Yefri Murguia M.D.;  Location: SURGERY [facility];  Service: Orthopedics    PB DECOMPBESS ANT/LAT+POST LEG CMPART Left 06/29/2023    Procedure: FASCIAL CLOSURE;  Surgeon: Max Arias M.D.;  Location: SURGERY [facility];  Service: Orthopedics    INCISION AND DRAINAGE ORTHOPEDIC  06/29/2023    Procedure: INCISION AND DRAINAGE, LOWER LEG;  Surgeon: Max Arias M.D.;  Location: SURGERY [facility];  Service: Orthopedics    SPINAL CORD STIMULATOR  2023    approx Jan or Feb. REMOVED.    SPINAL CORD STIMULATOR  02/19/2015    Performed by Ghassan Coy M.D. at [facility] ORS / REMOVED 6/2023    IL PERCUT IMPLNT NEUROELECT,EPIDURAL  02/04/2015    Performed by Ghassan Coy M.D. at [facility]    IL PERCUT IMPLNT NEUROELECT,EPIDURAL  02/04/2015    Performed by Ghassan Coy M.D. at [facility]    PB INJECT NERV BLCK,STELLATE GANGLION  12/10/2014    Performed by Ghassan Coy M.D. at [facility]    PB INJECT NERV BLCK,STELLATE GANGLION  12/03/2014    Performed by Ghassan Coy M.D. at [facility]    PB INJECT NERV BLCK,STELLATE GANGLION  11/26/2014    Performed by Ghassan Coy M.D. at [facility]    GASTROSCOPY WITH BIOPSY  06/22/2013    Performed by Robbie Nesbitt M.D. at ENDOSCOPY [facility]     OTHER ORTHOPEDIC SURGERY  2003    L tib/fib w/hardware-removed     SOCHX:  reports that he quit smoking about 4 years ago. His smoking use included cigarettes. He started smoking about 35 years ago. He has a 15.5 pack-year smoking history. He has been exposed to tobacco smoke. He has never used smokeless tobacco. He reports that he does not currently use alcohol. He reports that he does not use drugs.  FH: Reviewed with patient, not pertinent to this visit.           Patient presents with:  Hypertension: Headache, Pt states took /121 30 mins ago     PT was seen by PCP yesterday who increased BP medication with no improvement in the last 24 hours. PT states his headache is worse today.   Patient denies vision changes, slurred speech, upper or lower extremity weakness.  Patient states he has had this in the past and was given IV medication to bring his blood pressure down and was hoping he can get that done here in urgent care.  Patient denies any chest pain, shortness of breath, nausea vomiting or diarrhea.  No recent URI illnesses.  No other complaints.    Other  This is a new problem. The current episode started yesterday. The problem occurs constantly. The problem has been gradually worsening. Associated symptoms include headaches. Pertinent negatives include no chest pain, chills, congestion, coughing, fever, sore throat or weakness. The symptoms are aggravated by exertion. Treatments tried: doubled HTN medication. The treatment provided no relief.       Review of Systems   Constitutional:  Negative for chills and fever.   HENT:  Negative for congestion and sore throat.    Eyes:  Negative for blurred vision and double vision.   Respiratory:  Negative for cough, shortness of breath and wheezing.    Cardiovascular:  Negative for chest pain, palpitations, orthopnea, claudication, leg swelling and PND.   Neurological:  Positive for headaches. Negative for tingling, sensory change, speech change, focal  "weakness and weakness.   All other systems reviewed and are negative.             Objective     BP (!) 148/104 (BP Location: Left arm, Patient Position: Sitting, BP Cuff Size: Adult)   Pulse 75   Temp 36.9 °C (98.4 °F) (Temporal)   Resp 16   Ht 1.803 m (5' 11\")   Wt 106 kg (234 lb)   SpO2 98%   BMI 32.64 kg/m²      Physical Exam  Vitals and nursing note reviewed.   Constitutional:       General: He is not in acute distress.     Appearance: Normal appearance. He is well-developed. He is not ill-appearing or toxic-appearing.   HENT:      Head: Normocephalic and atraumatic.      Right Ear: Tympanic membrane normal.      Left Ear: Tympanic membrane normal.      Nose: Nose normal.      Mouth/Throat:      Lips: Pink.      Mouth: Mucous membranes are moist.      Pharynx: Oropharynx is clear. Uvula midline.   Eyes:      Extraocular Movements: Extraocular movements intact.      Conjunctiva/sclera: Conjunctivae normal.      Pupils: Pupils are equal, round, and reactive to light.   Cardiovascular:      Rate and Rhythm: Normal rate and regular rhythm.      Pulses: Normal pulses.      Heart sounds: Normal heart sounds.   Pulmonary:      Effort: Pulmonary effort is normal.      Breath sounds: Normal breath sounds.   Abdominal:      General: Bowel sounds are normal.      Palpations: Abdomen is soft.   Musculoskeletal:         General: Normal range of motion.      Cervical back: Normal range of motion and neck supple.   Skin:     General: Skin is warm and dry.      Capillary Refill: Capillary refill takes less than 2 seconds.   Neurological:      General: No focal deficit present.      Mental Status: He is alert and oriented to person, place, and time.      Cranial Nerves: No cranial nerve deficit.      Motor: Motor function is intact.      Coordination: Coordination is intact.      Gait: Gait normal.   Psychiatric:         Mood and Affect: Mood normal.                             Assessment & Plan              1. Elevated " blood pressure reading with diagnosis of hypertension        2. Acute nonintractable headache, unspecified headache type        3. Hypertension, unspecified type              PT BP is quite elevated at home and here in the  office, not responding well to BPmedications, and has worsening HA>  PT needs to be seen at a facility that can offer a higher level of care for this complaint.      PT verbalized understanding of this. PT will go POV to ER for evaluation and treatment of elevated BP and HA.       PT declined EMS transport, declined tylenol for HA.     I spoke with ER at [facility] regarding PT CC and pending arrival POV.     PT discharged.

## 2024-04-08 ENCOUNTER — APPOINTMENT (OUTPATIENT)
Dept: ADMISSIONS | Facility: MEDICAL CENTER | Age: 49
End: 2024-04-08
Attending: ORTHOPAEDIC SURGERY
Payer: COMMERCIAL

## 2024-04-10 ENCOUNTER — PRE-ADMISSION TESTING (OUTPATIENT)
Dept: ADMISSIONS | Facility: MEDICAL CENTER | Age: 49
End: 2024-04-10
Attending: ORTHOPAEDIC SURGERY
Payer: COMMERCIAL

## 2024-04-10 RX ORDER — COVID-19 ANTIGEN TEST
440 KIT MISCELLANEOUS 2 TIMES DAILY PRN
COMMUNITY

## 2024-04-11 ENCOUNTER — ANESTHESIA EVENT (OUTPATIENT)
Dept: SURGERY | Facility: MEDICAL CENTER | Age: 49
End: 2024-04-11
Payer: COMMERCIAL

## 2024-04-11 ENCOUNTER — HOSPITAL ENCOUNTER (OUTPATIENT)
Facility: MEDICAL CENTER | Age: 49
End: 2024-04-14
Attending: ORTHOPAEDIC SURGERY | Admitting: ORTHOPAEDIC SURGERY
Payer: COMMERCIAL

## 2024-04-11 ENCOUNTER — ANESTHESIA (OUTPATIENT)
Dept: SURGERY | Facility: MEDICAL CENTER | Age: 49
End: 2024-04-11
Payer: COMMERCIAL

## 2024-04-11 DIAGNOSIS — A49.02 MRSA INFECTION: ICD-10-CM

## 2024-04-11 DIAGNOSIS — T81.30XA WOUND DEHISCENCE: ICD-10-CM

## 2024-04-11 DIAGNOSIS — S81.802D OPEN LEG WOUND, LEFT, SUBSEQUENT ENCOUNTER: ICD-10-CM

## 2024-04-11 PROBLEM — I16.0 HYPERTENSIVE URGENCY: Status: ACTIVE | Noted: 2024-04-11

## 2024-04-11 LAB
ANION GAP SERPL CALC-SCNC: 13 MMOL/L (ref 7–16)
BUN SERPL-MCNC: 12 MG/DL (ref 8–22)
CALCIUM SERPL-MCNC: 8.7 MG/DL (ref 8.5–10.5)
CHLORIDE SERPL-SCNC: 102 MMOL/L (ref 96–112)
CO2 SERPL-SCNC: 22 MMOL/L (ref 20–33)
CREAT SERPL-MCNC: 0.72 MG/DL (ref 0.5–1.4)
FUNGUS SPEC FUNGUS STN: NORMAL
GFR SERPLBLD CREATININE-BSD FMLA CKD-EPI: 113 ML/MIN/1.73 M 2
GLUCOSE SERPL-MCNC: 130 MG/DL (ref 65–99)
POTASSIUM SERPL-SCNC: 4.1 MMOL/L (ref 3.6–5.5)
SIGNIFICANT IND 70042: NORMAL
SITE SITE: NORMAL
SODIUM SERPL-SCNC: 137 MMOL/L (ref 135–145)
SOURCE SOURCE: NORMAL

## 2024-04-11 PROCEDURE — 87070 CULTURE OTHR SPECIMN AEROBIC: CPT

## 2024-04-11 PROCEDURE — 700102 HCHG RX REV CODE 250 W/ 637 OVERRIDE(OP): Performed by: ORTHOPAEDIC SURGERY

## 2024-04-11 PROCEDURE — 96367 TX/PROPH/DG ADDL SEQ IV INF: CPT

## 2024-04-11 PROCEDURE — 96366 THER/PROPH/DIAG IV INF ADDON: CPT

## 2024-04-11 PROCEDURE — 160027 HCHG SURGERY MINUTES - 1ST 30 MINS LEVEL 2: Performed by: ORTHOPAEDIC SURGERY

## 2024-04-11 PROCEDURE — 96376 TX/PRO/DX INJ SAME DRUG ADON: CPT

## 2024-04-11 PROCEDURE — 87076 CULTURE ANAEROBE IDENT EACH: CPT

## 2024-04-11 PROCEDURE — 160009 HCHG ANES TIME/MIN: Performed by: ORTHOPAEDIC SURGERY

## 2024-04-11 PROCEDURE — 87147 CULTURE TYPE IMMUNOLOGIC: CPT

## 2024-04-11 PROCEDURE — 160048 HCHG OR STATISTICAL LEVEL 1-5: Performed by: ORTHOPAEDIC SURGERY

## 2024-04-11 PROCEDURE — 96365 THER/PROPH/DIAG IV INF INIT: CPT

## 2024-04-11 PROCEDURE — 96375 TX/PRO/DX INJ NEW DRUG ADDON: CPT

## 2024-04-11 PROCEDURE — 160036 HCHG PACU - EA ADDL 30 MINS PHASE I: Performed by: ORTHOPAEDIC SURGERY

## 2024-04-11 PROCEDURE — A9270 NON-COVERED ITEM OR SERVICE: HCPCS | Performed by: ANESTHESIOLOGY

## 2024-04-11 PROCEDURE — 700102 HCHG RX REV CODE 250 W/ 637 OVERRIDE(OP): Performed by: ANESTHESIOLOGY

## 2024-04-11 PROCEDURE — 160035 HCHG PACU - 1ST 60 MINS PHASE I: Performed by: ORTHOPAEDIC SURGERY

## 2024-04-11 PROCEDURE — 700111 HCHG RX REV CODE 636 W/ 250 OVERRIDE (IP): Performed by: ANESTHESIOLOGY

## 2024-04-11 PROCEDURE — 87641 MR-STAPH DNA AMP PROBE: CPT

## 2024-04-11 PROCEDURE — 87102 FUNGUS ISOLATION CULTURE: CPT

## 2024-04-11 PROCEDURE — 87186 SC STD MICRODIL/AGAR DIL: CPT

## 2024-04-11 PROCEDURE — 87205 SMEAR GRAM STAIN: CPT

## 2024-04-11 PROCEDURE — 700111 HCHG RX REV CODE 636 W/ 250 OVERRIDE (IP): Performed by: ORTHOPAEDIC SURGERY

## 2024-04-11 PROCEDURE — 99232 SBSQ HOSP IP/OBS MODERATE 35: CPT | Performed by: STUDENT IN AN ORGANIZED HEALTH CARE EDUCATION/TRAINING PROGRAM

## 2024-04-11 PROCEDURE — 36415 COLL VENOUS BLD VENIPUNCTURE: CPT

## 2024-04-11 PROCEDURE — G0378 HOSPITAL OBSERVATION PER HR: HCPCS

## 2024-04-11 PROCEDURE — 87075 CULTR BACTERIA EXCEPT BLOOD: CPT

## 2024-04-11 PROCEDURE — 700101 HCHG RX REV CODE 250: Performed by: ANESTHESIOLOGY

## 2024-04-11 PROCEDURE — 160002 HCHG RECOVERY MINUTES (STAT): Performed by: ORTHOPAEDIC SURGERY

## 2024-04-11 PROCEDURE — 160038 HCHG SURGERY MINUTES - EA ADDL 1 MIN LEVEL 2: Performed by: ORTHOPAEDIC SURGERY

## 2024-04-11 PROCEDURE — 700105 HCHG RX REV CODE 258: Performed by: ORTHOPAEDIC SURGERY

## 2024-04-11 PROCEDURE — 87077 CULTURE AEROBIC IDENTIFY: CPT | Mod: 91

## 2024-04-11 PROCEDURE — 80048 BASIC METABOLIC PNL TOTAL CA: CPT

## 2024-04-11 PROCEDURE — A9270 NON-COVERED ITEM OR SERVICE: HCPCS | Performed by: ORTHOPAEDIC SURGERY

## 2024-04-11 DEVICE — MESH EPIFIX 4CM X 4.5CM: Type: IMPLANTABLE DEVICE | Site: LEG | Status: FUNCTIONAL

## 2024-04-11 RX ORDER — LISINOPRIL 20 MG/1
40 TABLET ORAL
Status: DISCONTINUED | OUTPATIENT
Start: 2024-04-12 | End: 2024-04-14 | Stop reason: HOSPADM

## 2024-04-11 RX ORDER — HYDROMORPHONE HYDROCHLORIDE 1 MG/ML
0.25 INJECTION, SOLUTION INTRAMUSCULAR; INTRAVENOUS; SUBCUTANEOUS
Status: DISCONTINUED | OUTPATIENT
Start: 2024-04-11 | End: 2024-04-12

## 2024-04-11 RX ORDER — DOCUSATE SODIUM 100 MG/1
100 CAPSULE, LIQUID FILLED ORAL 2 TIMES DAILY
Status: DISCONTINUED | OUTPATIENT
Start: 2024-04-11 | End: 2024-04-14 | Stop reason: HOSPADM

## 2024-04-11 RX ORDER — BISACODYL 10 MG
10 SUPPOSITORY, RECTAL RECTAL
Status: DISCONTINUED | OUTPATIENT
Start: 2024-04-11 | End: 2024-04-14 | Stop reason: HOSPADM

## 2024-04-11 RX ORDER — ONDANSETRON 2 MG/ML
4 INJECTION INTRAMUSCULAR; INTRAVENOUS
Status: DISCONTINUED | OUTPATIENT
Start: 2024-04-11 | End: 2024-04-11 | Stop reason: HOSPADM

## 2024-04-11 RX ORDER — CEFAZOLIN SODIUM 1 G/3ML
INJECTION, POWDER, FOR SOLUTION INTRAMUSCULAR; INTRAVENOUS PRN
Status: DISCONTINUED | OUTPATIENT
Start: 2024-04-11 | End: 2024-04-11 | Stop reason: SURG

## 2024-04-11 RX ORDER — OXYCODONE HYDROCHLORIDE 5 MG/1
5 TABLET ORAL
Status: DISCONTINUED | OUTPATIENT
Start: 2024-04-11 | End: 2024-04-12

## 2024-04-11 RX ORDER — MIDAZOLAM HYDROCHLORIDE 1 MG/ML
INJECTION INTRAMUSCULAR; INTRAVENOUS PRN
Status: DISCONTINUED | OUTPATIENT
Start: 2024-04-11 | End: 2024-04-11 | Stop reason: SURG

## 2024-04-11 RX ORDER — MEPERIDINE HYDROCHLORIDE 25 MG/ML
6.25 INJECTION INTRAMUSCULAR; INTRAVENOUS; SUBCUTANEOUS
Status: DISCONTINUED | OUTPATIENT
Start: 2024-04-11 | End: 2024-04-11 | Stop reason: HOSPADM

## 2024-04-11 RX ORDER — IBUPROFEN 800 MG/1
800 TABLET ORAL 3 TIMES DAILY PRN
Status: DISCONTINUED | OUTPATIENT
Start: 2024-04-14 | End: 2024-04-14 | Stop reason: HOSPADM

## 2024-04-11 RX ORDER — LABETALOL HYDROCHLORIDE 5 MG/ML
10 INJECTION, SOLUTION INTRAVENOUS
Status: DISCONTINUED | OUTPATIENT
Start: 2024-04-11 | End: 2024-04-14 | Stop reason: HOSPADM

## 2024-04-11 RX ORDER — DIPHENHYDRAMINE HYDROCHLORIDE 50 MG/ML
12.5 INJECTION INTRAMUSCULAR; INTRAVENOUS
Status: DISCONTINUED | OUTPATIENT
Start: 2024-04-11 | End: 2024-04-11 | Stop reason: HOSPADM

## 2024-04-11 RX ORDER — HYDROMORPHONE HYDROCHLORIDE 1 MG/ML
0.4 INJECTION, SOLUTION INTRAMUSCULAR; INTRAVENOUS; SUBCUTANEOUS
Status: DISCONTINUED | OUTPATIENT
Start: 2024-04-11 | End: 2024-04-11 | Stop reason: HOSPADM

## 2024-04-11 RX ORDER — SUMATRIPTAN 50 MG/1
100 TABLET, FILM COATED ORAL ONCE
Qty: 2 TABLET | Refills: 0 | Status: COMPLETED | OUTPATIENT
Start: 2024-04-11 | End: 2024-04-11

## 2024-04-11 RX ORDER — HYDROCHLOROTHIAZIDE 25 MG/1
25 TABLET ORAL
Status: DISCONTINUED | OUTPATIENT
Start: 2024-04-12 | End: 2024-04-12

## 2024-04-11 RX ORDER — HYDROMORPHONE HYDROCHLORIDE 1 MG/ML
0.2 INJECTION, SOLUTION INTRAMUSCULAR; INTRAVENOUS; SUBCUTANEOUS
Status: DISCONTINUED | OUTPATIENT
Start: 2024-04-11 | End: 2024-04-11 | Stop reason: HOSPADM

## 2024-04-11 RX ORDER — OXYCODONE HYDROCHLORIDE 5 MG/1
2.5 TABLET ORAL
Status: DISCONTINUED | OUTPATIENT
Start: 2024-04-11 | End: 2024-04-12

## 2024-04-11 RX ORDER — SUMATRIPTAN 50 MG/1
100 TABLET, FILM COATED ORAL
COMMUNITY

## 2024-04-11 RX ORDER — HALOPERIDOL 5 MG/ML
1 INJECTION INTRAMUSCULAR
Status: DISCONTINUED | OUTPATIENT
Start: 2024-04-11 | End: 2024-04-11 | Stop reason: HOSPADM

## 2024-04-11 RX ORDER — OXYCODONE HCL 5 MG/5 ML
5 SOLUTION, ORAL ORAL
Status: COMPLETED | OUTPATIENT
Start: 2024-04-11 | End: 2024-04-11

## 2024-04-11 RX ORDER — ONDANSETRON 2 MG/ML
INJECTION INTRAMUSCULAR; INTRAVENOUS PRN
Status: DISCONTINUED | OUTPATIENT
Start: 2024-04-11 | End: 2024-04-11 | Stop reason: SURG

## 2024-04-11 RX ORDER — HYDROMORPHONE HYDROCHLORIDE 1 MG/ML
0.1 INJECTION, SOLUTION INTRAMUSCULAR; INTRAVENOUS; SUBCUTANEOUS
Status: DISCONTINUED | OUTPATIENT
Start: 2024-04-11 | End: 2024-04-11 | Stop reason: HOSPADM

## 2024-04-11 RX ORDER — LIDOCAINE HYDROCHLORIDE 20 MG/ML
INJECTION, SOLUTION EPIDURAL; INFILTRATION; INTRACAUDAL; PERINEURAL PRN
Status: DISCONTINUED | OUTPATIENT
Start: 2024-04-11 | End: 2024-04-11 | Stop reason: SURG

## 2024-04-11 RX ORDER — DIPHENHYDRAMINE HYDROCHLORIDE 50 MG/ML
25 INJECTION INTRAMUSCULAR; INTRAVENOUS EVERY 6 HOURS PRN
Status: DISCONTINUED | OUTPATIENT
Start: 2024-04-11 | End: 2024-04-14 | Stop reason: HOSPADM

## 2024-04-11 RX ORDER — ENEMA 19; 7 G/133ML; G/133ML
1 ENEMA RECTAL
Status: DISCONTINUED | OUTPATIENT
Start: 2024-04-11 | End: 2024-04-14 | Stop reason: HOSPADM

## 2024-04-11 RX ORDER — OXYCODONE HCL 5 MG/5 ML
10 SOLUTION, ORAL ORAL
Status: COMPLETED | OUTPATIENT
Start: 2024-04-11 | End: 2024-04-11

## 2024-04-11 RX ORDER — AMLODIPINE BESYLATE 5 MG/1
5 TABLET ORAL
Status: DISCONTINUED | OUTPATIENT
Start: 2024-04-12 | End: 2024-04-14 | Stop reason: HOSPADM

## 2024-04-11 RX ORDER — KETOROLAC TROMETHAMINE 15 MG/ML
15 INJECTION, SOLUTION INTRAMUSCULAR; INTRAVENOUS EVERY 6 HOURS
Status: DISCONTINUED | OUTPATIENT
Start: 2024-04-11 | End: 2024-04-14 | Stop reason: HOSPADM

## 2024-04-11 RX ORDER — DEXAMETHASONE SODIUM PHOSPHATE 4 MG/ML
INJECTION, SOLUTION INTRA-ARTICULAR; INTRALESIONAL; INTRAMUSCULAR; INTRAVENOUS; SOFT TISSUE PRN
Status: DISCONTINUED | OUTPATIENT
Start: 2024-04-11 | End: 2024-04-11 | Stop reason: SURG

## 2024-04-11 RX ORDER — AMOXICILLIN 250 MG
1 CAPSULE ORAL
Status: DISCONTINUED | OUTPATIENT
Start: 2024-04-11 | End: 2024-04-14 | Stop reason: HOSPADM

## 2024-04-11 RX ORDER — ONDANSETRON 2 MG/ML
4 INJECTION INTRAMUSCULAR; INTRAVENOUS EVERY 4 HOURS PRN
Status: DISCONTINUED | OUTPATIENT
Start: 2024-04-11 | End: 2024-04-14 | Stop reason: HOSPADM

## 2024-04-11 RX ORDER — POLYETHYLENE GLYCOL 3350 17 G/17G
1 POWDER, FOR SOLUTION ORAL 2 TIMES DAILY PRN
Status: DISCONTINUED | OUTPATIENT
Start: 2024-04-11 | End: 2024-04-14 | Stop reason: HOSPADM

## 2024-04-11 RX ORDER — ACETAMINOPHEN 500 MG
1000 TABLET ORAL EVERY 6 HOURS
Status: DISCONTINUED | OUTPATIENT
Start: 2024-04-11 | End: 2024-04-14 | Stop reason: HOSPADM

## 2024-04-11 RX ORDER — ACETAMINOPHEN 500 MG
1000 TABLET ORAL EVERY 6 HOURS PRN
Status: DISCONTINUED | OUTPATIENT
Start: 2024-04-16 | End: 2024-04-14 | Stop reason: HOSPADM

## 2024-04-11 RX ORDER — AMOXICILLIN 250 MG
1 CAPSULE ORAL NIGHTLY
Status: DISCONTINUED | OUTPATIENT
Start: 2024-04-11 | End: 2024-04-14 | Stop reason: HOSPADM

## 2024-04-11 RX ORDER — SODIUM CHLORIDE, SODIUM LACTATE, POTASSIUM CHLORIDE, CALCIUM CHLORIDE 600; 310; 30; 20 MG/100ML; MG/100ML; MG/100ML; MG/100ML
INJECTION, SOLUTION INTRAVENOUS CONTINUOUS
Status: ACTIVE | OUTPATIENT
Start: 2024-04-11 | End: 2024-04-11

## 2024-04-11 RX ADMIN — OXYCODONE HYDROCHLORIDE 10 MG: 5 SOLUTION ORAL at 15:01

## 2024-04-11 RX ADMIN — PROPOFOL 200 MG: 10 INJECTION, EMULSION INTRAVENOUS at 14:02

## 2024-04-11 RX ADMIN — SUMATRIPTAN SUCCINATE 100 MG: 50 TABLET ORAL at 22:32

## 2024-04-11 RX ADMIN — KETOROLAC TROMETHAMINE 15 MG: 15 INJECTION, SOLUTION INTRAMUSCULAR; INTRAVENOUS at 17:36

## 2024-04-11 RX ADMIN — LIDOCAINE HYDROCHLORIDE 100 MG: 20 INJECTION, SOLUTION EPIDURAL; INFILTRATION; INTRACAUDAL at 14:02

## 2024-04-11 RX ADMIN — ACETAMINOPHEN 1000 MG: 500 TABLET, FILM COATED ORAL at 22:39

## 2024-04-11 RX ADMIN — HYDROMORPHONE HYDROCHLORIDE 0.25 MG: 1 INJECTION, SOLUTION INTRAMUSCULAR; INTRAVENOUS; SUBCUTANEOUS at 22:52

## 2024-04-11 RX ADMIN — CEFAZOLIN 2 G: 1 INJECTION, POWDER, FOR SOLUTION INTRAMUSCULAR; INTRAVENOUS at 14:07

## 2024-04-11 RX ADMIN — HYDROMORPHONE HYDROCHLORIDE 0.4 MG: 1 INJECTION, SOLUTION INTRAMUSCULAR; INTRAVENOUS; SUBCUTANEOUS at 15:45

## 2024-04-11 RX ADMIN — HYDROMORPHONE HYDROCHLORIDE 0.4 MG: 1 INJECTION, SOLUTION INTRAMUSCULAR; INTRAVENOUS; SUBCUTANEOUS at 15:10

## 2024-04-11 RX ADMIN — LABETALOL HYDROCHLORIDE 10 MG: 5 INJECTION INTRAVENOUS at 22:33

## 2024-04-11 RX ADMIN — KETOROLAC TROMETHAMINE 15 MG: 15 INJECTION, SOLUTION INTRAMUSCULAR; INTRAVENOUS at 22:40

## 2024-04-11 RX ADMIN — ACETAMINOPHEN 1000 MG: 500 TABLET, FILM COATED ORAL at 17:35

## 2024-04-11 RX ADMIN — FENTANYL CITRATE 50 MCG: 50 INJECTION, SOLUTION INTRAMUSCULAR; INTRAVENOUS at 14:02

## 2024-04-11 RX ADMIN — SODIUM CHLORIDE, POTASSIUM CHLORIDE, SODIUM LACTATE AND CALCIUM CHLORIDE: 600; 310; 30; 20 INJECTION, SOLUTION INTRAVENOUS at 14:00

## 2024-04-11 RX ADMIN — LABETALOL HYDROCHLORIDE 10 MG: 5 INJECTION INTRAVENOUS at 23:08

## 2024-04-11 RX ADMIN — OXYCODONE 5 MG: 5 TABLET ORAL at 19:30

## 2024-04-11 RX ADMIN — FENTANYL CITRATE 50 MCG: 50 INJECTION, SOLUTION INTRAMUSCULAR; INTRAVENOUS at 15:01

## 2024-04-11 RX ADMIN — MIDAZOLAM HYDROCHLORIDE 2 MG: 1 INJECTION, SOLUTION INTRAMUSCULAR; INTRAVENOUS at 14:00

## 2024-04-11 RX ADMIN — AMPICILLIN AND SULBACTAM 3 G: 1; 2 INJECTION, POWDER, FOR SOLUTION INTRAMUSCULAR; INTRAVENOUS at 17:58

## 2024-04-11 RX ADMIN — VANCOMYCIN HYDROCHLORIDE 2750 MG: 5 INJECTION, POWDER, LYOPHILIZED, FOR SOLUTION INTRAVENOUS at 19:28

## 2024-04-11 RX ADMIN — ONDANSETRON 4 MG: 2 INJECTION INTRAMUSCULAR; INTRAVENOUS at 14:11

## 2024-04-11 RX ADMIN — HYDROMORPHONE HYDROCHLORIDE 0.2 MG: 1 INJECTION, SOLUTION INTRAMUSCULAR; INTRAVENOUS; SUBCUTANEOUS at 15:00

## 2024-04-11 RX ADMIN — ONDANSETRON 4 MG: 2 INJECTION INTRAMUSCULAR; INTRAVENOUS at 22:48

## 2024-04-11 RX ADMIN — LABETALOL HYDROCHLORIDE 10 MG: 5 INJECTION INTRAVENOUS at 22:48

## 2024-04-11 RX ADMIN — PROPOFOL 50 MG: 10 INJECTION, EMULSION INTRAVENOUS at 14:22

## 2024-04-11 RX ADMIN — DOCUSATE SODIUM 100 MG: 100 CAPSULE, LIQUID FILLED ORAL at 17:36

## 2024-04-11 RX ADMIN — FENTANYL CITRATE 50 MCG: 50 INJECTION, SOLUTION INTRAMUSCULAR; INTRAVENOUS at 14:22

## 2024-04-11 RX ADMIN — HYDROMORPHONE HYDROCHLORIDE 0.4 MG: 1 INJECTION, SOLUTION INTRAMUSCULAR; INTRAVENOUS; SUBCUTANEOUS at 15:16

## 2024-04-11 RX ADMIN — FENTANYL CITRATE 50 MCG: 50 INJECTION, SOLUTION INTRAMUSCULAR; INTRAVENOUS at 15:07

## 2024-04-11 RX ADMIN — HYDROMORPHONE HYDROCHLORIDE 0.2 MG: 1 INJECTION, SOLUTION INTRAMUSCULAR; INTRAVENOUS; SUBCUTANEOUS at 15:30

## 2024-04-11 RX ADMIN — DEXAMETHASONE SODIUM PHOSPHATE 8 MG: 4 INJECTION INTRA-ARTICULAR; INTRALESIONAL; INTRAMUSCULAR; INTRAVENOUS; SOFT TISSUE at 14:07

## 2024-04-11 ASSESSMENT — PAIN DESCRIPTION - PAIN TYPE
TYPE: ACUTE PAIN
TYPE: ACUTE PAIN
TYPE: SURGICAL PAIN
TYPE: ACUTE PAIN
TYPE: SURGICAL PAIN
TYPE: ACUTE PAIN

## 2024-04-11 ASSESSMENT — FIBROSIS 4 INDEX: FIB4 SCORE: 1.23

## 2024-04-11 ASSESSMENT — PAIN SCALES - GENERAL: PAIN_LEVEL: 2

## 2024-04-11 NOTE — ANESTHESIA POSTPROCEDURE EVALUATION
Patient: Jarod Fields    Procedure Summary       Date: 04/11/24 Room / Location: [place] [address] / SURGERY [facility]    Anesthesia Start: 1400 Anesthesia Stop: 1454    Procedures:       LEFT LEG WOUND DEBRIDEMENT AND SECONDARY CLOSURE (Left: Leg Lower)      CLOSURE, WOUND, SECONDARY (Left: Leg Lower) Diagnosis: (OPEN WOUND LEFT LOWER LEG)    Surgeons: Raymond Anderson M.D. Responsible Provider: Derick Pardo M.D.    Anesthesia Type: general ASA Status: 2            Final Anesthesia Type: general  Last vitals  BP   Blood Pressure: (!) 167/109    Temp   36.7 °C (98 °F)    Pulse   80   Resp   18    SpO2   97 %      Anesthesia Post Evaluation    Patient location during evaluation: PACU  Patient participation: complete - patient participated  Level of consciousness: awake and alert  Pain score: 2    Airway patency: patent  Anesthetic complications: no  Cardiovascular status: hemodynamically stable  Respiratory status: acceptable  Hydration status: euvolemic    PONV: none          There were no known notable events for this encounter.     Nurse Pain Score: 5 (NPRS)

## 2024-04-11 NOTE — PROGRESS NOTES
Medication history reviewed with PT at bedside    Med rec is complete per PT reporrting    Allergies reviewed.     Patient denies any outpatient antibiotics in the last 30 days.     Patient is not taking anticoagulants.    Preferred pharmacy for this visit -  [facility] on [address] ([phone])

## 2024-04-11 NOTE — ANESTHESIA PREPROCEDURE EVALUATION
Case: [id number] Date/Time: 04/11/24 1415    Procedures:       LEFT LEG WOUND DEBRIDEMENT AND SECONDARY CLOSURE, OTHER INDICATED PROCEDURES (Left)      CLOSURE, WOUND, SECONDARY    Pre-op diagnosis: OPEN WOUND LEFT LOWER LEG    Location: [facility] OR 14 / SURGERY [facility]    Surgeons: Raymond Anderson M.D.            Relevant Problems   PULMONARY   (positive) Shortness of breath       Physical Exam    Airway   Mallampati: II  TM distance: >3 FB  Neck ROM: full       Cardiovascular - normal exam  Rhythm: regular  Rate: normal  (-) murmur     Dental - normal exam           Pulmonary - normal exam  Breath sounds clear to auscultation     Abdominal    Neurological - normal exam                   Anesthesia Plan    ASA 2       Plan - general       Airway plan will be LMA          Induction: intravenous    Postoperative Plan: Postoperative administration of opioids is intended.    Pertinent diagnostic labs and testing reviewed    Informed Consent:    Anesthetic plan and risks discussed with patient.    Use of blood products discussed with: patient whom consented to blood products.

## 2024-04-11 NOTE — OP REPORT
DATE OF SERVICE:  04/11/2024     PREOPERATIVE DIAGNOSIS:  Chronic recurrent left leg open wound.     POSTOPERATIVE DIAGNOSIS:  Chronic recurrent left leg open wound.     PROCEDURES PERFORMED:  1.  Excisional debridement of left leg wound measuring 1x0.5 cm including   skin, subcutaneous tissue.  2.  Application of skin graft substitute left leg wound.  3.  Secondary closure left leg chronic recurrent open wound.  4.  Application of wound VAC, left leg less than 50 cm2 (non-disposable   incisional VAC).     SURGEON:  Raymond Anderson MD     ANESTHESIOLOGIST:  Derick Pardo MD     ANESTHESIA:  General.     ESTIMATED BLOOD LOSS:  Minimal.     SPECIMENS:  Wound swab of some fluid and the wound was sent to lab for aerobic   and anaerobic cultures.     DRAINS:  KCI incisional wound VAC was applied to the reapproximated   secondarily closed wound at 125 mmHg low continuous pressure.     INDICATIONS FOR PROCEDURE:  The patient is 49 years old.  He has a long   history of a previous compartment syndrome, treated with fasciotomies.  He had   a while back surgical management in a delayed fashion of attempted fasciotomy   closure, which was complicated due to infection, wound dehiscence requiring   multiple surgical debridements.  He presented to one of my colleagues many   months ago for infected residual open wound and he had multiple surgical   procedures including debridement and attempted secondary closure and he still   at this point been left with a small nonhealing recurrent residual open wound   at the anterior aspect of his leg with exposed tibialis anterior tendon.  He   has not shown any signs of progressive healing for many months now. At this   point, he preferred attempting a repeat wound debridement, secondary closure   and incisional wound VAC application to try to achieve definitive wound   healing.  He signed informed consent preoperatively and wished to proceed with   surgery as outlined above.      DESCRIPTION OF PROCEDURE:  The patient was met in the preoperative holding   area.  His surgical site was signed.  His consent was confirmed to be   accurate.  He was taken back to the operating room and general anesthesia was   induced.  Left lower extremity was provisionally cleansed with isopropyl   alcohol and then prepped and draped in the usual sterile fashion.  A formal   timeout was performed to confirm patient's correct name, correct surgical   site, correct procedure and correct laterality.  He had about a 1 cm x 5 mm   open wound with eschar, which I debrided the eschar and there was some   fibrinous exudate present, which I swabbed and sent to lab for aerobic and   anaerobic cultures.  There was some granulation tissue underneath, but exposed   tibialis anterior tendon deep within the wound.  I extended this incision to   about 6 cm beside his open wound to about 6 cm, ellipsing out the area of   residual chronic open wound including skin, subcutaneous tissue with 15 blade   scalpel.  I developed full-thickness subcutaneous flaps over the tibialis   anterior tendon, both medially and laterally to allow for secondary closure of   the mobilized skin.  Prior to attempting closure, I thoroughly lavaged the   wound with Pulsavac using normal saline.  There is no evidence of obvious   residual or obvious deep infection or significant fluid collection in that   area.  The tendon, which had been exposed through the tendon sheath appeared   viable after thorough lavage of the wound and sufficient hemostasis and   roughen up the subcutaneous tissue layers and deeper fibrous tissue layers   with a curette.  I then placed my medics and EpiFix skin graft substitute over   the tendon underneath the subcutaneous tissue to help facilitate a better   chance of definitive wound healing and then reapproximated the skin edges   secondarily with interrupted 2-0 nylon without any tension on the skin.  I   then placed  Mepitel protective barrier over the secondarily closed incision   and achieved a good seal at 125 mmHg low continuous pressure.  I placed a   well-padded compressive dressing.  He was awoken from anesthesia and   transferred on the gurney and taken to postanesthesia care unit in stable   condition.     PLAN:  1.  The patient will be admitted for observation postoperatively.  2.  He can weightbear as tolerated to left lower extremity.  3.  Recommend continuing his incisional wound VAC for as long as possible   until we have achieved definitive healing given his extensive history of   chronic open wound with recurrent wound dehiscence and complications.  4.  I will start empiric antibiotic therapy and follow up intraoperative   cultures and adjust antibiotics accordingly.  5.  Anticipated discharge to home with an incisional wound VAC pending,   culture results and appropriate plan for antibiotic therapy.        ______________________________  MD VALENTIN Duval/PRANEETH    DD:  04/11/2024 15:07  DT:  04/11/2024 15:37    Job#:  [id number]

## 2024-04-11 NOTE — ANESTHESIA PROCEDURE NOTES
Airway    Date/Time: 4/11/2024 2:03 PM    Performed by: Derick Pardo M.D.  Authorized by: Derick Pardo M.D.    Location:  OR  Urgency:  Elective  Difficult Airway: No    Indications for Airway Management:  Anesthesia      Spontaneous Ventilation: present    Preoxygenated: Yes    Patient Position:  Sniffing  Mask Difficulty Assessment:  0 - not attempted  Final Airway Type:  Supraglottic airway  Final Supraglottic Airway:  Standard LMA    SGA Size:  5  Number of Attempts at Approach:  1

## 2024-04-11 NOTE — FACE TO FACE
Face to Face Note  -  Durable Medical Equipment    Raymond Anderson M.D. - NPI: [id number]  I certify that this patient is under my care and that they had a durable medical equipment(DME)face to face encounter by myself that meets the physician DME face-to-face encounter requirements with this patient on:    Date of encounter:   Patient:                    MRN:                       YOB: 2023  Jarod Fields  [id number]  1975     The encounter with the patient was in whole, or in part, for the following medical condition, which is the primary reason for durable medical equipment:  Post-Op Surgery    I certify that, based on my findings, the following durable medical equipment is medically necessary:    Wound Vac.    My Clinical findings support the need for the above equipment due to:  Wound/Incision

## 2024-04-11 NOTE — ANESTHESIA TIME REPORT
Anesthesia Start and Stop Event Times       Date Time Event    4/11/2024 1400 Anesthesia Start     1454 Anesthesia Stop          Responsible Staff  04/11/24      Name Role Begin End    Derick Pardo M.D. Anesth 1400 145          Overtime Reason:  no overtime (within assigned shift)    Comments:

## 2024-04-11 NOTE — OR SURGEON
Immediate Post OP Note    PreOp Diagnosis: Left leg chronic, recurrent open wound with concern for infection      PostOp Diagnosis: same      Procedure(s):  LEFT LEG WOUND DEBRIDEMENT AND SECONDARY CLOSURE - Wound Class: Contaminated  CLOSURE, WOUND, SECONDARY - Wound Class: Contaminated    Surgeon(s):  Raymond Anderson M.D.    Anesthesiologist/Type of Anesthesia:  Anesthesiologist: Derick Pardo M.D./General    Surgical Staff:  Circulator: Robbie Lawler RCELIA  Relief Circulator: Jg Underwood RCELIA; Tremaine Booth RCELIA  Relief Scrub: Yaniv Najear  Scrub Person: Kel Son    Specimens removed if any:  ID Type Source Tests Collected by Time Destination   1 : left leg wound Tissue Leg AFB CULTURE, FUNGAL CULTURE, AEROBIC/ANAEROBIC CULTURE (SURGERY) Raymond Anderson M.D. 4/11/2024  2:24 PM        Estimated Blood Loss: minimal    Findings: see dictation    Complications: none known    PLAN:  --readmit postop  --WBAT LLE  --continue incisional VAC  --start empiric abx therapy, fu intra-op cultures  --anticipate discharge to home with incisional vac pending culture results and appropriate abx therapy        4/11/2024 2:54 PM Raymond Anderson M.D.

## 2024-04-12 ENCOUNTER — APPOINTMENT (OUTPATIENT)
Dept: RADIOLOGY | Facility: MEDICAL CENTER | Age: 49
End: 2024-04-12
Attending: STUDENT IN AN ORGANIZED HEALTH CARE EDUCATION/TRAINING PROGRAM
Payer: COMMERCIAL

## 2024-04-12 PROBLEM — A49.02 MRSA INFECTION: Status: ACTIVE | Noted: 2024-04-12

## 2024-04-12 LAB
EKG IMPRESSION: NORMAL
GRAM STN SPEC: NORMAL
SCCMEC + MECA PNL NOSE NAA+PROBE: NEGATIVE
SIGNIFICANT IND 70042: NORMAL
SITE SITE: NORMAL
SOURCE SOURCE: NORMAL

## 2024-04-12 PROCEDURE — 93005 ELECTROCARDIOGRAM TRACING: CPT | Performed by: STUDENT IN AN ORGANIZED HEALTH CARE EDUCATION/TRAINING PROGRAM

## 2024-04-12 PROCEDURE — 700111 HCHG RX REV CODE 636 W/ 250 OVERRIDE (IP): Performed by: STUDENT IN AN ORGANIZED HEALTH CARE EDUCATION/TRAINING PROGRAM

## 2024-04-12 PROCEDURE — 96366 THER/PROPH/DIAG IV INF ADDON: CPT

## 2024-04-12 PROCEDURE — 700111 HCHG RX REV CODE 636 W/ 250 OVERRIDE (IP): Performed by: ORTHOPAEDIC SURGERY

## 2024-04-12 PROCEDURE — 700102 HCHG RX REV CODE 250 W/ 637 OVERRIDE(OP): Performed by: INTERNAL MEDICINE

## 2024-04-12 PROCEDURE — A9270 NON-COVERED ITEM OR SERVICE: HCPCS | Performed by: STUDENT IN AN ORGANIZED HEALTH CARE EDUCATION/TRAINING PROGRAM

## 2024-04-12 PROCEDURE — 93010 ELECTROCARDIOGRAM REPORT: CPT | Performed by: INTERNAL MEDICINE

## 2024-04-12 PROCEDURE — 70450 CT HEAD/BRAIN W/O DYE: CPT

## 2024-04-12 PROCEDURE — A9270 NON-COVERED ITEM OR SERVICE: HCPCS | Performed by: INTERNAL MEDICINE

## 2024-04-12 PROCEDURE — 700102 HCHG RX REV CODE 250 W/ 637 OVERRIDE(OP): Performed by: STUDENT IN AN ORGANIZED HEALTH CARE EDUCATION/TRAINING PROGRAM

## 2024-04-12 PROCEDURE — 700105 HCHG RX REV CODE 258: Performed by: ORTHOPAEDIC SURGERY

## 2024-04-12 PROCEDURE — A9270 NON-COVERED ITEM OR SERVICE: HCPCS | Performed by: ORTHOPAEDIC SURGERY

## 2024-04-12 PROCEDURE — G0378 HOSPITAL OBSERVATION PER HR: HCPCS

## 2024-04-12 PROCEDURE — 96376 TX/PRO/DX INJ SAME DRUG ADON: CPT

## 2024-04-12 PROCEDURE — 99233 SBSQ HOSP IP/OBS HIGH 50: CPT | Performed by: INTERNAL MEDICINE

## 2024-04-12 PROCEDURE — 700102 HCHG RX REV CODE 250 W/ 637 OVERRIDE(OP): Performed by: ORTHOPAEDIC SURGERY

## 2024-04-12 PROCEDURE — 96375 TX/PRO/DX INJ NEW DRUG ADDON: CPT

## 2024-04-12 RX ORDER — OXYCODONE HYDROCHLORIDE 5 MG/1
5 TABLET ORAL
Status: DISCONTINUED | OUTPATIENT
Start: 2024-04-12 | End: 2024-04-14 | Stop reason: HOSPADM

## 2024-04-12 RX ORDER — SUMATRIPTAN 50 MG/1
100 TABLET, FILM COATED ORAL EVERY 8 HOURS PRN
Status: DISCONTINUED | OUTPATIENT
Start: 2024-04-12 | End: 2024-04-14 | Stop reason: HOSPADM

## 2024-04-12 RX ORDER — MORPHINE SULFATE 4 MG/ML
3 INJECTION INTRAVENOUS ONCE
Status: COMPLETED | OUTPATIENT
Start: 2024-04-12 | End: 2024-04-12

## 2024-04-12 RX ORDER — OXYCODONE HYDROCHLORIDE 10 MG/1
10 TABLET ORAL
Status: DISCONTINUED | OUTPATIENT
Start: 2024-04-12 | End: 2024-04-14 | Stop reason: HOSPADM

## 2024-04-12 RX ORDER — HYDRALAZINE HYDROCHLORIDE 20 MG/ML
20 INJECTION INTRAMUSCULAR; INTRAVENOUS EVERY 6 HOURS PRN
Status: DISCONTINUED | OUTPATIENT
Start: 2024-04-12 | End: 2024-04-14 | Stop reason: HOSPADM

## 2024-04-12 RX ORDER — HYDROMORPHONE HYDROCHLORIDE 1 MG/ML
0.5 INJECTION, SOLUTION INTRAMUSCULAR; INTRAVENOUS; SUBCUTANEOUS
Status: DISCONTINUED | OUTPATIENT
Start: 2024-04-12 | End: 2024-04-14 | Stop reason: HOSPADM

## 2024-04-12 RX ADMIN — VANCOMYCIN HYDROCHLORIDE 1500 MG: 5 INJECTION, POWDER, LYOPHILIZED, FOR SOLUTION INTRAVENOUS at 08:25

## 2024-04-12 RX ADMIN — VANCOMYCIN HYDROCHLORIDE 1500 MG: 5 INJECTION, POWDER, LYOPHILIZED, FOR SOLUTION INTRAVENOUS at 20:07

## 2024-04-12 RX ADMIN — OXYCODONE HYDROCHLORIDE 10 MG: 10 TABLET ORAL at 21:13

## 2024-04-12 RX ADMIN — AMLODIPINE BESYLATE 5 MG: 5 TABLET ORAL at 05:29

## 2024-04-12 RX ADMIN — MORPHINE SULFATE 3 MG: 4 INJECTION, SOLUTION INTRAMUSCULAR; INTRAVENOUS at 01:21

## 2024-04-12 RX ADMIN — KETOROLAC TROMETHAMINE 15 MG: 15 INJECTION, SOLUTION INTRAMUSCULAR; INTRAVENOUS at 11:39

## 2024-04-12 RX ADMIN — KETOROLAC TROMETHAMINE 15 MG: 15 INJECTION, SOLUTION INTRAMUSCULAR; INTRAVENOUS at 16:49

## 2024-04-12 RX ADMIN — ACETAMINOPHEN 1000 MG: 500 TABLET, FILM COATED ORAL at 04:07

## 2024-04-12 RX ADMIN — SUMATRIPTAN SUCCINATE 100 MG: 50 TABLET ORAL at 04:08

## 2024-04-12 RX ADMIN — ACETAMINOPHEN 1000 MG: 500 TABLET, FILM COATED ORAL at 11:39

## 2024-04-12 RX ADMIN — KETOROLAC TROMETHAMINE 15 MG: 15 INJECTION, SOLUTION INTRAMUSCULAR; INTRAVENOUS at 04:08

## 2024-04-12 RX ADMIN — LISINOPRIL 40 MG: 20 TABLET ORAL at 01:05

## 2024-04-12 RX ADMIN — HYDRALAZINE HYDROCHLORIDE 20 MG: 20 INJECTION, SOLUTION INTRAMUSCULAR; INTRAVENOUS at 01:19

## 2024-04-12 ASSESSMENT — PAIN DESCRIPTION - PAIN TYPE
TYPE: ACUTE PAIN;INTRACTABLE PAIN
TYPE: ACUTE PAIN;SURGICAL PAIN
TYPE: ACUTE PAIN;SURGICAL PAIN

## 2024-04-12 ASSESSMENT — ENCOUNTER SYMPTOMS
HEADACHES: 1
MUSCULOSKELETAL NEGATIVE: 1
EYES NEGATIVE: 1
CARDIOVASCULAR NEGATIVE: 1
MYALGIAS: 1
GASTROINTESTINAL NEGATIVE: 1
CONSTIPATION: 1
PSYCHIATRIC NEGATIVE: 1
RESPIRATORY NEGATIVE: 1
WEAKNESS: 1

## 2024-04-12 ASSESSMENT — PATIENT HEALTH QUESTIONNAIRE - PHQ9
SUM OF ALL RESPONSES TO PHQ9 QUESTIONS 1 AND 2: 0
2. FEELING DOWN, DEPRESSED, IRRITABLE, OR HOPELESS: NOT AT ALL
1. LITTLE INTEREST OR PLEASURE IN DOING THINGS: NOT AT ALL

## 2024-04-12 ASSESSMENT — LIFESTYLE VARIABLES
TOTAL SCORE: 0
ON A TYPICAL DAY WHEN YOU DRINK ALCOHOL HOW MANY DRINKS DO YOU HAVE: 0
TOTAL SCORE: 0
HAVE YOU EVER FELT YOU SHOULD CUT DOWN ON YOUR DRINKING: NO
CONSUMPTION TOTAL: NEGATIVE
EVER FELT BAD OR GUILTY ABOUT YOUR DRINKING: NO
HAVE PEOPLE ANNOYED YOU BY CRITICIZING YOUR DRINKING: NO
ALCOHOL_USE: NO
TOTAL SCORE: 0
HOW MANY TIMES IN THE PAST YEAR HAVE YOU HAD 5 OR MORE DRINKS IN A DAY: 0
EVER HAD A DRINK FIRST THING IN THE MORNING TO STEADY YOUR NERVES TO GET RID OF A HANGOVER: NO
AVERAGE NUMBER OF DAYS PER WEEK YOU HAVE A DRINK CONTAINING ALCOHOL: 0

## 2024-04-12 NOTE — PROGRESS NOTES
"    Orthopedic PA Progress Note    Interval changes:  Patient doing well postop  Medicine consulted for comanagement  ID consult placed with Dr. Marcus APPIAH wound vac intact without leak  WBAT LLE  OR cx preliminarily MRSA positive  - Strongly recommend continuation of incisional wound vac due to long history of chronic open wound, dehiscence, and complications  Anticipate discharge when appropriate abx plan in place with assistance from ID and incisional wound vac available for home    ROS - Patient denies any new issues.  Denies any numbness or tingling. Pain well controlled.    /76   Pulse 93   Temp 36.6 °C (97.9 °F) (Temporal)   Resp 16   Ht 1.778 m (5' 10\")   Wt 107 kg (236 lb 15.9 oz)   SpO2 92%     Patient seen and examined  No acute distress  Breathing non labored  RRR  LLE: Wound vac intact without leak. Patient clearly fires tibialis anterior, EHL, and gastrocnemius/soleus. Sensation is intact to light touch throughout superficial peroneal, deep peroneal, tibial, saphenous, and sural nerve distributions. Strong and palpable 2+ dorsalis pedis and posterior tibial pulses with capillary refill less than 2 seconds.          Active Hospital Problems    Diagnosis     Open leg wound, left, subsequent encounter [S81.802D]     Hypertensive urgency [I16.0]        Assessment/Plan:  Patient doing well postop  Medicine consulted for comanagement  ID consult placed with Dr. Marcus APPIAH wound vac intact without leak  WBAT LLE  OR cx preliminarily MRSA positive  - Strongly recommend continuation of incisional wound vac due to long history of chronic open wound, dehiscence, and complications  Anticipate discharge when appropriate abx plan in place with assistance from ID and incisional wound vac available for home    POD#1 S/p  1.  Excisional debridement of left leg wound measuring 1x0.5 cm including   skin, subcutaneous tissue.  2.  Application of skin graft substitute left leg wound.  3.  Secondary " closure left leg chronic recurrent open wound.  4.  Application of wound VAC, left leg less than 50 cm2 (non-disposable   incisional VAC).  Wt bearing status - WBAT RLE  Wound care/Drains - Dressings to be changed every other day by nursing. Or PRN for saturation starting POD#2  Future Procedures - None planned   Lovenox: Ambulatory and mobile  Sutures/Staples out- 14-21 days post operatively. Removal will completed by ortho DENNY's unless transferred.  DVT Prophylaxis outpatient: ASA 81 mg PO BID x4 weeks  PT/OT-initiated  Antibiotics: Vancomycin; per ID recs   DVT Prophylaxis- TEDS/SCDs/Foot pumps  Rose-not needed per ortho  Case Coordination for Discharge Planning - Disposition per therapy recs.

## 2024-04-12 NOTE — CONSULTS
"Hospital Medicine Consultation    Date of Service  4/11/2024    Referring Physician  Raymond Anderson M.D.    Consulting Physician  Taran Woods M.D.    Reason for Consultation  Hypertensive urgency    History of Presenting Illness  49 y.o. male who presented 4/11/2024 with a headache s/p excisional debridement of left leg wound.    Patient has history of chronic recurrent open wound of his left lower extremity.  He was taken to the OR today by orthopedic surgery and had excisional debridement of left leg wound measuring 1 x 0.5 cm, including skin and subcutaneous tissue.  Application of skin graft and left leg wound and wound VAC was applied.  Postoperatively, patient began to complain of headaches.  He has history of migraines but states that this felt different.  He states that it felt like \" his head was going to explode.\"  he was found to have heart rate 105, and systolic blood pressure into 210s.  Patient had been given 3 10 mg doses of labetalol within the course of an hour.  Blood pressure continued to remain high, thus medicine was consulted for management of hypertensive urgency.    Review of Systems  Review of Systems   Constitutional:  Positive for malaise/fatigue.   HENT: Negative.     Eyes: Negative.    Respiratory: Negative.     Cardiovascular: Negative.    Gastrointestinal: Negative.    Genitourinary: Negative.    Musculoskeletal: Negative.    Skin: Negative.    Neurological:  Positive for weakness.   Endo/Heme/Allergies: Negative.    Psychiatric/Behavioral: Negative.         Past Medical History   has a past medical history of Chronic back pain, Chronic infection (07/2023), CRPS (complex regional pain syndrome type I) (2013), Elevated BP without diagnosis of hypertension, Kidney stones (1995), Nerve pain, and Pain (02/18/2015).    He has no past medical history of CAD (coronary artery disease), COPD, Infectious disease, Liver disease, Renal disorder, or Seizure disorder ([facility]).    Surgical " History   has a past surgical history that includes pr inject nerv blck,stellate ganglion (11/26/2014); pr inject nerv blck,stellate ganglion (12/03/2014); pr inject nerv blck,stellate ganglion (12/10/2014); pr percut implnt neuroelect,epidural (02/04/2015); pr percut implnt neuroelect,epidural (02/04/2015); spinal cord stimulator (02/19/2015); gastroscopy with biopsy (06/22/2013); other orthopedic surgery (2003); pr decompress ant/lat+post leg cmpart (Left, 06/29/2023); incision and drainage orthopedic (06/29/2023); fasciotomy (Left, 07/02/2023); pr debride skin subq tissue first 20 sq cm (Left, 09/09/2023); pr secd clos surg wnd exten/complic (Left, 09/09/2023); spinal cord stimulator (2023); and irrigation & debridement general (12/7/2023).    Family History  family history includes Cancer in an other family member; Hypertension in an other family member.    Social History   reports that he quit smoking about 4 years ago. His smoking use included cigarettes. He started smoking about 35 years ago. He has a 15.5 pack-year smoking history. He has been exposed to tobacco smoke. He has never used smokeless tobacco. He reports that he does not currently use alcohol. He reports that he does not use drugs.    Medications  Prior to Admission Medications   Prescriptions Last Dose Informant Patient Reported? Taking?   Naloxone (NARCAN) 4 MG/0.1ML Liquid PRN at PRN Patient Yes No   Sig: Administer 1 Spray into affected nostril(S) one time.   Naproxen Sodium (ALEVE) 220 MG Cap 4/8/2024 at PRN Patient Yes No   Sig: Take 440 mg by mouth 2 times a day as needed (pain).   SUMAtriptan (IMITREX) 50 MG Tab   Yes Yes   Sig: Take 100 mg by mouth one time as needed for Migraine.      Facility-Administered Medications: None       Allergies  No Known Allergies    Physical Exam  Temp:  [36.1 °C (97 °F)-36.9 °C (98.4 °F)] 36.2 °C (97.2 °F)  Pulse:  [] 105  Resp:  [16-25] 25  BP: (152-212)/() 168/111  SpO2:  [91 %-97 %] 94  %    Physical Exam  Constitutional:       Appearance: Normal appearance. He is obese.   HENT:      Head: Normocephalic.      Nose: Nose normal.      Mouth/Throat:      Mouth: Mucous membranes are moist.   Eyes:      Pupils: Pupils are equal, round, and reactive to light.   Cardiovascular:      Rate and Rhythm: Normal rate and regular rhythm.      Pulses: Normal pulses.   Pulmonary:      Effort: Pulmonary effort is normal.      Breath sounds: Normal breath sounds.   Abdominal:      General: Abdomen is flat. Bowel sounds are normal.      Palpations: Abdomen is soft.   Musculoskeletal:      Cervical back: Neck supple.      Comments: LLE in dressing, attached to wound vac   Skin:     General: Skin is warm.   Neurological:      General: No focal deficit present.      Mental Status: He is alert and oriented to person, place, and time. Mental status is at baseline.   Psychiatric:         Mood and Affect: Mood normal.         Behavior: Behavior normal.         Thought Content: Thought content normal.         Judgment: Judgment normal.         Fluids  Date 04/11/24 0700 - 04/12/24 0659   Shift [id number] 0923-1827 0717-7372 24 Hour Total   INTAKE   Shift Total       OUTPUT   Urine   525 525   Blood 5   5   Shift Total 5  525 530   Weight (kg) 107.5 107.5 107.5 107.5       Laboratory      Recent Labs     04/11/24  1816   SODIUM 137   POTASSIUM 4.1   CHLORIDE 102   CO2 22   GLUCOSE 130*   BUN 12   CREATININE 0.72   CALCIUM 8.7                     Imaging  CT-HEAD W/O    (Results Pending)       Assessment/Plan  * Hypertensive urgency  Assessment & Plan  Patient with history of hypertension presents for headache and uncontrolled blood pressure shortly after excisional debridement of his left leg wound, has somewhat responded to 30 mg labetalol. /105 upon my assessment with patient.  Stat CT head to rule out ICH, stat EKG  Resume home medications (Norvasc 5 mg po daily and lisinopril 40 mg po daily)  Start IV hydralazine  prn  Should blood pressure continue to remain refractory, may need Cardene drip            Open leg wound, left, subsequent encounter- (present on admission)  Assessment & Plan  Status post excisional debridement and application of skin graft and wound VAC, postop day 0  Orthopedic surgery follow-up

## 2024-04-12 NOTE — ASSESSMENT & PLAN NOTE
4/14/2024  Patient with history of hypertension presents for headache and uncontrolled blood pressure shortly after excisional debridement of his left leg wound, has somewhat responded to 30 mg labetalol. /105 upon my assessment with patient.  Stat CT head to rule out ICH, stat EKG  Resume home medications (Norvasc 5 mg po daily and lisinopril 40 mg po daily)  Start IV hydralazine prn  Should blood pressure continue to remain refractory, may need Cardene drip

## 2024-04-12 NOTE — CARE PLAN
The patient is Watcher - Medium risk of patient condition declining or worsening    Shift Goals  Clinical Goals: pain control, BP management  Patient Goals: pain control, comfort, sleep  Family Goals: updates, pt comfort    Progress made toward(s) clinical / shift goals:    Problem: Pain - Standard  Goal: Alleviation of pain or a reduction in pain to the patient’s comfort goal  Outcome: Progressing     Problem: Knowledge Deficit - Standard  Goal: Patient and family/care givers will demonstrate understanding of plan of care, disease process/condition, diagnostic tests and medications  Outcome: Progressing       Patient is not progressing towards the following goals:

## 2024-04-12 NOTE — PROGRESS NOTES
2030: Reached out to Dr. Anderson regarding trends of SBP > 160 throughout the day. Requested BP medications PRN. Patient also experiencing 10/10 migraine that is treated at home with 100 mg Imitrex PRN, requested for reorder of medications    2140: Dr. Anderson reached out through [facility] placed orders.

## 2024-04-12 NOTE — CARE PLAN
Problem: Pain - Standard  Goal: Alleviation of pain or a reduction in pain to the patient’s comfort goal  Outcome: Progressing     Problem: Knowledge Deficit - Standard  Goal: Patient and family/care givers will demonstrate understanding of plan of care, disease process/condition, diagnostic tests and medications  Outcome: Progressing     The patient is Watcher - Medium risk of patient condition declining or worsening    Shift Goals  Clinical Goals: Pain control, mobility  Patient Goals: Pain control    Progress made toward(s) clinical / shift goals:  Taught pt 0-10 pain scale and non-pharmacological method of pain management, encouraged to verbalize when in pain. Administered PRN pain meds as needed. Mobilizing SBA - x1 assist without DME, tolerates well.     Patient is not progressing towards the following goals:

## 2024-04-12 NOTE — PROGRESS NOTES
Hospital Medicine Daily Progress Note-consultation    Date of Service  4/12/2024    Chief Complaint  Jarod Fields is a 49 y.o. male admitted 4/11/2024 with No chief complaint on file.        Hospital Course  No notes on file    Interval Problem Update  Patient was seen and examined at bedside.  I have personally reviewed and interpreted vitals, labs, and imaging.    4/12.  Afebrile.  Hypertension is improved.  On 0-2 L nasal cannula.  Denies fevers, chills, chest pains, shortness of breath.  Blood pressure is better controlled and resumed home meds.  Headache currently has resolved.  OR. Wound culture grew MRSA.  Continue vancomycin.  Monitor closely for toxicity.  Consulted ID.    I have discussed this patient's plan of care and discharge plan at IDT rounds today with Case Management, Nursing, Nursing leadership, and other members of the IDT team.    Consultants/Specialty  Ortho is primary    Code Status  Full Code    Disposition  The patient is not medically cleared for discharge to home or a post-acute facility.  Anticipate discharge to: home with organized home healthcare and close outpatient follow-up    I have placed the appropriate orders for post-discharge needs.    Review of Systems  Review of Systems   Gastrointestinal:  Positive for constipation.   Musculoskeletal:  Positive for myalgias.   Neurological:  Positive for headaches.        Physical Exam  Temp:  [36.1 °C (97 °F)-36.9 °C (98.4 °F)] 36.2 °C (97.2 °F)  Pulse:  [] 93  Resp:  [16-25] 17  BP: (144-212)/() 146/97  SpO2:  [91 %-97 %] 95 %    Physical Exam  Vitals and nursing note reviewed.   Constitutional:       Appearance: Normal appearance. He is obese. He is ill-appearing.   HENT:      Head: Normocephalic and atraumatic.      Nose: Nose normal.      Mouth/Throat:      Mouth: Mucous membranes are moist.      Pharynx: Oropharynx is clear.   Eyes:      Extraocular Movements: Extraocular movements intact.      Conjunctiva/sclera:  Conjunctivae normal.   Cardiovascular:      Rate and Rhythm: Normal rate and regular rhythm.      Pulses: Normal pulses.      Heart sounds: Normal heart sounds. No murmur heard.     No friction rub. No gallop.   Pulmonary:      Effort: Pulmonary effort is normal. No respiratory distress.      Breath sounds: Normal breath sounds. No wheezing or rales.   Chest:      Chest wall: No tenderness.   Abdominal:      General: Abdomen is flat. Bowel sounds are normal. There is no distension.      Palpations: Abdomen is soft. There is no mass.      Tenderness: There is no abdominal tenderness. There is no guarding.   Musculoskeletal:         General: Normal range of motion.      Cervical back: Normal range of motion and neck supple.      Comments: Right lower extremity wound with wound VAC   Skin:     General: Skin is warm.      Capillary Refill: Capillary refill takes less than 2 seconds.   Neurological:      General: No focal deficit present.      Mental Status: He is alert and oriented to person, place, and time. Mental status is at baseline.      Cranial Nerves: No cranial nerve deficit.      Motor: No weakness.   Psychiatric:         Mood and Affect: Mood normal.         Behavior: Behavior normal.         Thought Content: Thought content normal.         Judgment: Judgment normal.         Fluids    Intake/Output Summary (Last 24 hours) at 4/12/2024 0744  Last data filed at 4/12/2024 0458  Gross per 24 hour   Intake --   Output 890 ml   Net -890 ml       Laboratory      Recent Labs     04/11/24  1816   SODIUM 137   POTASSIUM 4.1   CHLORIDE 102   CO2 22   GLUCOSE 130*   BUN 12   CREATININE 0.72   CALCIUM 8.7                   Imaging  CT-HEAD W/O   Final Result      There is no definite acute intracranial abnormality.              Assessment/Plan  * Hypertensive urgency  Assessment & Plan  4/12/2024  Patient with history of hypertension presents for headache and uncontrolled blood pressure shortly after excisional  debridement of his left leg wound, has somewhat responded to 30 mg labetalol. /105 upon my assessment with patient.  Stat CT head to rule out ICH, stat EKG  Resume home medications (Norvasc 5 mg po daily and lisinopril 40 mg po daily)  Start IV hydralazine prn  Should blood pressure continue to remain refractory, may need Cardene drip    MRSA infection  Assessment & Plan  4/12/2024  OR wound culture growing MRSA  ID consulted and following  Continue vancomycin.  Monitor closely for toxicity.    Open leg wound, left, subsequent encounter- (present on admission)  Assessment & Plan  4/12/2024  Status post excisional debridement and application of skin graft and wound VAC, postop day 0  Orthopedic surgery follow-up         VTE prophylaxis: Had recent surgery    I have performed a physical exam and reviewed and updated ROS and Plan today (4/12/2024). In review of yesterday's note (4/11/2024), there are no changes except as documented above.    Greater than 50 minutes spent prepping to see patient (e.g. review of tests) obtaining and/or reviewing separately obtained history. Performing a medically appropriate examination and/ evaluation.  Counseling and educating the patient/family/caregiver.  Ordering medications, tests, or procedures.  Referring and communicating with other health care professionals.  Documenting clinical information in EPIC.  Independently interpreting results and communicating results to patient/family/caregiver.  Care coordination.

## 2024-04-12 NOTE — OR NURSING
PACU note- Patient arrived from the OR, breathing easily, alert and oriented. Surgical site assessed, wound vac in place and suctioning. Able to move all extremities, strong radial and pedal pulses, good capillary refill, denies numbness or tingling. Medicated for pain with good effect. Tolerating PO fluids without nausea. Called and updated family. Report called to the floor.

## 2024-04-12 NOTE — PROGRESS NOTES
Was informed that the patient's BP was elevated and currently no BP medications were ordered.    2040 Paged Dr. Anderson through voalte to update him of the patient's high BP and that the patient was experiencing severe migraine pain.   2120 After not getting response from Dr. Anderson, the bedside RN called through the answering service, to page the on-call, and was told the on call MD taking calls was Dr. Anderson.   2140 Dr. Anderson messaged this RN through Volate inquiring on the situation, asking if the patient had diagnosed hypertension which was not based upon the patient's history in EPIC.   Dr. Anderson stated he would place orders for both BP medications and Imitrex for the patient's migraine/ headache.   2150 Medications ordered by MD:  Labetalol  IV push 20 mg x3 every 20 minutes for BP over 160.   Imitrex 100 mg once PRN   2215 Mediations not verified by pharmacy yet so this RN called and spoke to pharmacist to get medications verified.     2233 Patient crying in pain BP checked 212/138, Labetalol dose given along with toraldol 15 mg, tylenol, and dilaudid 0.25 mg,  2253 Rechecked /119 next dose of labetalol given   2300 and it was 168/111, 3rd dose of labetalol given  Patient stating his pain down from a 10/10 to a 4-5/10     2330 /105 Paged Dr. Anderson and informed him that BP was sill elevated, and if we can get orders for additional; BP medications and possible additional antihypertensive medications. Dr. Anderson going to consult hospitalist and get a CT scan.   Dr. Love came to bedside, spoke to patient and family and ordered medications, EKG and stat CT scan.     0100 patient returned from CT, checked BP, 160/97, gave scheduled lisinopril 40 mg, PRN hydralazine 20 mg, and paged Dr. Love of BP and to inform him patient starting to experiencing worsening headache again, stating 7/10 pain. IV morphine 3 mg ordered by MD and given.

## 2024-04-12 NOTE — PROGRESS NOTES
Pharmacy Vancomycin Kinetics Note for 4/11/2024     49 y.o. male on Vancomycin day # 1     Vancomycin Indication (AUC Dosing): Skin/skin structure infection    Provider specified end date: 04/18/24    Active Antibiotics (From admission, onward)      Ordered     Ordering Provider       Thu Apr 11, 2024  7:39 PM    04/11/24 1939  vancomycin (Vancocin) 1,500 mg in  mL IVPB  (vancomycin (VANCOCIN) IV (LD + Maintenance))  EVERY 12 HOURS         Raymond Anderson M.D.       Thu Apr 11, 2024  5:21 PM    04/11/24 1721  vancomycin (Vancocin) 2,750 mg in  mL IVPB  (vancomycin (VANCOCIN) IV (LD + Maintenance))  ONCE        Note to Pharmacy: Per P&T Kinetics Protocol    Raymond Anderson M.D.       Thu Apr 11, 2024  5:02 PM    04/11/24 1702  MD Alert...Vancomycin per Pharmacy  (Abscess/Cellulitis )  PHARMACY TO DOSE        Question:  Indication(s) for vancomycin?  Answer:  Skin and soft tissue infection    Raymond Anderson M.D.            Dosing Weight: 107 kg (235 lb 14.3 oz)      Admission History: Admitted on 4/11/2024 for Open leg wound, left, subsequent encounter [M85.269S]  Pertinent history: patient admitted for I&D of reccurent lower extremity wound with skin grafting done 4/11.    Allergies:     Patient has no known allergies.     Pertinent cultures to date:     Results       Procedure Component Value Units Date/Time    MRSA By PCR (Amp) [[id number]]     Order Status: No result Specimen: Respirate from Nares     CULTURE WOUND W/ GRAM STAIN [[id number]] Collected: 04/11/24 1424    Order Status: No result Specimen: Wound Updated: 04/11/24 1459     Significant Indicator NEG     Source WND     Site L leg wound     Culture Result -     Gram Stain Result -    Narrative:      Surgery - swabs received    Anaerobic Culture [[id number]] Collected: 04/11/24 1424    Order Status: No result Specimen: Wound Updated: 04/11/24 1459     Significant Indicator NEG     Source WND     Site L leg wound     Culture Result -     "Narrative:      Surgery - swabs received    Fungal Culture [[id number]] Collected: 24 1424    Order Status: No result Specimen: Wound Updated: 24 1459     Significant Indicator NEG     Source WND     Site L leg wound     Culture Result -     Fungal Smear Results -    Narrative:      Surgery - swabs received            Labs:     Estimated Creatinine Clearance: 154.4 mL/min (by C-G formula based on SCr of 0.72 mg/dL).  No results for input(s): \"WBC\", \"NEUTSPOLYS\", \"BANDSSTABS\" in the last 72 hours.  Recent Labs     24  1816   BUN 12   CREATININE 0.72       Intake/Output Summary (Last 24 hours) at 2024 1939  Last data filed at 2024 1454  Gross per 24 hour   Intake --   Output 5 ml   Net -5 ml      BP (!) 167/113   Pulse 76   Temp 36.2 °C (97.2 °F) (Temporal)   Resp 17   Ht 1.778 m (5' 10\")   Wt 107 kg (236 lb 15.9 oz)   SpO2 96%  Temp (24hrs), Av.5 °C (97.7 °F), Min:36.2 °C (97.2 °F), Max:36.9 °C (98.4 °F)      List concerns for Vancomycin clearance:     Obesity    Pharmacokinetics: AUC Dosing    AUC kinetics:   Ke (hr ^-1): 0.1322 hr^-1  Half life: 5.24 hr  Clearance: 6.886  Estimated TDD: 3443  Estimated Dose: 1033  Estimated interval: 7.2    A/P:     -  Vancomycin dose: 1500mg q12h     -  Next vancomycin level(s): Not currently ordered, consider after 4th maintenance dose.       -  Predicted vancomycin AUC from initial AUC test calculator: 436 mg·hr/L      -  Comments: MRSA nares ordered. Please continue to monitor renal function, urine output and vancomycin levels for dosing adjustments. Please also follow cultures and signs of clinical cure for de-escalation of therapy.       Agnieszka Rodriguez, PharmD    "

## 2024-04-12 NOTE — ASSESSMENT & PLAN NOTE
4/14/2024  Status post excisional debridement and application of skin graft and wound VAC, postop day 0  Orthopedic surgery follow-up

## 2024-04-12 NOTE — DISCHARGE PLANNING
Case Management Discharge Planning    Admission Date: 4/11/2024  GMLOS:    ALOS: 0    6-Clicks ADL Score:    6-Clicks Mobility Score:        Anticipated Discharge Dispo:      DME Needed: Yes    DME Ordered: Yes    Action(s) Taken: Pt was discussed during morning rounds. DME order was placed for a wound vac. LMSW completed [employer] V[employer] therapy ins auth form, sent Voalte to M.D. and requested signature. Called Stacy with [employer] to notify start of process. Stacy stated to fax completed form with facesheet and follow up once faxed.    UPDATE 4412  Received signature from ROS for [employer] therapy ins auth form. Faxed form to Stacy TORRES at [phone] and called to confirm. Stacy stated she would look out for it and follow up with any updates.    UPDATE 3783  Received VM from Stacy TORRES who stated she received approval for the wound vac and should be delivered by end of day today.     Escalations Completed: None    Medically Clear: No    Next Steps: LMSW to follow for wound vac delivery.    Barriers to Discharge: Medical clearance and DME    Is the patient up for discharge tomorrow: No

## 2024-04-12 NOTE — PROGRESS NOTES
8009 Report received from Yesi VALLE.    2572 Pt arrived to unit from PACU. Pt transferred from [facility] to hospital bed with SBA. Pt AAOx4, VSS on 2L oxygen. Surgical dressing to left leg CDI. Pt oriented to room and to call light. Discussed plan of care. All needs met at this time. Bed locked and in lowest position.

## 2024-04-12 NOTE — CARE PLAN
Problem: Pain - Standard  Goal: Alleviation of pain or a reduction in pain to the patient’s comfort goal  Outcome: Progressing     Problem: Knowledge Deficit - Standard  Goal: Patient and family/care givers will demonstrate understanding of plan of care, disease process/condition, diagnostic tests and medications  Outcome: Progressing       The patient is Watcher - Medium risk of patient condition declining or worsening    Shift Goals  Clinical Goals: Pain control, hemodynamic stability  Patient Goals: Pain control  Family Goals: updates, pt comfort    Progress made toward(s) clinical / shift goals:  Taught pt 0-10 pain scale and non-pharmacological method of pain management, encouraged to verbalize when in pain. Administered PRN pain meds as needed. Monitoring trends to vitals every 4 hours.     Patient is not progressing towards the following goals:

## 2024-04-12 NOTE — CONSULTS
Patient with chronic wound and status post debridement by orthopedic surgery.  Cultures so far positive for MRSA.    --- Continue vancomycin  --- Follow-up OR cultures    ID will see the patient in the a.m.    Silvia Velazquez MD

## 2024-04-13 DIAGNOSIS — A49.02 MRSA INFECTION: ICD-10-CM

## 2024-04-13 LAB
ALBUMIN SERPL BCP-MCNC: 4.1 G/DL (ref 3.2–4.9)
ALBUMIN/GLOB SERPL: 1.7 G/DL
ALP SERPL-CCNC: 53 U/L (ref 30–99)
ALT SERPL-CCNC: 51 U/L (ref 2–50)
ANION GAP SERPL CALC-SCNC: 14 MMOL/L (ref 7–16)
AST SERPL-CCNC: 25 U/L (ref 12–45)
BASOPHILS # BLD AUTO: 0.3 % (ref 0–1.8)
BASOPHILS # BLD: 0.02 K/UL (ref 0–0.12)
BILIRUB SERPL-MCNC: 0.3 MG/DL (ref 0.1–1.5)
BUN SERPL-MCNC: 12 MG/DL (ref 8–22)
CALCIUM ALBUM COR SERPL-MCNC: 8.3 MG/DL (ref 8.5–10.5)
CALCIUM SERPL-MCNC: 8.4 MG/DL (ref 8.5–10.5)
CHLORIDE SERPL-SCNC: 104 MMOL/L (ref 96–112)
CO2 SERPL-SCNC: 21 MMOL/L (ref 20–33)
CREAT SERPL-MCNC: 0.6 MG/DL (ref 0.5–1.4)
EOSINOPHIL # BLD AUTO: 0.06 K/UL (ref 0–0.51)
EOSINOPHIL NFR BLD: 0.9 % (ref 0–6.9)
ERYTHROCYTE [DISTWIDTH] IN BLOOD BY AUTOMATED COUNT: 45.1 FL (ref 35.9–50)
GFR SERPLBLD CREATININE-BSD FMLA CKD-EPI: 119 ML/MIN/1.73 M 2
GLOBULIN SER CALC-MCNC: 2.4 G/DL (ref 1.9–3.5)
GLUCOSE SERPL-MCNC: 114 MG/DL (ref 65–99)
HCT VFR BLD AUTO: 45.6 % (ref 42–52)
HGB BLD-MCNC: 16.1 G/DL (ref 14–18)
IMM GRANULOCYTES # BLD AUTO: 0.03 K/UL (ref 0–0.11)
IMM GRANULOCYTES NFR BLD AUTO: 0.4 % (ref 0–0.9)
LYMPHOCYTES # BLD AUTO: 1.85 K/UL (ref 1–4.8)
LYMPHOCYTES NFR BLD: 26.7 % (ref 22–41)
MAGNESIUM SERPL-MCNC: 1.8 MG/DL (ref 1.5–2.5)
MCH RBC QN AUTO: 33.4 PG (ref 27–33)
MCHC RBC AUTO-ENTMCNC: 35.3 G/DL (ref 32.3–36.5)
MCV RBC AUTO: 94.6 FL (ref 81.4–97.8)
MONOCYTES # BLD AUTO: 0.39 K/UL (ref 0–0.85)
MONOCYTES NFR BLD AUTO: 5.6 % (ref 0–13.4)
NEUTROPHILS # BLD AUTO: 4.59 K/UL (ref 1.82–7.42)
NEUTROPHILS NFR BLD: 66.1 % (ref 44–72)
NRBC # BLD AUTO: 0 K/UL
NRBC BLD-RTO: 0 /100 WBC (ref 0–0.2)
PHOSPHATE SERPL-MCNC: 2.7 MG/DL (ref 2.5–4.5)
PLATELET # BLD AUTO: 207 K/UL (ref 164–446)
PMV BLD AUTO: 9.3 FL (ref 9–12.9)
POTASSIUM SERPL-SCNC: 3.5 MMOL/L (ref 3.6–5.5)
PROT SERPL-MCNC: 6.5 G/DL (ref 6–8.2)
RBC # BLD AUTO: 4.82 M/UL (ref 4.7–6.1)
SODIUM SERPL-SCNC: 139 MMOL/L (ref 135–145)
WBC # BLD AUTO: 6.9 K/UL (ref 4.8–10.8)

## 2024-04-13 PROCEDURE — 96376 TX/PRO/DX INJ SAME DRUG ADON: CPT

## 2024-04-13 PROCEDURE — 99223 1ST HOSP IP/OBS HIGH 75: CPT | Performed by: INTERNAL MEDICINE

## 2024-04-13 PROCEDURE — 700102 HCHG RX REV CODE 250 W/ 637 OVERRIDE(OP): Performed by: STUDENT IN AN ORGANIZED HEALTH CARE EDUCATION/TRAINING PROGRAM

## 2024-04-13 PROCEDURE — A9270 NON-COVERED ITEM OR SERVICE: HCPCS | Performed by: INTERNAL MEDICINE

## 2024-04-13 PROCEDURE — 96366 THER/PROPH/DIAG IV INF ADDON: CPT

## 2024-04-13 PROCEDURE — 700111 HCHG RX REV CODE 636 W/ 250 OVERRIDE (IP): Performed by: ORTHOPAEDIC SURGERY

## 2024-04-13 PROCEDURE — 83735 ASSAY OF MAGNESIUM: CPT

## 2024-04-13 PROCEDURE — 36415 COLL VENOUS BLD VENIPUNCTURE: CPT

## 2024-04-13 PROCEDURE — 700102 HCHG RX REV CODE 250 W/ 637 OVERRIDE(OP): Performed by: INTERNAL MEDICINE

## 2024-04-13 PROCEDURE — A9270 NON-COVERED ITEM OR SERVICE: HCPCS | Performed by: STUDENT IN AN ORGANIZED HEALTH CARE EDUCATION/TRAINING PROGRAM

## 2024-04-13 PROCEDURE — 700105 HCHG RX REV CODE 258: Performed by: ORTHOPAEDIC SURGERY

## 2024-04-13 PROCEDURE — 84100 ASSAY OF PHOSPHORUS: CPT

## 2024-04-13 PROCEDURE — 80053 COMPREHEN METABOLIC PANEL: CPT

## 2024-04-13 PROCEDURE — G0378 HOSPITAL OBSERVATION PER HR: HCPCS

## 2024-04-13 PROCEDURE — 700102 HCHG RX REV CODE 250 W/ 637 OVERRIDE(OP): Performed by: ORTHOPAEDIC SURGERY

## 2024-04-13 PROCEDURE — 99233 SBSQ HOSP IP/OBS HIGH 50: CPT | Performed by: INTERNAL MEDICINE

## 2024-04-13 PROCEDURE — 700111 HCHG RX REV CODE 636 W/ 250 OVERRIDE (IP): Performed by: STUDENT IN AN ORGANIZED HEALTH CARE EDUCATION/TRAINING PROGRAM

## 2024-04-13 PROCEDURE — A9270 NON-COVERED ITEM OR SERVICE: HCPCS | Performed by: ORTHOPAEDIC SURGERY

## 2024-04-13 PROCEDURE — 85025 COMPLETE CBC W/AUTO DIFF WBC: CPT

## 2024-04-13 RX ORDER — SULFAMETHOXAZOLE AND TRIMETHOPRIM 800; 160 MG/1; MG/1
2 TABLET ORAL EVERY 12 HOURS
Status: DISCONTINUED | OUTPATIENT
Start: 2024-04-13 | End: 2024-04-14 | Stop reason: HOSPADM

## 2024-04-13 RX ORDER — POTASSIUM CHLORIDE 20 MEQ/1
40 TABLET, EXTENDED RELEASE ORAL ONCE
Status: COMPLETED | OUTPATIENT
Start: 2024-04-13 | End: 2024-04-13

## 2024-04-13 RX ORDER — LANOLIN ALCOHOL/MO/W.PET/CERES
400 CREAM (GRAM) TOPICAL 2 TIMES DAILY
Status: COMPLETED | OUTPATIENT
Start: 2024-04-13 | End: 2024-04-13

## 2024-04-13 RX ADMIN — SUMATRIPTAN SUCCINATE 100 MG: 50 TABLET ORAL at 09:48

## 2024-04-13 RX ADMIN — OXYCODONE 5 MG: 5 TABLET ORAL at 04:37

## 2024-04-13 RX ADMIN — DOCUSATE SODIUM 100 MG: 100 CAPSULE, LIQUID FILLED ORAL at 18:23

## 2024-04-13 RX ADMIN — AMLODIPINE BESYLATE 5 MG: 5 TABLET ORAL at 04:37

## 2024-04-13 RX ADMIN — SUMATRIPTAN SUCCINATE 100 MG: 50 TABLET ORAL at 18:27

## 2024-04-13 RX ADMIN — OXYCODONE 5 MG: 5 TABLET ORAL at 00:45

## 2024-04-13 RX ADMIN — POTASSIUM CHLORIDE 40 MEQ: 1500 TABLET, EXTENDED RELEASE ORAL at 07:47

## 2024-04-13 RX ADMIN — SUMATRIPTAN SUCCINATE 100 MG: 50 TABLET ORAL at 00:45

## 2024-04-13 RX ADMIN — DIBASIC SODIUM PHOSPHATE, MONOBASIC POTASSIUM PHOSPHATE AND MONOBASIC SODIUM PHOSPHATE 500 MG: 852; 155; 130 TABLET ORAL at 11:55

## 2024-04-13 RX ADMIN — ONDANSETRON 4 MG: 2 INJECTION INTRAMUSCULAR; INTRAVENOUS at 08:51

## 2024-04-13 RX ADMIN — OXYCODONE HYDROCHLORIDE 10 MG: 10 TABLET ORAL at 08:30

## 2024-04-13 RX ADMIN — ACETAMINOPHEN 1000 MG: 500 TABLET, FILM COATED ORAL at 11:55

## 2024-04-13 RX ADMIN — SULFAMETHOXAZOLE AND TRIMETHOPRIM 2 TABLET: 800; 160 TABLET ORAL at 18:23

## 2024-04-13 RX ADMIN — HYDRALAZINE HYDROCHLORIDE 20 MG: 20 INJECTION, SOLUTION INTRAMUSCULAR; INTRAVENOUS at 07:47

## 2024-04-13 RX ADMIN — VANCOMYCIN HYDROCHLORIDE 1500 MG: 5 INJECTION, POWDER, LYOPHILIZED, FOR SOLUTION INTRAVENOUS at 07:54

## 2024-04-13 RX ADMIN — Medication 400 MG: at 18:24

## 2024-04-13 RX ADMIN — ACETAMINOPHEN 1000 MG: 500 TABLET, FILM COATED ORAL at 18:23

## 2024-04-13 RX ADMIN — LISINOPRIL 40 MG: 20 TABLET ORAL at 04:36

## 2024-04-13 RX ADMIN — DIBASIC SODIUM PHOSPHATE, MONOBASIC POTASSIUM PHOSPHATE AND MONOBASIC SODIUM PHOSPHATE 500 MG: 852; 155; 130 TABLET ORAL at 07:47

## 2024-04-13 RX ADMIN — Medication 400 MG: at 07:47

## 2024-04-13 ASSESSMENT — ENCOUNTER SYMPTOMS
NERVOUS/ANXIOUS: 0
BLURRED VISION: 0
CONSTIPATION: 0
HEADACHES: 1
CONSTIPATION: 1
COUGH: 0
VOMITING: 0
FEVER: 0
ABDOMINAL PAIN: 0
SPUTUM PRODUCTION: 0
DOUBLE VISION: 0
WEAKNESS: 0
DIARRHEA: 0
SHORTNESS OF BREATH: 0
NAUSEA: 0
MYALGIAS: 1

## 2024-04-13 ASSESSMENT — PAIN DESCRIPTION - PAIN TYPE
TYPE: ACUTE PAIN;SURGICAL PAIN
TYPE: ACUTE PAIN

## 2024-04-13 NOTE — CARE PLAN
The patient is Stable - Low risk of patient condition declining or worsening    Shift Goals  Clinical Goals: pain control, hemodynamic stability  Patient Goals: pain conrtol, rest, comfort  Family Goals: updates, pt comfort    Progress made toward(s) clinical / shift goals:    Problem: Pain - Standard  Goal: Alleviation of pain or a reduction in pain to the patient’s comfort goal  Outcome: Progressing     Problem: Knowledge Deficit - Standard  Goal: Patient and family/care givers will demonstrate understanding of plan of care, disease process/condition, diagnostic tests and medications  Outcome: Progressing       Patient is not progressing towards the following goals:

## 2024-04-13 NOTE — PROGRESS NOTES
0740-Patient is A&Ox4. No complaints of pain. Plan of care discussed with the patient, all concerns addressed. Call light is within reach and patient educated on fall precautions, verbalized and demonstrated understanding. Bed in lowest and locked position. Hourly rounding in place.    0855- Patient complaint of headache followed by vomiting. Zofran given and vancomycin stopped. Provider notified of vanco pump stop and patient symptoms.

## 2024-04-13 NOTE — ASSESSMENT & PLAN NOTE
4/14/2024  OR wound culture growing MRSA  ID consulted and following  Continue vancomycin.  Monitor closely for toxicity.  Switch to Bactrim due to likely reaction to vancomycin

## 2024-04-13 NOTE — PROGRESS NOTES
"      Orthopaedic Progress Note    Interval changes:  Patient doing well   IV abx stopped due to itching and headache  ID team recommending po Bactrim- will DC home tomorrow if tolerating POs  LLE vac dressings are CDI without leak    ROS - Patient denies any new issues, except per above.  Pain well controlled.    BP (!) 147/88   Pulse 72   Temp 36.6 °C (97.9 °F) (Temporal)   Resp 16   Ht 1.778 m (5' 10\")   Wt 107 kg (236 lb 15.9 oz)   SpO2 96%     Patient seen and examined  No acute distress  Breathing non labored  RRR  LLE vac dressing in place without leak, DNVI, moves all toes, cap refill <2 sec.     Recent Labs     04/13/24  0522   WBC 6.9   RBC 4.82   HEMOGLOBIN 16.1   HEMATOCRIT 45.6   MCV 94.6   MCH 33.4*   MCHC 35.3   RDW 45.1   PLATELETCT 207   MPV 9.3       Active Hospital Problems    Diagnosis     MRSA infection [A49.02]     Open leg wound, left, subsequent encounter [S81.762D]     Hypertensive urgency [I16.0]        Assessment/Plan:  Patient doing well   IV abx stopped due to itching and headache  ID team recommending po Bactrim- will DC home tomorrow if tolerating POs  LLE vac dressings are CDI without leak  POD#2 S/P:  1.  Excisional debridement of left leg wound measuring 1x0.5 cm including skin, subcutaneous tissue.  2.  Application of skin graft substitute left leg wound.  3.  Secondary closure left leg chronic recurrent open wound.  4.  Application of wound VAC, left leg less than 50 cm2 (non-disposable incisional VAC).  Wt bearing status - WBAT LLE  Wound care/Drains - vac dressings to be changed by wound team  Future Procedures - none planned    Sutures/Staples out- 14-21 days post operatively. Removal will completed by ortho mid levels only.  PT/OT-initiated  Antibiotics: bactrim -160mb po q12  DVT Prophylaxis- TEDS/SCDs/Foot pumps  Rose-not needed per ortho  Case Coordination for Discharge Planning - Disposition per therapy recs.    "

## 2024-04-13 NOTE — CONSULTS
Consults  INFECTIOUS DISEASES INPATIENT CONSULT NOTE     Date of Service: 4/13/2024    Consult Requested By: Raymond Anderson M.D.    Reason for Consultation: Chronic infection of left lower leg    History of Present Illness:   Jarod Fields is a 49 y.o.  admitted 4/11/2024. Pt has a past medical history of chronic left lower extremity wound which and worsened with pain and drainage.  He said multiple prior procedures and infections as described below.  ID has never been consulted before.  He went to the OR on 4/11 with excisional debridement and application of skin graft. OR cultures +MRSA    Review Of Systems:  Review of Systems   Constitutional:  Negative for fever and malaise/fatigue.   HENT:  Negative for hearing loss.    Eyes:  Negative for blurred vision and double vision.   Respiratory:  Negative for cough, sputum production and shortness of breath.    Cardiovascular:  Negative for chest pain and leg swelling.   Gastrointestinal:  Negative for abdominal pain, constipation, diarrhea, nausea and vomiting.   Genitourinary:  Negative for dysuria.   Musculoskeletal:  Positive for myalgias.   Skin:  Negative for rash.   Neurological:  Negative for weakness.   Psychiatric/Behavioral:  The patient is not nervous/anxious.        PMH:   Past Medical History:   Diagnosis Date    Chronic back pain     L2, L3, L4, L5. 12/6/23-recently started with pain management    Chronic infection 07/2023    to left lower leg-different bacteria    CRPS (complex regional pain syndrome type I) 2013    Elevated BP without diagnosis of hypertension     12/6/23-related to pain in LLE.    Kidney stones 1995    and approx 2015    Nerve pain     Left foot/ LLE    Pain 02/18/2015    right wrist, off medications       PSH:  Past Surgical History:   Procedure Laterality Date    PB SECD CLOS SURG WND EXTEN/COMPLIC Left 4/11/2024    Procedure: CLOSURE, WOUND, SECONDARY;  Surgeon: Raymond Anderson M.D.;  Location: SURGERY [facility];   Service: Orthopedics    IRRIGATION & DEBRIDEMENT GENERAL Left 4/11/2024    Procedure: LEFT LEG WOUND DEBRIDEMENT AND SECONDARY CLOSURE;  Surgeon: Raymond Anderson M.D.;  Location: SURGERY [facility];  Service: Orthopedics    IRRIGATION & DEBRIDEMENT GENERAL  12/7/2023    Procedure: LEFT LEG IRRIGATION AND DEBRIDEMENT, APPLICATION OF SKIN SUBSTITUTE;  Surgeon: Gentry Vargas M.D.;  Location: [facility];  Service: Orthopedics    UT DEBRIDE SKIN SUBQ TISSUE FIRST 20 SQ CM Left 09/09/2023    Procedure: LEFT LOWER LEG IRRIGATION AND DEBRIDEMENT, WOUND AND WOUND CLOSURE;  Surgeon: Max Arias M.D.;  Location: SURGERY [facility];  Service: Orthopedics    PB SECD CLOS SURG WND EXTEN/COMPLIC Left 09/09/2023    Procedure: CLOSURE, WOUND, SECONDARY;  Surgeon: Max Arias M.D.;  Location: [facility];  Service: Orthopedics    FASCIOTOMY Left 07/02/2023    Procedure: FASCIOTOMY - LOWER LEG;  Surgeon: Yefri Murguia M.D.;  Location: [facility];  Service: Orthopedics    PB DECOMPBESS ANT/LAT+POST LEG CMPART Left 06/29/2023    Procedure: FASCIAL CLOSURE;  Surgeon: Max Arias M.D.;  Location: [facility];  Service: Orthopedics    INCISION AND DRAINAGE ORTHOPEDIC  06/29/2023    Procedure: INCISION AND DRAINAGE, LOWER LEG;  Surgeon: Max Arias M.D.;  Location: SURGERY [facility];  Service: Orthopedics    SPINAL CORD STIMULATOR  2023    approx Jan or Feb. REMOVED.    SPINAL CORD STIMULATOR  02/19/2015    Performed by Ghassan Coy M.D. at [facility] ORS / REMOVED 6/2023    UT PERCUT IMPLNT NEUROELECT,EPIDURAL  02/04/2015    Performed by Ghassan Coy M.D. at [facility]    UT PERCUT IMPLNT NEUROELECT,EPIDURAL  02/04/2015    Performed by Ghassan Coy M.D. at [facility] ORS    PB INJECT NERV BLCK,STELLATE GANGLION  12/10/2014    Performed by Ghassan Coy M.D. at [facility] ORS    PB INJECT NERV BLCK,STELLATE  GANGLION  2014    Performed by Ghassan Coy M.D. at [facility]    PB INJECT NERV BLCK,STELLATE GANGLION  2014    Performed by Ghassan Coy M.D. at [facility]    GASTROSCOPY WITH BIOPSY  2013    Performed by Robbie Nesbitt M.D. at [facility]OS[facility]S    OTHER ORTHOPEDIC SURGERY  2003    L tib/fib w/hardware-removed       FAMILY HX:  Family History   Problem Relation Age of Onset    Hypertension Other     Cancer Other      Reviewed family history. No pertinent family history.     SOCIAL HX:  Social History     Socioeconomic History    Marital status:      Spouse name: Not on file    Number of children: Not on file    Years of education: Not on file    Highest education level: GED or equivalent   Occupational History    Not on file   Tobacco Use    Smoking status: Former     Current packs/day: 0.00     Average packs/day: 0.5 packs/day for 31.0 years (15.5 ttl pk-yrs)     Types: Cigarettes     Start date:      Quit date: 2020     Years since quittin.2     Passive exposure: Past    Smokeless tobacco: Never    Tobacco comments:     1/2 ppd since 13yo, on chantix   Vaping Use    Vaping Use: Never used   Substance and Sexual Activity    Alcohol use: Not Currently     Comment: EVERY OTHER MONTH    Drug use: Never     Comment: denies    Sexual activity: Yes     Partners: Female   Other Topics Concern    Not on file   Social History Narrative    ** Merged History Encounter **          Social Determinants of Health     Financial Resource Strain: Low Risk  (8/15/2022)    Overall Financial Resource Strain (CARDIA)     Difficulty of Paying Living Expenses: Not very hard   Food Insecurity: No Food Insecurity (8/15/2022)    Hunger Vital Sign     Worried About Running Out of Food in the Last Year: Never true     Ran Out of Food in the Last Year: Never true   Transportation Needs: No Transportation Needs (8/15/2022)    PRAPARE - Transportation     Lack of  Transportation (Medical): No     Lack of Transportation (Non-Medical): No   Physical Activity: Sufficiently Active (8/15/2022)    Exercise Vital Sign     Days of Exercise per Week: 3 days     Minutes of Exercise per Session: 60 min   Stress: No Stress Concern Present (8/15/2022)    [employer] - Occupational Stress Questionnaire     Feeling of Stress : Only a little   Social Connections: Moderately Integrated (8/15/2022)    Social Connection and Isolation Panel [NHANES]     Frequency of Communication with Friends and Family: More than three times a week     Frequency of Social Gatherings with Friends and Family: Twice a week     Attends Judaism Services: More than 4 times per year     Active Member of Clubs or Organizations: No     Attends Club or Organization Meetings: Never     Marital Status:    Intimate Partner Violence: Not on file   Housing Stability: Low Risk  (8/15/2022)    Housing Stability Vital Sign     Unable to Pay for Housing in the Last Year: No     Number of Places Lived in the Last Year: 1     Unstable Housing in the Last Year: No     Social History     Tobacco Use   Smoking Status Former    Current packs/day: 0.00    Average packs/day: 0.5 packs/day for 31.0 years (15.5 ttl pk-yrs)    Types: Cigarettes    Start date:     Quit date: 2020    Years since quittin.2    Passive exposure: Past   Smokeless Tobacco Never   Tobacco Comments    1/2 ppd since 13yo, on chantix     Social History     Substance and Sexual Activity   Alcohol Use Not Currently    Comment: EVERY OTHER MONTH       Allergies/Intolerances:  No Known Allergies    History reviewed with the patient and /or family member, chart & primary care team    Other Current Medications:    Current Facility-Administered Medications:     magnesium oxide tablet 400 mg, 400 mg, Oral, BID, Sebastian Castro D.O., 400 mg at 24 0747    phosphorus (K-Phos-Neutral) per tablet 500 mg, 2 Tablet, Oral, TID,  Sebastian Castro D.O., 500 mg at 04/13/24 0747    hydrALAZINE (Apresoline) injection 20 mg, 20 mg, Intravenous, Q6HRS PRN, Taran Woods M.D., 20 mg at 04/13/24 0747    SUMAtriptan (Imitrex) tablet 100 mg, 100 mg, Oral, Q8HRS PRN, Taran Woods M.D., 100 mg at 04/13/24 0948    Pharmacy Consult Request ...Pain Management Review 1 Each, 1 Each, Other, PHARMACY TO DOSE, Sebastian Castro D.O.    oxyCODONE immediate-release (Roxicodone) tablet 5 mg, 5 mg, Oral, Q3HRS PRN, 5 mg at 04/13/24 0437 **OR** oxyCODONE immediate release (Roxicodone) tablet 10 mg, 10 mg, Oral, Q3HRS PRN, 10 mg at 04/13/24 0830 **OR** HYDROmorphone (Dilaudid) injection 0.5 mg, 0.5 mg, Intravenous, Q3HRS PRN, Sebastian Castro D.O.    Pharmacy Consult Request ...Pain Management Review 1 Each, 1 Each, Other, PHARMACY TO DOSE, Raymond Anderson M.D.    ondansetron (Zofran) syringe/vial injection 4 mg, 4 mg, Intravenous, Q4HRS PRN, Raymond Anderson M.D., 4 mg at 04/13/24 0851    diphenhydrAMINE (Benadryl) injection 25 mg, 25 mg, Intravenous, Q6HRS PRN, Raymond Anderson M.D.    docusate sodium (Colace) capsule 100 mg, 100 mg, Oral, BID, Raymond Anderson M.D., 100 mg at 04/11/24 1736    senna-docusate (Pericolace Or Senokot S) 8.6-50 MG per tablet 1 Tablet, 1 Tablet, Oral, Nightly, Raymond Anderson M.D.    senna-docusate (Pericolace Or Senokot S) 8.6-50 MG per tablet 1 Tablet, 1 Tablet, Oral, Q24HRS PRN, Raymond Anderson M.D.    polyethylene glycol/lytes (Miralax) Packet 1 Packet, 1 Packet, Oral, BID PRN, Raymond Anderson M.D.    magnesium hydroxide (Milk Of Magnesia) suspension 30 mL, 30 mL, Oral, QDAY PRN, Raymond Anderson M.D.    bisacodyl (Dulcolax) suppository 10 mg, 10 mg, Rectal, Q24HRS PRN, Raymond Anderson M.D.    sodium phosphate (Fleet) enema 133 mL, 1 Each, Rectal, Once PRN, Raymond Anderson M.D.    acetaminophen (Tylenol) tablet 1,000 mg, 1,000 mg, Oral, Q6HRS, 1,000 mg at 04/12/24 1139 **FOLLOWED BY** [START ON 4/16/2024]  "acetaminophen (Tylenol) tablet 1,000 mg, 1,000 mg, Oral, Q6HRS PRN, Raymond Anderson M.D.    ketorolac (Toradol) 15 MG/ML injection 15 mg, 15 mg, Intravenous, Q6HRS, 15 mg at 24 1649 **FOLLOWED BY** [START ON 2024] ibuprofen (Motrin) tablet 800 mg, 800 mg, Oral, TID PRN, Raymond Anderson M.D.    MD Alert...Vancomycin per Pharmacy, , Other, PHARMACY TO DOSE, Raymond Anderson M.D.    vancomycin (Vancocin) 1,500 mg in  mL IVPB, 1,500 mg, Intravenous, Q12HR, Raymond Anderson M.D., Stopped at 24 0855    labetalol (Normodyne/Trandate) injection 10 mg, 10 mg, Intravenous, Q20MIN PRN, Raymond Anderson M.D., 10 mg at 24 2308    amLODIPine (Norvasc) tablet 5 mg, 5 mg, Oral, Q DAY, Taran Woods M.D., 5 mg at 24 0437    lisinopril (Prinivil) tablet 40 mg, 40 mg, Oral, Q DAY, Taran Woods M.D., 40 mg at 24 0436  [unfilled]    Most Recent Vital Signs:  BP (!) 143/91 Comment: rn notified  Pulse 66   Temp 36.4 °C (97.5 °F) (Temporal)   Resp 16   Ht 1.778 m (5' 10\")   Wt 107 kg (236 lb 15.9 oz)   SpO2 94%   BMI 34.01 kg/m²   Temp  Av.3 °C (97.4 °F)  Min: 36.1 °C (97 °F)  Max: 36.9 °C (98.4 °F)    Physical Exam:  Physical Exam  Constitutional:       Appearance: Normal appearance.   HENT:      Head: Normocephalic and atraumatic.      Right Ear: External ear normal.      Left Ear: External ear normal.      Nose: Nose normal.      Mouth/Throat:      Mouth: Mucous membranes are moist.      Pharynx: Oropharynx is clear.   Eyes:      Extraocular Movements: Extraocular movements intact.      Conjunctiva/sclera: Conjunctivae normal.      Pupils: Pupils are equal, round, and reactive to light.   Cardiovascular:      Rate and Rhythm: Normal rate and regular rhythm.      Pulses: Normal pulses.   Pulmonary:      Effort: Pulmonary effort is normal.      Breath sounds: Normal breath sounds.   Abdominal:      General: Abdomen is flat. Bowel sounds are normal.      Palpations: " Abdomen is soft.   Musculoskeletal:      Cervical back: Normal range of motion and neck supple.      Comments: Bandaging with wound VAC in place   Skin:     General: Skin is warm and dry.   Neurological:      General: No focal deficit present.      Mental Status: He is alert and oriented to person, place, and time.   Psychiatric:         Mood and Affect: Mood normal.         Behavior: Behavior normal.           Pertinent Lab Results:  Recent Labs     04/13/24  0522   WBC 6.9      Recent Labs     04/13/24  0522   HEMOGLOBIN 16.1   HEMATOCRIT 45.6   MCV 94.6   MCH 33.4*   PLATELETCT 207         Recent Labs     04/11/24  1816 04/13/24  0522   SODIUM 137 139   POTASSIUM 4.1 3.5*   CHLORIDE 102 104   CO2 22 21   CREATININE 0.72 0.60        Recent Labs     04/13/24  0522   ALBUMIN 4.1        Pertinent Micro:  Results       Procedure Component Value Units Date/Time    CULTURE WOUND W/ GRAM STAIN [[id number]]  (Abnormal)  (Susceptibility) Collected: 04/11/24 1424    Order Status: Completed Specimen: Wound Updated: 04/13/24 1108     Significant Indicator POS     Source WND     Site L leg wound     Culture Result -     Gram Stain Result Many WBCs.  Rare Gram positive cocci.       Culture Result Methicillin Resistant Staphylococcus aureus  Light growth  Methicillin resistant by screening method  This isolate is presumed to be clindamycin resistant based on  detection of inducible resistance.      Narrative:      Surgery - swabs received    Susceptibility       Methicillin resistant staphylococcus aureus (1)       Antibiotic Interpretation Microscan   Method Status    Clindamycin Resistant 0.5 mcg/mL LEDA Preliminary    Cefazolin Resistant <=8 mcg/mL LEDA Preliminary    Cefepime Resistant 8 mcg/mL LEDA Preliminary    Daptomycin Sensitive <=0.5 mcg/mL LEDA Preliminary    Ampicillin/sulbactam Resistant <=8/4 mcg/mL LEDA Preliminary    Erythromycin Resistant >4 mcg/mL LEDA Preliminary    Vancomycin Sensitive 1 mcg/mL LEDA Preliminary     "Linezolid Sensitive 4 mcg/mL LEDA Preliminary    Oxacillin Resistant >2 mcg/mL LEDA Preliminary    Trimeth/Sulfa Sensitive <=0.5/9.5 mcg/mL LEDA Preliminary    Tetracycline Resistant >8 mcg/mL LEDA Preliminary                       Anaerobic Culture [[id number]] Collected: 04/11/24 1424    Order Status: Completed Specimen: Wound Updated: 04/13/24 1108     Significant Indicator NEG     Source WND     Site L leg wound     Culture Result Culture in progress.    Narrative:      Surgery - swabs received    Fungal Culture [[id number]] Collected: 04/11/24 1424    Order Status: Completed Specimen: Wound Updated: 04/13/24 1108     Significant Indicator NEG     Source WND     Site L leg wound     Culture Result Culture in progress.     Fungal Smear Results No fungal elements seen.    Narrative:      Surgery - swabs received    Fungal Smear [[id number]] Collected: 04/11/24 1424    Order Status: Completed Specimen: Wound Updated: 04/12/24 0316     Significant Indicator NEG     Source WND     Site L leg wound     Fungal Smear Results No fungal elements seen.    Narrative:      Surgery - swabs received    GRAM STAIN [[id number]] Collected: 04/11/24 1424    Order Status: Completed Specimen: Wound Updated: 04/12/24 0316     Significant Indicator .     Source WND     Site L leg wound     Gram Stain Result Many WBCs.  Rare Gram positive cocci.      Narrative:      Surgery - swabs received    MRSA By PCR (Amp) [[id number]] Collected: 04/11/24 2200    Order Status: Completed Specimen: Respirate from Nares Updated: 04/12/24 0003     MRSA by PCR Negative          No results found for: \"BLOODCULTU\", \"BLDCULT\", \"BCHOLD\"     Studies:  CT-HEAD W/O    Result Date: 4/12/2024 4/12/2024 12:27 AM HISTORY/REASON FOR EXAM:  headache + high bp. TECHNIQUE/EXAM DESCRIPTION AND NUMBER OF VIEWS: CT of the head without contrast. The study was performed on a helical multidetector CT scanner. Contiguous axial sections were obtained from the skull base through the " vertex. Up to date radiation dose reduction adjustments have been utilized to meet ALARA standards for radiation dose reduction. COMPARISON:  4/17/2018 FINDINGS: There is no fracture or abnormal extra-axial fluid collection. There is no evidence for intracranial hemorrhage or acute infarction. Ventricles are stable in size and the basilar cisterns are patent. There is no mass effect or midline shift. Paranasal sinuses in the field of view are unremarkable. Mastoids in the field of view are unremarkable.     There is no definite acute intracranial abnormality.       ASSESSMENT/PLAN:     49 y.o.  admitted 4/11/2024. Pt has a past medical history of chronic left lower extremity wound which and worsened with pain and drainage.  He said multiple prior procedures and infections as described below.  ID has never been consulted before.  He went to the OR on 4/11 with excisional debridement and application of skin graft. OR cultures +MRSA    Problem List    Superinfected chronic left leg wound, s/p multiple prior surgical procedures-Status post I&D on 4/11 and application of wound VAC,  per op note exposed tendon which appeared viable and flap coverage.   -OR cultures from 4/11 positive for MRSA, sensitive to linezolid and Bactrim  History of compartment syndrome treated with fasciotomies and chronic wounds  History of MSSA infection of the left leg in 9/23  Polymicrobial infection of left tibia in 11/23 cultures at the time grew Peptoniphilus, Enterobacter cloacae, corynebacterium and group A strep  -He has been treated with a variety of different antibiotics including Keflex, Bactrim and most recently a very prolonged course of linezolid which likely resulted in bone marrow suppression with leukopenia, improved on labs from 4/13.     Assessment:      -Notes were reviewed from primary team, radiology, ED, surgery, specialists, etc.   -Reviewed labs to date, microbiology for current admit and prior  -Imaging was  independently reviewed and interpreted      Plan:    Recommendations for antibiotics:   --- Stop vancomycin will transition to Bactrim 2 double strength twice daily and will recommend a 2-6 week antibiotic course depending on wound healing  --- On discharge give the patient 2 weeks of Bactrim and will follow labs and then continue antibiotics as needed after follow-up in ID clinic    Recommendations for further/ongoing work-up, monitoring of clinical status and drug toxicity:   --- Continue to monitor labs, labs as an outpatient in 1 week at [facility] facility.  Patient states he will be compliant  --- Wound VAC management per surgery, plan is to leave wound VAC in place  --- May need follow-up in wound care clinic  --- Please place referral for outpatient ID clinic    Follow-up in ID clinic in 2 weeks, okay to be seen by DOMONIQUE Walter.    Dispo: Awaiting culture results and work-up as above.  Patient is at risk for infectious complications including death, sepsis and loss of limb function.  ID will sign off.     Plan of care discussed with IM Raymond Anderson M.D.. Will continue to follow    Silvia Velazquez M.D.    ADDENDA     Light growth of anaerobic Finegoldia magna, this may be skin contaminant/colonizer but given the patient has had longstanding infection with difficulty healing we will treat.  Added Flagyl 500 mg twice daily to Bactrim as above.  If patient does not tolerate the Flagyl okay to stop and treat the Staph aureus alone with close follow-up.

## 2024-04-13 NOTE — CARE PLAN
The patient is Stable - Low risk of patient condition declining or worsening    Shift Goals  Clinical Goals: pain control, IV ABX, decreased BP  Patient Goals: pain control, no headaches  Family Goals: pt comfort    Progress made toward(s) clinical / shift goals:    Problem: Pain - Standard  Goal: Alleviation of pain or a reduction in pain to the patient’s comfort goal  Outcome: Progressing     Problem: Knowledge Deficit - Standard  Goal: Patient and family/care givers will demonstrate understanding of plan of care, disease process/condition, diagnostic tests and medications  Outcome: Progressing     Problem: Wound/ / Incision Healing  Goal: Patient's wound/surgical incision will decrease in size and heals properly  Outcome: Progressing       Patient is not progressing towards the following goals:

## 2024-04-13 NOTE — PROGRESS NOTES
Hospital Medicine Daily Progress Note-consultation    Date of Service  4/13/2024    Chief Complaint  Jarod Fields is a 49 y.o. male admitted 4/11/2024 with No chief complaint on file.        Hospital Course  No notes on file    Interval Problem Update  Patient was seen and examined at bedside.  I have personally reviewed and interpreted vitals, labs, and imaging.    4/12.  Afebrile.  Hypertension is improved.  On 0-2 L nasal cannula.  Denies fevers, chills, chest pains, shortness of breath.  Blood pressure is better controlled and resumed home meds.  Headache currently has resolved.  OR. Wound culture grew MRSA.  Continue vancomycin.  Monitor closely for toxicity.  Consulted ID.  4/13.  Afebrile.  Hypertension improved.  On room air.  Afebrile.  Stable vitals.  On room air.  Patient had an apparent bad reaction to vancomycin.  Again became nauseous with a bad headache.  Did not have a increase in blood pressure.  Infusion was discontinued.  Discussed with infectious disease.  Started on Bactrim for MRSA wound infection.    I have discussed this patient's plan of care and discharge plan at IDT rounds today with Case Management, Nursing, Nursing leadership, and other members of the IDT team.    Consultants/Specialty  Ortho is primary    Code Status  Full Code    Disposition  The patient is not medically cleared for discharge to home or a post-acute facility.  Anticipate discharge to: skilled nursing facility    I have placed the appropriate orders for post-discharge needs.    Review of Systems  Review of Systems   Gastrointestinal:  Positive for constipation.   Musculoskeletal:  Positive for myalgias.   Neurological:  Positive for headaches.        Physical Exam  Temp:  [36.2 °C (97.2 °F)-36.6 °C (97.9 °F)] 36.2 °C (97.2 °F)  Pulse:  [66-97] 72  Resp:  [16] 16  BP: (104-172)/(58-93) 144/86  SpO2:  [92 %-97 %] 94 %    Physical Exam  Vitals and nursing note reviewed.   Constitutional:       Appearance: Normal  appearance. He is obese. He is ill-appearing.   HENT:      Head: Normocephalic and atraumatic.      Nose: Nose normal.      Mouth/Throat:      Mouth: Mucous membranes are moist.      Pharynx: Oropharynx is clear.   Eyes:      Extraocular Movements: Extraocular movements intact.      Conjunctiva/sclera: Conjunctivae normal.   Cardiovascular:      Rate and Rhythm: Normal rate and regular rhythm.      Pulses: Normal pulses.      Heart sounds: Normal heart sounds. No murmur heard.     No friction rub. No gallop.   Pulmonary:      Effort: Pulmonary effort is normal. No respiratory distress.      Breath sounds: Normal breath sounds. No wheezing or rales.   Chest:      Chest wall: No tenderness.   Abdominal:      General: Abdomen is flat. Bowel sounds are normal. There is no distension.      Palpations: Abdomen is soft. There is no mass.      Tenderness: There is no abdominal tenderness. There is no guarding.   Musculoskeletal:         General: Normal range of motion.      Cervical back: Normal range of motion and neck supple.      Comments: Right lower extremity wound with wound VAC   Skin:     General: Skin is warm.      Capillary Refill: Capillary refill takes less than 2 seconds.   Neurological:      General: No focal deficit present.      Mental Status: He is alert and oriented to person, place, and time. Mental status is at baseline.      Cranial Nerves: No cranial nerve deficit.      Motor: No weakness.   Psychiatric:         Mood and Affect: Mood normal.         Behavior: Behavior normal.         Thought Content: Thought content normal.         Judgment: Judgment normal.         Fluids  No intake or output data in the 24 hours ending 04/13/24 0627      Laboratory  Recent Labs     04/13/24  0522   WBC 6.9   RBC 4.82   HEMOGLOBIN 16.1   HEMATOCRIT 45.6   MCV 94.6   MCH 33.4*   MCHC 35.3   RDW 45.1   PLATELETCT 207   MPV 9.3     Recent Labs     04/11/24  1816   SODIUM 137   POTASSIUM 4.1   CHLORIDE 102   CO2 22    GLUCOSE 130*   BUN 12   CREATININE 0.72   CALCIUM 8.7                   Imaging  CT-HEAD W/O   Final Result      There is no definite acute intracranial abnormality.              Assessment/Plan  * Hypertensive urgency  Assessment & Plan  4/13/2024  Patient with history of hypertension presents for headache and uncontrolled blood pressure shortly after excisional debridement of his left leg wound, has somewhat responded to 30 mg labetalol. /105 upon my assessment with patient.  Stat CT head to rule out ICH, stat EKG  Resume home medications (Norvasc 5 mg po daily and lisinopril 40 mg po daily)  Start IV hydralazine prn  Should blood pressure continue to remain refractory, may need Cardene drip    MRSA infection  Assessment & Plan  4/13/2024  OR wound culture growing MRSA  ID consulted and following  Continue vancomycin.  Monitor closely for toxicity.  Switch to Bactrim due to likely reaction to vancomycin    Open leg wound, left, subsequent encounter- (present on admission)  Assessment & Plan  4/13/2024  Status post excisional debridement and application of skin graft and wound VAC, postop day 0  Orthopedic surgery follow-up         VTE prophylaxis: Had recent surgery    I have performed a physical exam and reviewed and updated ROS and Plan today (4/13/2024). In review of yesterday's note (4/12/2024), there are no changes except as documented above.    Greater than 51 minutes spent prepping to see patient (e.g. review of tests) obtaining and/or reviewing separately obtained history. Performing a medically appropriate examination and/ evaluation.  Counseling and educating the patient/family/caregiver.  Ordering medications, tests, or procedures.  Referring and communicating with other health care professionals.  Documenting clinical information in EPIC.  Independently interpreting results and communicating results to patient/family/caregiver.  Care coordination.

## 2024-04-14 ENCOUNTER — PHARMACY VISIT (OUTPATIENT)
Dept: PHARMACY | Facility: MEDICAL CENTER | Age: 49
End: 2024-04-14
Payer: MEDICARE

## 2024-04-14 VITALS
BODY MASS INDEX: 33.93 KG/M2 | RESPIRATION RATE: 16 BRPM | DIASTOLIC BLOOD PRESSURE: 101 MMHG | HEART RATE: 88 BPM | WEIGHT: 236.99 LBS | OXYGEN SATURATION: 96 % | HEIGHT: 70 IN | SYSTOLIC BLOOD PRESSURE: 151 MMHG | TEMPERATURE: 97.2 F

## 2024-04-14 LAB
ANION GAP SERPL CALC-SCNC: 11 MMOL/L (ref 7–16)
BACTERIA SPEC ANAEROBE CULT: ABNORMAL
BACTERIA SPEC ANAEROBE CULT: ABNORMAL
BACTERIA WND AEROBE CULT: ABNORMAL
BUN SERPL-MCNC: 15 MG/DL (ref 8–22)
CALCIUM SERPL-MCNC: 9.1 MG/DL (ref 8.5–10.5)
CHLORIDE SERPL-SCNC: 102 MMOL/L (ref 96–112)
CO2 SERPL-SCNC: 25 MMOL/L (ref 20–33)
CREAT SERPL-MCNC: 0.76 MG/DL (ref 0.5–1.4)
ERYTHROCYTE [DISTWIDTH] IN BLOOD BY AUTOMATED COUNT: 46.3 FL (ref 35.9–50)
GFR SERPLBLD CREATININE-BSD FMLA CKD-EPI: 111 ML/MIN/1.73 M 2
GLUCOSE SERPL-MCNC: 90 MG/DL (ref 65–99)
GRAM STN SPEC: ABNORMAL
HCT VFR BLD AUTO: 51.7 % (ref 42–52)
HGB BLD-MCNC: 18 G/DL (ref 14–18)
MAGNESIUM SERPL-MCNC: 2.2 MG/DL (ref 1.5–2.5)
MCH RBC QN AUTO: 33.6 PG (ref 27–33)
MCHC RBC AUTO-ENTMCNC: 34.8 G/DL (ref 32.3–36.5)
MCV RBC AUTO: 96.5 FL (ref 81.4–97.8)
PHOSPHATE SERPL-MCNC: 4 MG/DL (ref 2.5–4.5)
PLATELET # BLD AUTO: 225 K/UL (ref 164–446)
PMV BLD AUTO: 9.1 FL (ref 9–12.9)
POTASSIUM SERPL-SCNC: 3.8 MMOL/L (ref 3.6–5.5)
RBC # BLD AUTO: 5.36 M/UL (ref 4.7–6.1)
SIGNIFICANT IND 70042: ABNORMAL
SIGNIFICANT IND 70042: ABNORMAL
SITE SITE: ABNORMAL
SITE SITE: ABNORMAL
SODIUM SERPL-SCNC: 138 MMOL/L (ref 135–145)
SOURCE SOURCE: ABNORMAL
SOURCE SOURCE: ABNORMAL
WBC # BLD AUTO: 7.9 K/UL (ref 4.8–10.8)

## 2024-04-14 PROCEDURE — A9270 NON-COVERED ITEM OR SERVICE: HCPCS | Performed by: ORTHOPAEDIC SURGERY

## 2024-04-14 PROCEDURE — A9270 NON-COVERED ITEM OR SERVICE: HCPCS | Performed by: STUDENT IN AN ORGANIZED HEALTH CARE EDUCATION/TRAINING PROGRAM

## 2024-04-14 PROCEDURE — 84100 ASSAY OF PHOSPHORUS: CPT

## 2024-04-14 PROCEDURE — 96376 TX/PRO/DX INJ SAME DRUG ADON: CPT

## 2024-04-14 PROCEDURE — 85027 COMPLETE CBC AUTOMATED: CPT

## 2024-04-14 PROCEDURE — 700102 HCHG RX REV CODE 250 W/ 637 OVERRIDE(OP): Performed by: INTERNAL MEDICINE

## 2024-04-14 PROCEDURE — 80048 BASIC METABOLIC PNL TOTAL CA: CPT

## 2024-04-14 PROCEDURE — RXMED WILLOW AMBULATORY MEDICATION CHARGE: Performed by: ORTHOPAEDIC SURGERY

## 2024-04-14 PROCEDURE — A9270 NON-COVERED ITEM OR SERVICE: HCPCS | Performed by: INTERNAL MEDICINE

## 2024-04-14 PROCEDURE — 36415 COLL VENOUS BLD VENIPUNCTURE: CPT

## 2024-04-14 PROCEDURE — RXMED WILLOW AMBULATORY MEDICATION CHARGE: Performed by: PHYSICIAN ASSISTANT

## 2024-04-14 PROCEDURE — 83735 ASSAY OF MAGNESIUM: CPT

## 2024-04-14 PROCEDURE — 700102 HCHG RX REV CODE 250 W/ 637 OVERRIDE(OP): Performed by: STUDENT IN AN ORGANIZED HEALTH CARE EDUCATION/TRAINING PROGRAM

## 2024-04-14 PROCEDURE — G0378 HOSPITAL OBSERVATION PER HR: HCPCS

## 2024-04-14 PROCEDURE — 99233 SBSQ HOSP IP/OBS HIGH 50: CPT | Performed by: INTERNAL MEDICINE

## 2024-04-14 PROCEDURE — 700111 HCHG RX REV CODE 636 W/ 250 OVERRIDE (IP): Mod: JZ | Performed by: ORTHOPAEDIC SURGERY

## 2024-04-14 PROCEDURE — 700102 HCHG RX REV CODE 250 W/ 637 OVERRIDE(OP): Performed by: ORTHOPAEDIC SURGERY

## 2024-04-14 RX ORDER — HYDROCODONE BITARTRATE AND ACETAMINOPHEN 5; 325 MG/1; MG/1
1 TABLET ORAL EVERY 4 HOURS PRN
Qty: 30 TABLET | Refills: 0 | Status: SHIPPED | OUTPATIENT
Start: 2024-04-14 | End: 2024-04-28

## 2024-04-14 RX ORDER — METRONIDAZOLE 500 MG/1
500 TABLET ORAL EVERY 12 HOURS
Qty: 28 TABLET | Refills: 0 | Status: ACTIVE | OUTPATIENT
Start: 2024-04-14 | End: 2024-04-28

## 2024-04-14 RX ORDER — SULFAMETHOXAZOLE AND TRIMETHOPRIM 800; 160 MG/1; MG/1
2 TABLET ORAL EVERY 12 HOURS
Qty: 56 TABLET | Refills: 0 | Status: ACTIVE | OUTPATIENT
Start: 2024-04-14 | End: 2024-04-28

## 2024-04-14 RX ORDER — METRONIDAZOLE 500 MG/1
500 TABLET ORAL EVERY 12 HOURS
Status: DISCONTINUED | OUTPATIENT
Start: 2024-04-14 | End: 2024-04-14 | Stop reason: HOSPADM

## 2024-04-14 RX ADMIN — OXYCODONE 5 MG: 5 TABLET ORAL at 01:01

## 2024-04-14 RX ADMIN — LISINOPRIL 40 MG: 20 TABLET ORAL at 04:22

## 2024-04-14 RX ADMIN — ACETAMINOPHEN 1000 MG: 500 TABLET, FILM COATED ORAL at 04:19

## 2024-04-14 RX ADMIN — AMLODIPINE BESYLATE 5 MG: 5 TABLET ORAL at 04:22

## 2024-04-14 RX ADMIN — SUMATRIPTAN SUCCINATE 100 MG: 50 TABLET ORAL at 10:20

## 2024-04-14 RX ADMIN — METRONIDAZOLE 500 MG: 500 TABLET ORAL at 10:20

## 2024-04-14 RX ADMIN — KETOROLAC TROMETHAMINE 15 MG: 15 INJECTION, SOLUTION INTRAMUSCULAR; INTRAVENOUS at 04:20

## 2024-04-14 RX ADMIN — SULFAMETHOXAZOLE AND TRIMETHOPRIM 2 TABLET: 800; 160 TABLET ORAL at 04:22

## 2024-04-14 RX ADMIN — ONDANSETRON 4 MG: 2 INJECTION INTRAMUSCULAR; INTRAVENOUS at 04:24

## 2024-04-14 ASSESSMENT — ENCOUNTER SYMPTOMS
CONSTIPATION: 1
HEADACHES: 1
MYALGIAS: 1

## 2024-04-14 ASSESSMENT — PAIN DESCRIPTION - PAIN TYPE: TYPE: ACUTE PAIN

## 2024-04-14 NOTE — CARE PLAN
The patient is Stable - Low risk of patient condition declining or worsening    Shift Goals  Clinical Goals: pain control  Patient Goals: pain control  Family Goals: NA    Progress made toward(s) clinical / shift goals:  yes  Problem: Pain - Standard  Goal: Alleviation of pain or a reduction in pain to the patient’s comfort goal  Outcome: Progressing     Problem: Wound/ / Incision Healing  Goal: Patient's wound/surgical incision will decrease in size and heals properly  Outcome: Progressing       Patient is not progressing towards the following goals:

## 2024-04-14 NOTE — DISCHARGE SUMMARY
DISCHARGE SUMMARY    PATIENTS NAME: Jarod Fields    MRN: [id number]    CSN: [id number]    ADMIT DATE:  4/11/2024    DISCHARGE DATE: 4/14/2024    ADMIT MD: Raymond Anderson M.D.    DISCHARGE MD: Raymond Anderson M.D.    REASON FOR ADMIT:chronic left leg wound    PRINCIPLE DIAGNOSIS:Chronic recurrent left leg open wound.     SECONDARY DIAGNOSIS:none    PROCEDURES: 4/11/24  Raymond Anderson M.D.  1.  Excisional debridement of left leg wound measuring 1x0.5 cm including skin, subcutaneous tissue.  2.  Application of skin graft substitute left leg wound.  3.  Secondary closure left leg chronic recurrent open wound.  4.  Application of wound VAC, left leg less than 50 cm2 (non-disposable incisional VAC).    CONSULTATIONS: Raymond Anderson M.D.    Patient Active Problem List    Diagnosis Date Noted    MRSA infection 04/12/2024    Open leg wound, left, subsequent encounter 04/11/2024    Hypertensive urgency 04/11/2024    Wound dehiscence 09/08/2023    Vitamin D deficiency 12/20/2022    Pain of left upper extremity 12/14/2022    Shortness of breath 12/14/2022    Pain of left lower extremity 12/14/2022    Elevated LDL cholesterol level 12/14/2022    Intractable low back pain 08/16/2022    Intractable back pain with lumbosacral radiculopathy 08/15/2022    Elevated blood pressure reading 08/15/2022    Dysuria 05/15/2021    Reflex sympathetic dystrophy of lower limb 02/19/2015    Reflex sympathetic dystrophy of upper extremity 12/03/2014    Back pain 11/26/2014    Hyponatremia 06/22/2013    Hypokalemia 06/22/2013    Abdominal pain, other specified site 06/21/2013    Transaminitis 06/21/2013       HOSPITAL COURSE: Patient is a 49 year old male who has a long history of a previous compartment syndrome, treated with fasciotomies. He went on to develop infection in his surgical site that required multiple wash outs.  He presented to clinic again with draining wound. Dr Raymond Anderson MD was consulted for orthopaedics.  He felt  that the nature of the patients chronic wound necessitated surgical intervention.  After explaining the indications, risks, benefits, and alternatives the patient wished to proceed with surgical intervention.  The patient was taken to the OR for the above mentioned procedure.  He had no complications and minimal blood loss. He has done well with mobilization and his pain has been well controlled with oral medications. He is now ready for DC at this time.     DISCHARGE LOCATION:home    DVT PROPHYLAXSIS:ambulatory    ANTIBIOTICS:bactrim DS po BID x 6 weeks managed by ID team    WEIGHT BEARING:weight bearing as tolerated left lower extremity    FOLLOW UP: 10-14 days post operatively with Dr Raymond Anderson M.D. and infectious disease team    DISCHARGE DIAGNOSIS:home    MEDICATIONS:   Current Outpatient Medications   Medication Sig Dispense Refill    sulfamethoxazole-trimethoprim (BACTRIM DS) 800-160 MG tablet Take 2 Tablets by mouth every 12 hours for 14 days. 56 Tablet 0    HYDROcodone-acetaminophen (NORCO) 5-325 MG Tab per tablet Take 1 Tablet by mouth every four hours as needed (severe pain) for up to 14 days. 30 Tablet 0    metroNIDAZOLE (FLAGYL) 500 MG Tab Take 1 Tablet by mouth every 12 hours for 14 days. 28 Tablet 0

## 2024-04-14 NOTE — PROGRESS NOTES
Patient A&OX4. Patient on MRSA isolation.  Meds administered per MAR.  Wound vac in place.  Water and call light within reach.

## 2024-04-14 NOTE — DISCHARGE PLANNING
Case Management Discharge Planning    Admission Date: 4/11/2024  GMLOS:    ALOS: 0    6-Clicks ADL Score:    6-Clicks Mobility Score:        Anticipated Discharge Dispo: Discharge Disposition: Discharged to home/self care (01)    DME Needed: Yes    DME Ordered: Yes    Action(s) Taken: Plan for dc today, requested PA place [facility] OP referral in prior to dc, escalated to CM leadership as OP Wound Care is not open on weekends and if pt can dc home without OP Wound Care apts arranged. Pt does not need continuous OP Wound Clinic appointments, needs a one time apt on 4/18 per PA. Contacted [facility] and left voicemail, pt to dc home today with his wound vac and this LMSW to follow tomorrow with [facility].     Escalations Completed: None    Medically Clear: Yes    Next Steps: Pending [facility] apts.    Barriers to Discharge: Outpatient referrals pending    Is the patient up for discharge tomorrow: No    Care Transition Team Assessment    Information Source  Orientation Level: Oriented X4  Information Given By: Patient  Who is responsible for making decisions for patient? : Patient    Readmission Evaluation  Is this a readmission?: No    Elopement Risk  Legal Hold: No  Ambulatory or Self Mobile in Wheelchair: Yes  Disoriented: No  Psychiatric Symptoms: None  History of Wandering: No  Elopement this Admit: No  Vocalizing Wanting to Leave: No  Displays Behaviors, Body Language Wanting to Leave: No-Not at Risk for Elopement  Elopement Risk: Not at Risk for Elopement    Interdisciplinary Discharge Planning  Lives with - Patient's Self Care Capacity: Spouse, Child Less than 18 Years of Age  Patient or legal guardian wants to designate a caregiver: No  Support Systems: Family Member(s), Spouse / Significant Other  Housing / Facility: [address]  Durable Medical Equipment: Other - Specify (wound care)    Discharge Preparedness  What is your plan after discharge?: Home with help  What are  your discharge supports?: Spouse  Prior Functional Level: Ambulatory  Difficulity with ADLs: None  Difficulity with IADLs: None    Functional Assesment  Prior Functional Level: Ambulatory    Finances  Financial Barriers to Discharge: No  Prescription Coverage: Yes              Advance Directive  Advance Directive?: None    Domestic Abuse  Have you ever been the victim of abuse or violence?: No  Physical Abuse or Sexual Abuse: No  Verbal Abuse or Emotional Abuse: No  Possible Abuse/Neglect Reported to:: Not Applicable    Psychological Assessment  History of Substance Abuse: None  History of Psychiatric Problems: No  Non-compliant with Treatment: No  Newly Diagnosed Illness: No    Discharge Risks or Barriers  Discharge risks or barriers?: Complex medical needs  Patient risk factors: Complex medical needs    Anticipated Discharge Information  Discharge Disposition: Discharged to home/self care (01)

## 2024-04-14 NOTE — PROGRESS NOTES
"[facility] Medicine Daily Progress Note-consultation    Date of Service  4/14/2024    Chief Complaint  Jarod Fields is a 49 y.o. male admitted 4/11/2024 with No chief complaint on file.        Hospital Course  No notes on file  49 y.o. male who presented 4/11/2024 with a headache s/p excisional debridement of left leg wound.     Patient has history of chronic recurrent open wound of his left lower extremity.  He was taken to the OR today by orthopedic surgery and had excisional debridement of left leg wound measuring 1 x 0.5 cm, including skin and subcutaneous tissue.  Application of skin graft and left leg wound and wound VAC was applied.  Postoperatively, patient began to complain of headaches.  He has history of migraines but states that this felt different.  He states that it felt like \" his head was going to explode.\"  he was found to have heart rate 105, and systolic blood pressure into 210s.  Patient had been given 3 10 mg doses of labetalol within the course of an hour.  Blood pressure continued to remain high, thus medicine was consulted for management of hypertensive urgency.  Interval Problem Update  Patient was seen and examined at bedside.  I have personally reviewed and interpreted vitals, labs, and imaging.    4/12.  Afebrile.  Hypertension is improved.  On 0-2 L nasal cannula.  Denies fevers, chills, chest pains, shortness of breath.  Blood pressure is better controlled and resumed home meds.  Headache currently has resolved.  OR. Wound culture grew MRSA.  Continue vancomycin.  Monitor closely for toxicity.  Consulted ID.  4/13.  Afebrile.  Hypertension improved.  On room air.  Afebrile.  Stable vitals.  On room air.  Patient had an apparent bad reaction to vancomycin.  Again became nauseous with a bad headache.  Did not have a increase in blood pressure.  Infusion was discontinued.  Discussed with infectious disease.  Started on Bactrim for MRSA wound infection.  4/14.  Afebrile.  Mild " hypertension.  On room air.  Denies fever, chills, chest pains, shortness of breath.  Headache is controlled.  Was switched to Bactrim for MRSA infection.  Discussed with Ortho.  Medically stable to discharge home per Ortho    I have discussed this patient's plan of care and discharge plan at IDT rounds today with Case Management, Nursing, Nursing leadership, and other members of the IDT team.    Consultants/Specialty  Ortho is primary    Code Status  Full Code    Disposition  The patient is not medically cleared for discharge to home or a post-acute facility.  Anticipate discharge to: home with close outpatient follow-up    I have placed the appropriate orders for post-discharge needs.    Review of Systems  Review of Systems   Gastrointestinal:  Positive for constipation.   Musculoskeletal:  Positive for myalgias.   Neurological:  Positive for headaches.        Physical Exam  Temp:  [36.3 °C (97.3 °F)-36.6 °C (97.9 °F)] 36.4 °C (97.5 °F)  Pulse:  [66-89] 89  Resp:  [16] 16  BP: (143-158)/(88-98) 146/96  SpO2:  [93 %-96 %] 94 %    Physical Exam  Vitals and nursing note reviewed.   Constitutional:       Appearance: Normal appearance. He is obese. He is ill-appearing.   HENT:      Head: Normocephalic and atraumatic.      Nose: Nose normal.      Mouth/Throat:      Mouth: Mucous membranes are moist.      Pharynx: Oropharynx is clear.   Eyes:      Extraocular Movements: Extraocular movements intact.      Conjunctiva/sclera: Conjunctivae normal.   Cardiovascular:      Rate and Rhythm: Normal rate and regular rhythm.      Pulses: Normal pulses.      Heart sounds: Normal heart sounds. No murmur heard.     No friction rub. No gallop.   Pulmonary:      Effort: Pulmonary effort is normal. No respiratory distress.      Breath sounds: Normal breath sounds. No wheezing or rales.   Chest:      Chest wall: No tenderness.   Abdominal:      General: Abdomen is flat. Bowel sounds are normal. There is no distension.      Palpations:  Abdomen is soft. There is no mass.      Tenderness: There is no abdominal tenderness. There is no guarding.   Musculoskeletal:         General: Normal range of motion.      Cervical back: Normal range of motion and neck supple.      Comments: Right lower extremity wound with wound VAC   Skin:     General: Skin is warm.      Capillary Refill: Capillary refill takes less than 2 seconds.   Neurological:      General: No focal deficit present.      Mental Status: He is alert and oriented to person, place, and time. Mental status is at baseline.      Cranial Nerves: No cranial nerve deficit.      Motor: No weakness.   Psychiatric:         Mood and Affect: Mood normal.         Behavior: Behavior normal.         Thought Content: Thought content normal.         Judgment: Judgment normal.         Fluids    Intake/Output Summary (Last 24 hours) at 4/14/2024 0632  Last data filed at 4/13/2024 0930  Gross per 24 hour   Intake 120 ml   Output 350 ml   Net -230 ml         Laboratory  Recent Labs     04/13/24  0522   WBC 6.9   RBC 4.82   HEMOGLOBIN 16.1   HEMATOCRIT 45.6   MCV 94.6   MCH 33.4*   MCHC 35.3   RDW 45.1   PLATELETCT 207   MPV 9.3     Recent Labs     04/11/24  1816 04/13/24  0522   SODIUM 137 139   POTASSIUM 4.1 3.5*   CHLORIDE 102 104   CO2 22 21   GLUCOSE 130* 114*   BUN 12 12   CREATININE 0.72 0.60   CALCIUM 8.7 8.4*                   Imaging  CT-HEAD W/O   Final Result      There is no definite acute intracranial abnormality.              Assessment/Plan  * Hypertensive urgency  Assessment & Plan  4/14/2024  Patient with history of hypertension presents for headache and uncontrolled blood pressure shortly after excisional debridement of his left leg wound, has somewhat responded to 30 mg labetalol. /105 upon my assessment with patient.  Stat CT head to rule out ICH, stat EKG  Resume home medications (Norvasc 5 mg po daily and lisinopril 40 mg po daily)  Start IV hydralazine prn  Should blood pressure continue  to remain refractory, may need Cardene drip    MRSA infection  Assessment & Plan  4/14/2024  OR wound culture growing MRSA  ID consulted and following  Continue vancomycin.  Monitor closely for toxicity.  Switch to Bactrim due to likely reaction to vancomycin    Open leg wound, left, subsequent encounter- (present on admission)  Assessment & Plan  4/14/2024  Status post excisional debridement and application of skin graft and wound VAC, postop day 0  Orthopedic surgery follow-up         VTE prophylaxis: Had recent surgery    I have performed a physical exam and reviewed and updated ROS and Plan today (4/14/2024). In review of yesterday's note (4/13/2024), there are no changes except as documented above.    Greater than 50 minutes spent prepping to see patient (e.g. review of tests) obtaining and/or reviewing separately obtained history. Performing a medically appropriate examination and/ evaluation.  Counseling and educating the patient/family/caregiver.  Ordering medications, tests, or procedures.  Referring and communicating with other health care professionals.  Documenting clinical information in EPIC.  Independently interpreting results and communicating results to patient/family/caregiver.  Care coordination.

## 2024-04-16 ENCOUNTER — TELEPHONE (OUTPATIENT)
Dept: INFECTIOUS DISEASES | Facility: MEDICAL CENTER | Age: 49
End: 2024-04-16
Payer: COMMERCIAL

## 2024-04-24 NOTE — DOCUMENTATION QUERY
"                                                                         [facility]                                                                       Query Response Note      PATIENT:               DADA BACA  ACCT #:                  [id number]  MRN:                     [id number]  :                      1975  ADMIT DATE:       2024 12:07 PM  DISCH DATE:        2024 3:23 PM  RESPONDING  PROVIDER #:        [id number]           QUERY TEXT:    The procedure note documents a skin graft substitute application to chronic nonhealing leg wound. Can the size of the skin graft substitute please be further specified for accurate CPT coding?    NOTE:  If an appropriate response is not listed below, please respond with a new note.    The patient's Clinical Indicators include:  Clinical indicators:    Chronic  recurrent left leg open wound, exposed tendon and musculature, MRSA infection    Treatment or Monitoring:    Debridement, application of skin graft substitute, Wound VAC    OP Report-  \"I then placed my medics and EpiFix skin graft substitute over  the tendon underneath the subcutaneous tissue to help facilitate a better chance of definitive wound healing and then reapproximated the skin edges  secondarily with interrupted 2-0 nylon without any tension on the skin.\"    Risk Factors:    Pain, prolonged infection, complication of musculoskeletal system of left leg    [place] contact information  [email or web address]  Options provided:   -- 25 sq cm or less]  [[Greater than 25 sq cm but less than 100 sq cm   -- Greater than or equal to 100 sq cm--Please specify size over 100 sq cm   -- Unable to determine      Query created by: Pam Bates on 2024 7:08 AM    RESPONSE TEXT:    25 sq cm or less] [[Greater than 25 sq cm but less than 100 sq cm          Electronically signed by:  MELISA PAREKH MD 2024 8:55 AM              "

## 2024-11-17 ENCOUNTER — APPOINTMENT (OUTPATIENT)
Dept: RADIOLOGY | Facility: MEDICAL CENTER | Age: 49
End: 2024-11-17
Attending: EMERGENCY MEDICINE
Payer: COMMERCIAL

## 2024-11-17 ENCOUNTER — HOSPITAL ENCOUNTER (EMERGENCY)
Facility: MEDICAL CENTER | Age: 49
End: 2024-11-17
Attending: EMERGENCY MEDICINE
Payer: COMMERCIAL

## 2024-11-17 VITALS
HEIGHT: 70 IN | RESPIRATION RATE: 16 BRPM | TEMPERATURE: 97.5 F | DIASTOLIC BLOOD PRESSURE: 88 MMHG | HEART RATE: 89 BPM | BODY MASS INDEX: 32.26 KG/M2 | WEIGHT: 225.31 LBS | OXYGEN SATURATION: 93 % | SYSTOLIC BLOOD PRESSURE: 131 MMHG

## 2024-11-17 DIAGNOSIS — S39.012A STRAIN OF LUMBAR REGION, INITIAL ENCOUNTER: ICD-10-CM

## 2024-11-17 DIAGNOSIS — M54.16 LUMBAR RADICULOPATHY: ICD-10-CM

## 2024-11-17 DIAGNOSIS — M54.9 PAIN, UPPER BACK: ICD-10-CM
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
## 2024-11-17 PROCEDURE — 700102 HCHG RX REV CODE 250 W/ 637 OVERRIDE(OP): Performed by: EMERGENCY MEDICINE

## 2024-11-17 PROCEDURE — 99284 EMERGENCY DEPT VISIT MOD MDM: CPT

## 2024-11-17 PROCEDURE — A9270 NON-COVERED ITEM OR SERVICE: HCPCS | Performed by: EMERGENCY MEDICINE

## 2024-11-17 PROCEDURE — 72070 X-RAY EXAM THORAC SPINE 2VWS: CPT

## 2024-11-17 PROCEDURE — 72100 X-RAY EXAM L-S SPINE 2/3 VWS: CPT

## 2024-11-17 RX ORDER — METHOCARBAMOL 500 MG/1
1000 TABLET, FILM COATED ORAL ONCE
Status: COMPLETED | OUTPATIENT
Start: 2024-11-17 | End: 2024-11-17

## 2024-11-17 RX ORDER — METHOCARBAMOL 750 MG/1
1500 TABLET, FILM COATED ORAL 4 TIMES DAILY PRN
Qty: 30 TABLET | Refills: 0 | Status: SHIPPED | OUTPATIENT
Start: 2024-11-17

## 2024-11-17 RX ADMIN — METHOCARBAMOL 1000 MG: 500 TABLET ORAL at 21:54

## 2024-11-17 ASSESSMENT — PAIN DESCRIPTION - PAIN TYPE: TYPE: ACUTE PAIN

## 2024-11-17 ASSESSMENT — FIBROSIS 4 INDEX: FIB4 SCORE: 0.75
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
## 2024-11-18 NOTE — ED NOTES
Patient discharged in ambulatory state after verbally confirming understanding of discharge paperwork and education provided by ER physician. Patient advised to return to the ER for any worsening pain of any other symptoms.

## 2024-11-18 NOTE — DISCHARGE INSTRUCTIONS
Take ibuprofen 800 mg 3-4 times a day for a week unless it upsets the stomach.  Add methocarbamol for spasm.  Take Tylenol for breakthrough pain.  You may have a superior endplate fracture of T12.  The xray cannot determine whether this is an old prior injury or a new one.  The treatment for this is if it is new is simply pain medicine.

## 2024-12-14 ENCOUNTER — OFFICE VISIT (OUTPATIENT)
Dept: URGENT CARE | Facility: PHYSICIAN GROUP | Age: 49
End: 2024-12-14
Payer: COMMERCIAL

## 2024-12-14 ENCOUNTER — APPOINTMENT (OUTPATIENT)
Dept: RADIOLOGY | Facility: IMAGING CENTER | Age: 49
End: 2024-12-14
Attending: PHYSICIAN ASSISTANT
Payer: COMMERCIAL

## 2024-12-14 VITALS
DIASTOLIC BLOOD PRESSURE: 100 MMHG | OXYGEN SATURATION: 100 % | HEIGHT: 70 IN | WEIGHT: 232.9 LBS | BODY MASS INDEX: 33.34 KG/M2 | SYSTOLIC BLOOD PRESSURE: 156 MMHG | TEMPERATURE: 98 F | HEART RATE: 102 BPM | RESPIRATION RATE: 20 BRPM

## 2024-12-14 DIAGNOSIS — S67.191A CRUSHING INJURY OF LEFT INDEX FINGER, INITIAL ENCOUNTER: Primary | ICD-10-CM

## 2024-12-14 DIAGNOSIS — S67.191A CRUSHING INJURY OF LEFT INDEX FINGER, INITIAL ENCOUNTER: ICD-10-CM

## 2024-12-14 PROCEDURE — 73140 X-RAY EXAM OF FINGER(S): CPT | Mod: TC,LT | Performed by: RADIOLOGY

## 2024-12-14 PROCEDURE — 1125F AMNT PAIN NOTED PAIN PRSNT: CPT | Performed by: PHYSICIAN ASSISTANT

## 2024-12-14 PROCEDURE — 3080F DIAST BP >= 90 MM HG: CPT | Performed by: PHYSICIAN ASSISTANT

## 2024-12-14 PROCEDURE — 99214 OFFICE O/P EST MOD 30 MIN: CPT | Performed by: PHYSICIAN ASSISTANT

## 2024-12-14 PROCEDURE — 3077F SYST BP >= 140 MM HG: CPT | Performed by: PHYSICIAN ASSISTANT

## 2024-12-14 RX ORDER — CEPHALEXIN 500 MG/1
500 CAPSULE ORAL 3 TIMES DAILY
Qty: 15 CAPSULE | Refills: 0 | Status: SHIPPED | OUTPATIENT
Start: 2024-12-14 | End: 2024-12-19

## 2024-12-14 ASSESSMENT — PAIN SCALES - GENERAL: PAINLEVEL_OUTOF10: 6=MODERATE PAIN

## 2024-12-14 ASSESSMENT — FIBROSIS 4 INDEX: FIB4 SCORE: 0.75

## 2024-12-14 NOTE — PROGRESS NOTES
"Subjective:   Jarod Fields is a 49 y.o. male who presents for Injury (Patient was closing a window today and accidentally slammed window on right middle finger and left index finger )      HPI  The patient presents to the Urgent Care with complaints of left index finger pain and right middle finger pain onset today.  He was closing a window today when it accidentally slammed down primarily to his left index finger.  Left index finger pain is severe with a small laceration and subungual hematoma.  Throbbing pain with swelling and bruising.  Mild pain to his right middle finger with some bruising.  No other injury.      Past Medical History:   Diagnosis Date    Chronic back pain     L2, L3, L4, L5. 12/6/23-recently started with pain management    Chronic infection 07/2023    to left lower leg-different bacteria    CRPS (complex regional pain syndrome type I) 2013    Elevated BP without diagnosis of hypertension     12/6/23-related to pain in LLE.    Kidney stones 1995    and approx 2015    Nerve pain     Left foot/ LLE    Pain 02/18/2015    right wrist, off medications     No Known Allergies     Objective:     BP (!) 156/100 (BP Location: Right arm, Patient Position: Sitting, BP Cuff Size: Adult)   Pulse (!) 102   Temp 36.7 °C (98 °F) (Temporal)   Resp 20   Ht 1.778 m (5' 10\")   Wt 106 kg (232 lb 14.4 oz)   SpO2 100%   BMI 33.42 kg/m²     Physical Exam  Vitals reviewed.   Constitutional:       General: He is not in acute distress.     Appearance: Normal appearance. He is not ill-appearing or toxic-appearing.   Eyes:      Conjunctiva/sclera: Conjunctivae normal.   Cardiovascular:      Rate and Rhythm: Normal rate.   Pulmonary:      Effort: Pulmonary effort is normal.   Musculoskeletal:        Hands:       Cervical back: Neck supple. No rigidity.      Comments: Left index finger: TTP with swelling and bruising to distal end of finger. Small subungual hematoma. Small abrasion to nail fold. No active " bleeding. No nail damage. Limited flexion at DIP joint secondary to pain and swelling. Extension and distal tendon intact. Sensation intact. Cap refill < 2 seconds.     Right middle finger: mild TTP to distal finger with small subungual hematoma. Minimal swelling. No abrasion or laceration. No active bleeding. Sensation intact. Cap refill < 2 seconds.        Skin:     General: Skin is warm and dry.   Neurological:      General: No focal deficit present.      Mental Status: He is alert and oriented to person, place, and time.   Psychiatric:         Mood and Affect: Mood normal.         Behavior: Behavior normal.         RADIOLOGY RESULTS   DX-FINGER(S) 2+ LEFT    Result Date: 12/14/2024 12/14/2024 3:30 PM HISTORY/REASON FOR EXAM: Left index finger pain. TECHNIQUE/EXAM DESCRIPTION AND NUMBER OF VIEWS: 3 views of the LEFT fingers. COMPARISON: None FINDINGS: Bone mineralization is normal. There is no evidence of fracture or dislocation. There is no evidence of arthropathy. Soft tissues are normal.     No evidence of fracture or dislocation.           Diagnosis and associated orders:     1. Crushing injury of left index finger, initial encounter  - DX-FINGER(S) 2+ LEFT; Future  - cephALEXin (KEFLEX) 500 MG Cap; Take 1 Capsule by mouth 3 times a day for 5 days.  Dispense: 15 Capsule; Refill: 0       Comments/MDM:     X-ray results per radiologist interpretation above. I personally reviewed images and radiologist report  The finger with thoroughly irrigated with normal saline.  Dressed with nonstick dressing and Coban.  Patient states his tetanus is up-to-date in the last 2 years.  Patient had a complication of his left lower leg with an infection in April so he had some concern for possible developing infection.  Agreed to place on prophylactic antibiotics.  Ice application, elevation, ibuprofen.       I personally reviewed prior external notes and test results pertinent to today's visit. Pathogenesis of diagnosis  discussed including typical length and natural progression. Supportive care, natural history, differential diagnoses, and indications for immediate follow-up discussed. Patient expresses understanding and agrees to plan. Patient denies any other questions or concerns.     Follow-up with the primary care physician for recheck, reevaluation, and consideration of further management.    Please note that this dictation was created using voice recognition software. I have made a reasonable attempt to correct obvious errors, but I expect that there are errors of grammar and possibly content that I did not discover before finalizing the note.    This note was electronically signed by Devon Power PA-C

## (undated) DEVICE — LACTATED RINGERS INJ 1000 ML - (14EA/CA 60CA/PF)

## (undated) DEVICE — SUTURE 2-0 ETHILON FS - (36/BX) 18 INCH

## (undated) DEVICE — PACK LOWER EXTREMITY - (2/CA)

## (undated) DEVICE — CHLORAPREP 26 ML APPLICATOR - ORANGE TINT(25/CA)

## (undated) DEVICE — COVER LIGHT HANDLE ALC PLUS DISP (18EA/BX)

## (undated) DEVICE — SODIUM CHL IRRIGATION 0.9% 1000ML (12EA/CA)

## (undated) DEVICE — SUTURE GENERAL

## (undated) DEVICE — SUTURE 2-0 VICRYL PLUS CT-1 - 8 X 18 INCH(12/BX)

## (undated) DEVICE — GLOVE BIOGEL PI ORTHO SZ 7.5 PF LF (40PR/BX)

## (undated) DEVICE — SLEEVE VASO CALF MED - (10PR/CA)

## (undated) DEVICE — SENSOR OXIMETER ADULT SPO2 RD SET (20EA/BX)

## (undated) DEVICE — TRAY SRGPRP PVP IOD WT PRP - (20/CA)

## (undated) DEVICE — STAPLER SKIN DISP - (6/BX 10BX/CA) VISISTAT

## (undated) DEVICE — DRAPE LARGE 3 QUARTER - (20/CA)

## (undated) DEVICE — PACK UPPER EXTREMITY (2EA/CA)

## (undated) DEVICE — ELECTRODE DUAL RETURN W/ CORD - (50/PK)

## (undated) DEVICE — GOWN WARMING STANDARD FLEX - (30/CA)

## (undated) DEVICE — SUCTION INSTRUMENT YANKAUER OPEN TIP W/O VENT (50EA/CA)

## (undated) DEVICE — SET LEADWIRE 5 LEAD BEDSIDE DISPOSABLE ECG (1SET OF 5/EA)

## (undated) DEVICE — COVER LIGHT HANDLE FLEXIBLE - SOFT (2EA/PK 80PK/CA)

## (undated) DEVICE — BLADE SURGICAL #15 - (50/BX 3BX/CA)

## (undated) DEVICE — WATER IRRIGATION STERILE 1000ML (12EA/CA)

## (undated) DEVICE — SUTURE ETHILON 2-0 FSLX 30 (36PK/BX)"

## (undated) DEVICE — SET EXTENSION WITH 2 PORTS (48EA/CA) ***PART #2C8610 IS A SUBSTITUTE*****

## (undated) DEVICE — BAG SPONGE COUNT 10.25 X 32 - BLUE (250/CA)

## (undated) DEVICE — SUTURE 3-0 ETHILON FS-1 - (36/BX) 30 INCH

## (undated) DEVICE — PAD LAP STERILE 18 X 18 - (5/PK 40PK/CA)

## (undated) DEVICE — CANISTER SUCTION 3000ML MECHANICAL FILTER AUTO SHUTOFF MEDI-VAC NONSTERILE LF DISP  (40EA/CA)

## (undated) DEVICE — WRAP COBAN SELF-ADHERENT 6 IN X  5YDS STERILE TAN (12/CA)

## (undated) DEVICE — GLOVE BIOGEL INDICATOR SZ 8 SURGICAL PF LTX - (50/BX 4BX/CA)

## (undated) DEVICE — TOWEL STOP TIMEOUT SAFETY FLAG (40EA/CA)

## (undated) DEVICE — SUTURE 0 VICRYL PLUS CT-1 - 8 X 18 INCH (12/BX)

## (undated) DEVICE — CLEANER ELECTRO-SURGICAL TIP - (25/BX 4BX/CA)

## (undated) DEVICE — BANDAGE ELASTIC 6 IN X 5 YDS - LATEX FREE (10/BX)

## (undated) DEVICE — PAD PREP 24 X 48 CUFFED - (100/CA)

## (undated) DEVICE — TUBE CONNECTING SUCTION - CLEAR PLASTIC STERILE 72 IN (50EA/CA)

## (undated) DEVICE — PADDING CAST 6 IN STERILE - 6 X 4 YDS (24/CA)

## (undated) DEVICE — PENCIL ELECTSURG 10FT BTN SWH - (50/CA)

## (undated) DEVICE — GLOVE BIOGEL SZ 7.5 SURGICAL PF LTX - (50PR/BX 4BX/CA)

## (undated) DEVICE — DRESSING KIT V.A.C. SENSA T.R.A.C. SMALL (5EA/CA)

## (undated) DEVICE — SLEEVE, VASO, THIGH, MED

## (undated) DEVICE — BANDAGE ELASTIC STERILE VELCRO 6 X 5 YDS (25EA/CA)

## (undated) DEVICE — SWAB CULTURE AMIES ESWAB (50EA/PK)

## (undated) DEVICE — SUCTION INSTRUMENT YANKAUER BULBOUS TIP W/O VENT (50EA/CA)

## (undated) DEVICE — BANDAGE ELASTIC LATEX STERILE VELCRO 4 X 5 YDS (25EA/CA)

## (undated) DEVICE — SYRINGE 10 ML CONTROL LL (25EA/BX 4BX/CA)

## (undated) DEVICE — GLOVE BIOGEL SZ 8 SURGICAL PF LTX - (50PR/BX 4BX/CA)

## (undated) DEVICE — CANISTER SUCTION RIGID RED 1500CC (40EA/CA)

## (undated) DEVICE — TRAY SKIN SCRUB PVP WET (20EA/CA) PART #DYND70356 DISCONTINUED

## (undated) DEVICE — CUP DENTURE W/ LID - (200/CA)

## (undated) DEVICE — GLOVE BIOGEL INDICATOR SZ 7.5 SURGICAL PF LTX - (50PR/BX 4BX/CA)

## (undated) DEVICE — TUBING CLEARLINK DUO-VENT - C-FLO (48EA/CA)

## (undated) DEVICE — DRAPE 36X28IN RAD CARM BND BG - (25/CA) O

## (undated) DEVICE — WRAP SELF ADHERING 3 IN X 5YDS NON STERILE TAN (1/EA)

## (undated) DEVICE — SUTURE 3-0 MONOCRYL PLUS PS-1 - 27 INCH (36/BX)

## (undated) DEVICE — BOVIE BLADE COATED - (50/PK)

## (undated) DEVICE — HUMID-VENT HEAT AND MOISTURE EXCHANGE- (50/BX)

## (undated) DEVICE — TIP INTPLS HFLO ML ORFC BTRY - (12/CS)  FOR SURGILAV

## (undated) DEVICE — SODIUM CHL. IRRIGATION 0.9% 3000ML (4EA/CA 65CA/PF)

## (undated) DEVICE — STOCKINET BIAS 6 IN STERILE - (20/CA)

## (undated) DEVICE — DRAPE SURG STERI-DRAPE 7X11OD - (40EA/CA)

## (undated) DEVICE — HANDPIECE 10FT INTPLS SCT PLS IRRIGATION HAND CONTROL SET (6/PK)